# Patient Record
Sex: MALE | Race: WHITE | Employment: OTHER | URBAN - NONMETROPOLITAN AREA
[De-identification: names, ages, dates, MRNs, and addresses within clinical notes are randomized per-mention and may not be internally consistent; named-entity substitution may affect disease eponyms.]

---

## 2016-09-07 LAB
CHOLESTEROL, TOTAL: 144 MG/DL
CHOLESTEROL/HDL RATIO: 4
CREATININE, URINE: 109.6
HDLC SERPL-MCNC: 36 MG/DL (ref 35–70)
LDL CHOLESTEROL CALCULATED: 70.2 MG/DL (ref 0–160)
MICROALBUMIN/CREAT 24H UR: 1.3 MG/G{CREAT}
MICROALBUMIN/CREAT UR-RTO: 11.9
TRIGL SERPL-MCNC: 189 MG/DL
VLDLC SERPL CALC-MCNC: 38 MG/DL

## 2017-03-06 LAB
BUN BLDV-MCNC: 15 MG/DL
CALCIUM SERPL-MCNC: 9.5 MG/DL
CHLORIDE BLD-SCNC: 106 MMOL/L
CO2: 28 MMOL/L
CREAT SERPL-MCNC: 1.2 MG/DL
GFR CALCULATED: NORMAL
GLUCOSE BLD-MCNC: 231 MG/DL
POTASSIUM SERPL-SCNC: 4.5 MMOL/L
SODIUM BLD-SCNC: 143 MMOL/L

## 2017-03-09 LAB — HBA1C MFR BLD: 9.5 %

## 2017-06-05 VITALS
WEIGHT: 236 LBS | BODY MASS INDEX: 37.04 KG/M2 | HEIGHT: 67 IN | SYSTOLIC BLOOD PRESSURE: 142 MMHG | DIASTOLIC BLOOD PRESSURE: 86 MMHG

## 2017-06-05 DIAGNOSIS — F52.21 MALE ERECTILE DISORDER (CODE): ICD-10-CM

## 2017-06-05 DIAGNOSIS — D03.8: ICD-10-CM

## 2017-06-05 DIAGNOSIS — E78.5 HYPERLIPIDEMIA, UNSPECIFIED HYPERLIPIDEMIA TYPE: ICD-10-CM

## 2017-06-05 DIAGNOSIS — K21.9 GASTROESOPHAGEAL REFLUX DISEASE WITHOUT ESOPHAGITIS: ICD-10-CM

## 2017-06-05 DIAGNOSIS — G93.89 OTHER SPECIFIED DISORDERS OF BRAIN: ICD-10-CM

## 2017-06-05 DIAGNOSIS — E11.8 TYPE 2 DIABETES MELLITUS WITH COMPLICATION, WITHOUT LONG-TERM CURRENT USE OF INSULIN (HCC): ICD-10-CM

## 2017-06-05 DIAGNOSIS — R09.89 OTHER SPECIFIED SYMPTOMS AND SIGNS INVOLVING THE CIRCULATORY AND RESPIRATORY SYSTEMS: ICD-10-CM

## 2017-06-05 DIAGNOSIS — R06.2 WHEEZING: ICD-10-CM

## 2017-06-05 DIAGNOSIS — R97.20 ELEVATED PSA: ICD-10-CM

## 2017-06-05 PROBLEM — E11.9 DIABETES MELLITUS (HCC): Status: ACTIVE | Noted: 2017-06-05

## 2017-06-05 RX ORDER — METOPROLOL SUCCINATE 25 MG/1
25 TABLET, EXTENDED RELEASE ORAL DAILY
Status: ON HOLD | COMMUNITY
End: 2018-10-26

## 2017-06-05 RX ORDER — FINASTERIDE 5 MG/1
5 TABLET, FILM COATED ORAL DAILY
COMMUNITY
End: 2018-12-20

## 2017-06-05 RX ORDER — METFORMIN HYDROCHLORIDE 500 MG/1
500 TABLET, FILM COATED, EXTENDED RELEASE ORAL
COMMUNITY
End: 2017-06-06 | Stop reason: SDUPTHER

## 2017-06-05 RX ORDER — ESOMEPRAZOLE MAGNESIUM 40 MG/1
40 FOR SUSPENSION ORAL DAILY
COMMUNITY
End: 2017-06-05 | Stop reason: SDUPTHER

## 2017-06-05 RX ORDER — TAMSULOSIN HYDROCHLORIDE 0.4 MG/1
0.4 CAPSULE ORAL DAILY
COMMUNITY
End: 2018-12-20

## 2017-06-05 RX ORDER — GLIMEPIRIDE 4 MG/1
4 TABLET ORAL
COMMUNITY
End: 2017-06-29 | Stop reason: SDUPTHER

## 2017-06-05 RX ORDER — SIMVASTATIN 40 MG
40 TABLET ORAL NIGHTLY
COMMUNITY
End: 2017-06-22 | Stop reason: SDUPTHER

## 2017-06-05 RX ORDER — LISINOPRIL 2.5 MG/1
2.5 TABLET ORAL DAILY
COMMUNITY
End: 2017-06-08 | Stop reason: DRUGHIGH

## 2017-06-05 RX ORDER — NAPROXEN 500 MG/1
500 TABLET ORAL 2 TIMES DAILY WITH MEALS
COMMUNITY
End: 2018-11-27 | Stop reason: SDUPTHER

## 2017-06-05 RX ORDER — ALBUTEROL SULFATE 90 UG/1
2 AEROSOL, METERED RESPIRATORY (INHALATION) EVERY 6 HOURS PRN
COMMUNITY
End: 2017-06-08 | Stop reason: SDUPTHER

## 2017-06-06 RX ORDER — ESOMEPRAZOLE MAGNESIUM 40 MG/1
40 FOR SUSPENSION ORAL DAILY
Qty: 90 PACKET | Refills: 3 | Status: SHIPPED | OUTPATIENT
Start: 2017-06-06 | End: 2017-06-08 | Stop reason: CLARIF

## 2017-06-06 RX ORDER — METFORMIN HYDROCHLORIDE 500 MG/1
1000 TABLET, FILM COATED, EXTENDED RELEASE ORAL 2 TIMES DAILY
Qty: 360 TABLET | Refills: 3 | Status: SHIPPED | OUTPATIENT
Start: 2017-06-06 | End: 2018-05-14 | Stop reason: SDUPTHER

## 2017-06-08 ENCOUNTER — OFFICE VISIT (OUTPATIENT)
Dept: FAMILY MEDICINE CLINIC | Age: 72
End: 2017-06-08
Payer: MEDICARE

## 2017-06-08 VITALS
BODY MASS INDEX: 37.04 KG/M2 | SYSTOLIC BLOOD PRESSURE: 160 MMHG | HEIGHT: 67 IN | WEIGHT: 236 LBS | DIASTOLIC BLOOD PRESSURE: 80 MMHG | HEART RATE: 60 BPM

## 2017-06-08 DIAGNOSIS — I10 ESSENTIAL HYPERTENSION: ICD-10-CM

## 2017-06-08 DIAGNOSIS — E78.2 MIXED HYPERLIPIDEMIA: ICD-10-CM

## 2017-06-08 DIAGNOSIS — F52.21 MALE ERECTILE DISORDER (CODE): ICD-10-CM

## 2017-06-08 DIAGNOSIS — R97.20 ELEVATED PSA: ICD-10-CM

## 2017-06-08 DIAGNOSIS — R06.2 WHEEZING: ICD-10-CM

## 2017-06-08 DIAGNOSIS — K21.9 GASTROESOPHAGEAL REFLUX DISEASE WITHOUT ESOPHAGITIS: ICD-10-CM

## 2017-06-08 DIAGNOSIS — L60.0 INGROWN RIGHT BIG TOENAIL: ICD-10-CM

## 2017-06-08 DIAGNOSIS — D03.8: ICD-10-CM

## 2017-06-08 DIAGNOSIS — E11.9 TYPE 2 DIABETES MELLITUS WITHOUT COMPLICATION, UNSPECIFIED LONG TERM INSULIN USE STATUS: Primary | ICD-10-CM

## 2017-06-08 LAB — HBA1C MFR BLD: 7.8 %

## 2017-06-08 PROCEDURE — 4040F PNEUMOC VAC/ADMIN/RCVD: CPT | Performed by: FAMILY MEDICINE

## 2017-06-08 PROCEDURE — 1036F TOBACCO NON-USER: CPT | Performed by: FAMILY MEDICINE

## 2017-06-08 PROCEDURE — G8417 CALC BMI ABV UP PARAM F/U: HCPCS | Performed by: FAMILY MEDICINE

## 2017-06-08 PROCEDURE — 99214 OFFICE O/P EST MOD 30 MIN: CPT | Performed by: FAMILY MEDICINE

## 2017-06-08 PROCEDURE — 3045F PR MOST RECENT HEMOGLOBIN A1C LEVEL 7.0-9.0%: CPT | Performed by: FAMILY MEDICINE

## 2017-06-08 PROCEDURE — 3017F COLORECTAL CA SCREEN DOC REV: CPT | Performed by: FAMILY MEDICINE

## 2017-06-08 PROCEDURE — 1123F ACP DISCUSS/DSCN MKR DOCD: CPT | Performed by: FAMILY MEDICINE

## 2017-06-08 PROCEDURE — G8427 DOCREV CUR MEDS BY ELIG CLIN: HCPCS | Performed by: FAMILY MEDICINE

## 2017-06-08 PROCEDURE — 83036 HEMOGLOBIN GLYCOSYLATED A1C: CPT | Performed by: FAMILY MEDICINE

## 2017-06-08 RX ORDER — ALBUTEROL SULFATE 90 UG/1
2 AEROSOL, METERED RESPIRATORY (INHALATION) EVERY 6 HOURS PRN
Qty: 3 INHALER | Refills: 3 | Status: SHIPPED | OUTPATIENT
Start: 2017-06-08 | End: 2018-06-27 | Stop reason: SDUPTHER

## 2017-06-08 RX ORDER — LISINOPRIL 20 MG/1
20 TABLET ORAL DAILY
Qty: 90 TABLET | Refills: 3 | Status: SHIPPED | OUTPATIENT
Start: 2017-06-08 | End: 2017-08-22 | Stop reason: SDUPTHER

## 2017-06-08 RX ORDER — ESOMEPRAZOLE MAGNESIUM 40 MG/1
40 CAPSULE, DELAYED RELEASE ORAL
COMMUNITY
End: 2017-10-13 | Stop reason: SDUPTHER

## 2017-06-08 ASSESSMENT — PATIENT HEALTH QUESTIONNAIRE - PHQ9
SUM OF ALL RESPONSES TO PHQ9 QUESTIONS 1 & 2: 0
SUM OF ALL RESPONSES TO PHQ QUESTIONS 1-9: 0
2. FEELING DOWN, DEPRESSED OR HOPELESS: 0
1. LITTLE INTEREST OR PLEASURE IN DOING THINGS: 0

## 2017-06-08 ASSESSMENT — ENCOUNTER SYMPTOMS
ABDOMINAL PAIN: 0
ABDOMINAL DISTENTION: 0
SORE THROAT: 0
SHORTNESS OF BREATH: 0
WHEEZING: 0
CHEST TIGHTNESS: 0

## 2017-06-12 ENCOUNTER — PROCEDURE VISIT (OUTPATIENT)
Dept: FAMILY MEDICINE CLINIC | Age: 72
End: 2017-06-12
Payer: MEDICARE

## 2017-06-12 VITALS
HEIGHT: 67 IN | WEIGHT: 234 LBS | BODY MASS INDEX: 36.73 KG/M2 | DIASTOLIC BLOOD PRESSURE: 70 MMHG | SYSTOLIC BLOOD PRESSURE: 130 MMHG | HEART RATE: 68 BPM

## 2017-06-12 DIAGNOSIS — L60.0 INGROWN RIGHT BIG TOENAIL: Primary | ICD-10-CM

## 2017-06-12 PROCEDURE — 99999 PR OFFICE/OUTPT VISIT,PROCEDURE ONLY: CPT | Performed by: FAMILY MEDICINE

## 2017-06-12 PROCEDURE — 11750 EXCISION NAIL&NAIL MATRIX: CPT | Performed by: FAMILY MEDICINE

## 2017-06-12 PROCEDURE — 1036F TOBACCO NON-USER: CPT | Performed by: FAMILY MEDICINE

## 2017-06-22 RX ORDER — SIMVASTATIN 40 MG
TABLET ORAL
Qty: 90 TABLET | Refills: 0 | Status: SHIPPED | OUTPATIENT
Start: 2017-06-22 | End: 2017-09-21 | Stop reason: SDUPTHER

## 2017-06-29 RX ORDER — GLIMEPIRIDE 4 MG/1
TABLET ORAL
Qty: 90 TABLET | Refills: 3 | Status: SHIPPED | OUTPATIENT
Start: 2017-06-29 | End: 2018-06-25 | Stop reason: SDUPTHER

## 2017-08-22 DIAGNOSIS — I10 ESSENTIAL HYPERTENSION: ICD-10-CM

## 2017-08-22 RX ORDER — LISINOPRIL 20 MG/1
20 TABLET ORAL DAILY
Qty: 90 TABLET | Refills: 3 | Status: SHIPPED | OUTPATIENT
Start: 2017-08-22 | End: 2018-03-19 | Stop reason: SDUPTHER

## 2017-09-08 ENCOUNTER — OFFICE VISIT (OUTPATIENT)
Dept: FAMILY MEDICINE CLINIC | Age: 72
End: 2017-09-08
Payer: MEDICARE

## 2017-09-08 VITALS
RESPIRATION RATE: 18 BRPM | SYSTOLIC BLOOD PRESSURE: 132 MMHG | WEIGHT: 236 LBS | OXYGEN SATURATION: 97 % | DIASTOLIC BLOOD PRESSURE: 64 MMHG | HEART RATE: 68 BPM | BODY MASS INDEX: 36.96 KG/M2

## 2017-09-08 DIAGNOSIS — F52.21 MALE ERECTILE DISORDER (CODE): ICD-10-CM

## 2017-09-08 DIAGNOSIS — R06.2 WHEEZING: ICD-10-CM

## 2017-09-08 DIAGNOSIS — E78.2 MIXED HYPERLIPIDEMIA: ICD-10-CM

## 2017-09-08 DIAGNOSIS — Z23 NEED FOR PROPHYLACTIC VACCINATION AGAINST STREPTOCOCCUS PNEUMONIAE (PNEUMOCOCCUS): ICD-10-CM

## 2017-09-08 DIAGNOSIS — E11.8 TYPE 2 DIABETES MELLITUS WITH COMPLICATION, UNSPECIFIED LONG TERM INSULIN USE STATUS: Primary | ICD-10-CM

## 2017-09-08 DIAGNOSIS — D03.8: ICD-10-CM

## 2017-09-08 DIAGNOSIS — K21.9 GASTROESOPHAGEAL REFLUX DISEASE WITHOUT ESOPHAGITIS: ICD-10-CM

## 2017-09-08 DIAGNOSIS — L98.9 SKIN LESION: ICD-10-CM

## 2017-09-08 DIAGNOSIS — R97.20 ELEVATED PSA: ICD-10-CM

## 2017-09-08 DIAGNOSIS — Z23 NEED FOR DIPHTHERIA-TETANUS-PERTUSSIS (TDAP) VACCINE, ADULT/ADOLESCENT: ICD-10-CM

## 2017-09-08 LAB — HBA1C MFR BLD: 7.4 %

## 2017-09-08 PROCEDURE — G8417 CALC BMI ABV UP PARAM F/U: HCPCS | Performed by: FAMILY MEDICINE

## 2017-09-08 PROCEDURE — 99214 OFFICE O/P EST MOD 30 MIN: CPT | Performed by: FAMILY MEDICINE

## 2017-09-08 PROCEDURE — G8427 DOCREV CUR MEDS BY ELIG CLIN: HCPCS | Performed by: FAMILY MEDICINE

## 2017-09-08 PROCEDURE — 4040F PNEUMOC VAC/ADMIN/RCVD: CPT | Performed by: FAMILY MEDICINE

## 2017-09-08 PROCEDURE — 1036F TOBACCO NON-USER: CPT | Performed by: FAMILY MEDICINE

## 2017-09-08 PROCEDURE — 1123F ACP DISCUSS/DSCN MKR DOCD: CPT | Performed by: FAMILY MEDICINE

## 2017-09-08 PROCEDURE — 83036 HEMOGLOBIN GLYCOSYLATED A1C: CPT | Performed by: FAMILY MEDICINE

## 2017-09-08 PROCEDURE — 3046F HEMOGLOBIN A1C LEVEL >9.0%: CPT | Performed by: FAMILY MEDICINE

## 2017-09-08 PROCEDURE — 3017F COLORECTAL CA SCREEN DOC REV: CPT | Performed by: FAMILY MEDICINE

## 2017-09-08 ASSESSMENT — ENCOUNTER SYMPTOMS
SHORTNESS OF BREATH: 0
ABDOMINAL PAIN: 0
SORE THROAT: 0
ABDOMINAL DISTENTION: 0
CHEST TIGHTNESS: 0
WHEEZING: 0

## 2017-09-11 PROCEDURE — G0009 ADMIN PNEUMOCOCCAL VACCINE: HCPCS | Performed by: FAMILY MEDICINE

## 2017-09-11 PROCEDURE — 90472 IMMUNIZATION ADMIN EACH ADD: CPT | Performed by: FAMILY MEDICINE

## 2017-09-11 PROCEDURE — 90670 PCV13 VACCINE IM: CPT | Performed by: FAMILY MEDICINE

## 2017-09-12 DIAGNOSIS — E11.8 TYPE 2 DIABETES MELLITUS WITH COMPLICATION, UNSPECIFIED LONG TERM INSULIN USE STATUS: Primary | ICD-10-CM

## 2017-09-19 ENCOUNTER — PROCEDURE VISIT (OUTPATIENT)
Dept: FAMILY MEDICINE CLINIC | Age: 72
End: 2017-09-19
Payer: MEDICARE

## 2017-09-19 VITALS
WEIGHT: 239 LBS | HEART RATE: 60 BPM | SYSTOLIC BLOOD PRESSURE: 160 MMHG | DIASTOLIC BLOOD PRESSURE: 80 MMHG | BODY MASS INDEX: 37.43 KG/M2

## 2017-09-19 DIAGNOSIS — L98.9 SKIN LESION: Primary | ICD-10-CM

## 2017-09-19 PROCEDURE — 11100 PR BIOPSY OF SKIN LESION: CPT | Performed by: FAMILY MEDICINE

## 2017-09-21 RX ORDER — SIMVASTATIN 40 MG
TABLET ORAL
Qty: 90 TABLET | Refills: 0 | Status: SHIPPED | OUTPATIENT
Start: 2017-09-21 | End: 2017-12-20 | Stop reason: SDUPTHER

## 2017-09-29 LAB
CREATININE, RANDOM: 193.6 MG/DL
MICROALBUMIN UR-MCNC: 3.1 MG/DL
MICROALBUMIN/CREAT UR-RTO: 16 MCG/MG CR

## 2017-10-13 DIAGNOSIS — K21.9 GASTROESOPHAGEAL REFLUX DISEASE WITHOUT ESOPHAGITIS: Primary | ICD-10-CM

## 2017-10-13 RX ORDER — ESOMEPRAZOLE MAGNESIUM 40 MG/1
40 CAPSULE, DELAYED RELEASE ORAL
Qty: 90 CAPSULE | Refills: 3 | Status: SHIPPED | OUTPATIENT
Start: 2017-10-13 | End: 2018-10-08 | Stop reason: SDUPTHER

## 2017-12-12 ENCOUNTER — OFFICE VISIT (OUTPATIENT)
Dept: FAMILY MEDICINE CLINIC | Age: 72
End: 2017-12-12
Payer: MEDICARE

## 2017-12-12 VITALS
BODY MASS INDEX: 36.02 KG/M2 | HEART RATE: 68 BPM | SYSTOLIC BLOOD PRESSURE: 136 MMHG | DIASTOLIC BLOOD PRESSURE: 70 MMHG | WEIGHT: 230 LBS

## 2017-12-12 DIAGNOSIS — E11.8 TYPE 2 DIABETES MELLITUS WITH COMPLICATION, UNSPECIFIED LONG TERM INSULIN USE STATUS: Primary | ICD-10-CM

## 2017-12-12 DIAGNOSIS — R06.2 WHEEZING: ICD-10-CM

## 2017-12-12 DIAGNOSIS — F52.21 MALE ERECTILE DISORDER (CODE): ICD-10-CM

## 2017-12-12 DIAGNOSIS — K21.9 GASTROESOPHAGEAL REFLUX DISEASE WITHOUT ESOPHAGITIS: ICD-10-CM

## 2017-12-12 DIAGNOSIS — E78.2 MIXED HYPERLIPIDEMIA: ICD-10-CM

## 2017-12-12 LAB — HBA1C MFR BLD: 6.7 %

## 2017-12-12 PROCEDURE — G8484 FLU IMMUNIZE NO ADMIN: HCPCS | Performed by: FAMILY MEDICINE

## 2017-12-12 PROCEDURE — 1036F TOBACCO NON-USER: CPT | Performed by: FAMILY MEDICINE

## 2017-12-12 PROCEDURE — G8417 CALC BMI ABV UP PARAM F/U: HCPCS | Performed by: FAMILY MEDICINE

## 2017-12-12 PROCEDURE — G8427 DOCREV CUR MEDS BY ELIG CLIN: HCPCS | Performed by: FAMILY MEDICINE

## 2017-12-12 PROCEDURE — 1123F ACP DISCUSS/DSCN MKR DOCD: CPT | Performed by: FAMILY MEDICINE

## 2017-12-12 PROCEDURE — 3044F HG A1C LEVEL LT 7.0%: CPT | Performed by: FAMILY MEDICINE

## 2017-12-12 PROCEDURE — 4040F PNEUMOC VAC/ADMIN/RCVD: CPT | Performed by: FAMILY MEDICINE

## 2017-12-12 PROCEDURE — 3017F COLORECTAL CA SCREEN DOC REV: CPT | Performed by: FAMILY MEDICINE

## 2017-12-12 PROCEDURE — 99214 OFFICE O/P EST MOD 30 MIN: CPT | Performed by: FAMILY MEDICINE

## 2017-12-12 PROCEDURE — 83036 HEMOGLOBIN GLYCOSYLATED A1C: CPT | Performed by: FAMILY MEDICINE

## 2017-12-12 ASSESSMENT — ENCOUNTER SYMPTOMS
ABDOMINAL PAIN: 0
SHORTNESS OF BREATH: 0
SORE THROAT: 0
WHEEZING: 0
CHEST TIGHTNESS: 0
ABDOMINAL DISTENTION: 0

## 2017-12-12 NOTE — PROGRESS NOTES
[address] [address] [address]  Dept: [phone]  Dept Fax: [phone]    Ulices Dan is a 74 y.o. male who presents today for his medical conditions/complaints as noted below. Ulices Dan is c/o of 3 Month Follow-Up (pt states he is getting over a cough that he had for some time); Diabetes (bs ck every day to every other day, has not had a reading over 130. last eye exam a month ago. denies any polyuria or polydypsia, numbness or tingling);  Gastroesophageal Reflux (denies abd pain, nausea or vomiting, heartburn, rectal bleeding); and Hyperlipidemia (denies chest pains, sob, dizziness, leg edema, palpatations)      HPI:     HPI   Diabetes  Doing well      Checking blood sugars   []   none Daily; says they are really good   Meds   []   none    Last hemoglobin A1C   []   none 7.4   Last eye exam   []   none He sees opthamology every 3 months    microalbumin   []   none    Last diabetic foot exam   []   none 3/2017; normal    Endocrinologist    [x]   none    Hypoglycemic episodes    []   none Rarely ; he can tell ; symptomatic ; but not very often   Complications   []   none     CAD    []   none     Hypertension  Checking blood pressures routinely; most every day  well controlled; BP: 136/70   No problems with medication side effects; tolerating well  No chest pain  No edema     GERD  Patient is doing well  Compliant with medication  Denies heartburn  Denies dysphagia  Denies melena or hematochezia   Last EGD: none   Gastroenterologist : none   Hyperlipidemia   Cholesterol 144; HDL 36; LDL 70 in 2016  Medication side effects - none  Cardiovascular risk factors -   The 10-year CVD risk score (VIKTORIA'Agostino, et al., 2008) is: 41.1%    Values used to calculate the score:      Age: 74 years      Sex: Male      Diabetic: Yes      Tobacco smoker: No      Systolic Blood Pressure: 388 mmHg      Is BP treated: No      HDL Cholesterol: 36 mg/dL      Total thyromegaly present. Cardiovascular: Regular rhythm, S1 normal and S2 normal.    No murmur heard. Pulmonary/Chest: Breath sounds normal. He has no wheezes. He has no rhonchi. He has no rales. Abdominal: Soft. Bowel sounds are normal. There is no hepatosplenomegaly. There is no tenderness. Musculoskeletal: He exhibits no edema. Back:    Lymphadenopathy:     He has no cervical adenopathy. He has no axillary adenopathy. Skin: No rash noted. Vitals reviewed. Assessment:     1. Type 2 diabetes mellitus with complication, unspecified long term insulin use status (HCC)  POCT glycosylated hemoglobin (Hb A1C)    CBC Auto Differential    Comprehensive Metabolic Panel, Fasting   2. Gastroesophageal reflux disease without esophagitis     3. Mixed hyperlipidemia  Lipid Panel   4. Male erectile disorder (CODE)     5. Wheezing              POC Testing Results (If Applicable):  Results for POC orders placed in visit on 12/12/17   POCT glycosylated hemoglobin (Hb A1C)   Result Value Ref Range    Hemoglobin A1C 6.7 %       Plan:     Continue current meds   Follow up in 3 months     Orders Given:  Orders Placed This Encounter   Procedures    Lipid Panel     Standing Status:   Future     Standing Expiration Date:   12/12/2018     Order Specific Question:   Is Patient Fasting?/# of Hours     Answer:   Yes, 12 hours    CBC Auto Differential     Standing Status:   Future     Standing Expiration Date:   12/12/2018    Comprehensive Metabolic Panel, Fasting     Standing Status:   Future     Standing Expiration Date:   12/12/2018    POCT glycosylated hemoglobin (Hb A1C)     Prescriptions:    No orders of the defined types were placed in this encounter. Return in about 3 months (around 3/12/2018). Electronically signed by Gayle Vallejo MD on 12/13/2017.

## 2017-12-20 DIAGNOSIS — E78.5 HYPERLIPIDEMIA, UNSPECIFIED HYPERLIPIDEMIA TYPE: Primary | ICD-10-CM

## 2017-12-20 DIAGNOSIS — E11.8 TYPE 2 DIABETES MELLITUS WITH COMPLICATION, UNSPECIFIED LONG TERM INSULIN USE STATUS: ICD-10-CM

## 2017-12-21 RX ORDER — SIMVASTATIN 40 MG
TABLET ORAL
Qty: 90 TABLET | Refills: 0 | Status: SHIPPED | OUTPATIENT
Start: 2017-12-21 | End: 2018-03-20 | Stop reason: SDUPTHER

## 2018-03-10 LAB
A/G RATIO: 1.1 RATIO
AGE FOR GFR: 75
ALBUMIN: 3.9 G/DL
ALK PHOSPHATASE: 65 UNITS/L
ALT SERPL-CCNC: 49 UNITS/L
ANION GAP SERPL CALCULATED.3IONS-SCNC: 17 MMOL/L
AST SERPL-CCNC: 23 UNITS/L
BASOPHILS # BLD: 0.1 THOU/MM3
BILIRUB SERPL-MCNC: 0.6 MG/DL
BUN BLDV-MCNC: 19 MG/DL
CALCIUM SERPL-MCNC: 9.3 MG/DL
CHLORIDE BLD-SCNC: 108 MMOL/L
CHOLESTEROL/HDL RATIO: 4 RATIO
CHOLESTEROL: 149 MG/DL
CO2: 26 MMOL/L
CREAT SERPL-MCNC: 1.3 MG/DL
DIFFERENTIAL: AUTOMATED DIFF
EGFR BF: 48 ML/MIN/1.73 M2
EGFR BM: 65 ML/MIN/1.73 M2
EGFR WF: 40 ML/MIN/1.73 M2
EGFR WM: 54 ML/MIN/1.73 M2
EOSINOPHIL # BLD: 0.11 THOU/MM3
GLOBULIN: 3.4 G/DL
GLUCOSE: 192 MG/DL
HCT VFR BLD CALC: 47.4 %
HDL, DIRECT: 37 MG/DL
HEMOGLOBIN: 16.1 G/DL
LDL CHOLESTEROL CALCULATED: 89.2 MG/DL
LYMPHOCYTES # BLD: 2.2 THOU/MM3
MCH RBC QN AUTO: 29.3 PG
MCHC RBC AUTO-ENTMCNC: 33.9 G/DL
MCV RBC AUTO: 86.5 FL
MONOCYTES # BLD: 0.63 THOU/MM3
NEUTROPHILS: 2.28 THOU/MM3
PDW BLD-RTO: 12.3 %
PLATELET # BLD: 153 THOU/MM3
PMV BLD AUTO: 9.1 FL
POTASSIUM SERPL-SCNC: 4.8 MMOL/L
RBC # BLD: 5.48 M/UL
SODIUM BLD-SCNC: 146 MMOL/L
TOTAL PROTEIN: 7.3 G/DL
TRIGL SERPL-MCNC: 114 MG/DL
VLDLC SERPL CALC-MCNC: 23 MG/DL
WBC # BLD: 5.33 THOU/ML3

## 2018-03-12 DIAGNOSIS — E78.2 MIXED HYPERLIPIDEMIA: ICD-10-CM

## 2018-03-12 DIAGNOSIS — E11.8 TYPE 2 DIABETES MELLITUS WITH COMPLICATION, UNSPECIFIED LONG TERM INSULIN USE STATUS: ICD-10-CM

## 2018-03-13 ENCOUNTER — OFFICE VISIT (OUTPATIENT)
Dept: FAMILY MEDICINE CLINIC | Age: 73
End: 2018-03-13
Payer: MEDICARE

## 2018-03-13 VITALS
BODY MASS INDEX: 37.2 KG/M2 | DIASTOLIC BLOOD PRESSURE: 72 MMHG | SYSTOLIC BLOOD PRESSURE: 130 MMHG | WEIGHT: 237 LBS | HEIGHT: 67 IN | HEART RATE: 60 BPM

## 2018-03-13 DIAGNOSIS — E11.8 TYPE 2 DIABETES MELLITUS WITH COMPLICATION, UNSPECIFIED LONG TERM INSULIN USE STATUS: Primary | ICD-10-CM

## 2018-03-13 DIAGNOSIS — E78.2 MIXED HYPERLIPIDEMIA: ICD-10-CM

## 2018-03-13 DIAGNOSIS — E11.9 TYPE 2 DIABETES MELLITUS WITHOUT COMPLICATION, WITHOUT LONG-TERM CURRENT USE OF INSULIN (HCC): ICD-10-CM

## 2018-03-13 DIAGNOSIS — D03.8 MELANOMA IN SITU OF OTHER SITE (HCC): ICD-10-CM

## 2018-03-13 DIAGNOSIS — K21.9 GASTROESOPHAGEAL REFLUX DISEASE WITHOUT ESOPHAGITIS: ICD-10-CM

## 2018-03-13 LAB — HBA1C MFR BLD: 8 %

## 2018-03-13 PROCEDURE — 3045F PR MOST RECENT HEMOGLOBIN A1C LEVEL 7.0-9.0%: CPT | Performed by: FAMILY MEDICINE

## 2018-03-13 PROCEDURE — G8417 CALC BMI ABV UP PARAM F/U: HCPCS | Performed by: FAMILY MEDICINE

## 2018-03-13 PROCEDURE — 3017F COLORECTAL CA SCREEN DOC REV: CPT | Performed by: FAMILY MEDICINE

## 2018-03-13 PROCEDURE — G8427 DOCREV CUR MEDS BY ELIG CLIN: HCPCS | Performed by: FAMILY MEDICINE

## 2018-03-13 PROCEDURE — G8482 FLU IMMUNIZE ORDER/ADMIN: HCPCS | Performed by: FAMILY MEDICINE

## 2018-03-13 PROCEDURE — 1036F TOBACCO NON-USER: CPT | Performed by: FAMILY MEDICINE

## 2018-03-13 PROCEDURE — 99214 OFFICE O/P EST MOD 30 MIN: CPT | Performed by: FAMILY MEDICINE

## 2018-03-13 PROCEDURE — 83036 HEMOGLOBIN GLYCOSYLATED A1C: CPT | Performed by: FAMILY MEDICINE

## 2018-03-13 PROCEDURE — 4040F PNEUMOC VAC/ADMIN/RCVD: CPT | Performed by: FAMILY MEDICINE

## 2018-03-13 PROCEDURE — 1123F ACP DISCUSS/DSCN MKR DOCD: CPT | Performed by: FAMILY MEDICINE

## 2018-03-13 ASSESSMENT — ENCOUNTER SYMPTOMS
SHORTNESS OF BREATH: 0
SORE THROAT: 0
ABDOMINAL PAIN: 0
CHEST TIGHTNESS: 0
WHEEZING: 0
ABDOMINAL DISTENTION: 0

## 2018-03-15 PROBLEM — J45.30 MILD PERSISTENT ASTHMA WITHOUT COMPLICATION: Status: ACTIVE | Noted: 2017-06-05

## 2018-03-19 DIAGNOSIS — I10 ESSENTIAL HYPERTENSION: ICD-10-CM

## 2018-03-19 RX ORDER — LISINOPRIL 20 MG/1
20 TABLET ORAL DAILY
Qty: 90 TABLET | Refills: 3 | Status: SHIPPED | OUTPATIENT
Start: 2018-03-19 | End: 2019-03-19 | Stop reason: SDUPTHER

## 2018-03-20 DIAGNOSIS — E78.5 HYPERLIPIDEMIA, UNSPECIFIED HYPERLIPIDEMIA TYPE: ICD-10-CM

## 2018-03-20 RX ORDER — SIMVASTATIN 40 MG
TABLET ORAL
Qty: 90 TABLET | Refills: 3 | Status: SHIPPED | OUTPATIENT
Start: 2018-03-20 | End: 2019-03-19 | Stop reason: SDUPTHER

## 2018-03-20 NOTE — TELEPHONE ENCOUNTER
Anel Odom is calling to request a refill on the following medication(s):  Requested Prescriptions     Pending Prescriptions Disp Refills    simvastatin (ZOCOR) 40 MG tablet [Pharmacy Med Name: SIMVASTATIN TABS 40MG] 90 tablet 3     Sig: TAKE 1 TABLET DAILY AT BEDTIME       Last Visit Date (If Applicable):  7/80/0394    Next Visit Date:    6/14/2018

## 2018-03-22 DIAGNOSIS — E11.8 TYPE 2 DIABETES MELLITUS WITH COMPLICATION, UNSPECIFIED LONG TERM INSULIN USE STATUS: ICD-10-CM

## 2018-03-22 NOTE — TELEPHONE ENCOUNTER
From: Josephine Macias  Sent: 3/21/2018 6:48 PM EDT  Subject: Medication Renewal Request    Josephine Macias would like a refill of the following medications:      SITagliptin (JANUVIA) 100 MG tablet Tameka Chavez MD]    Preferred pharmacy: [address] [place] [address] , [address] [facility] [phone] - F [phone]

## 2018-03-28 DIAGNOSIS — E11.8 TYPE 2 DIABETES MELLITUS WITH COMPLICATION, UNSPECIFIED LONG TERM INSULIN USE STATUS: ICD-10-CM

## 2018-03-28 NOTE — TELEPHONE ENCOUNTER
From: Meli Kasper  Sent: 3/27/2018 7:04 PM EDT  Subject: Medication Renewal Request    Meli Kasper would like a refill of the following medications:      SITagliptin (JANUVIA) 100 MG tablet Soraya Willis MD]    Preferred pharmacy: [address] [place] [address] , [address] [facility] [phone] - F [phone]

## 2018-04-10 ENCOUNTER — OFFICE VISIT (OUTPATIENT)
Dept: FAMILY MEDICINE CLINIC | Age: 73
End: 2018-04-10
Payer: MEDICARE

## 2018-04-10 VITALS
SYSTOLIC BLOOD PRESSURE: 120 MMHG | WEIGHT: 222 LBS | DIASTOLIC BLOOD PRESSURE: 86 MMHG | HEART RATE: 64 BPM | BODY MASS INDEX: 34.77 KG/M2

## 2018-04-10 DIAGNOSIS — R31.9 HEMATURIA, UNSPECIFIED TYPE: Primary | ICD-10-CM

## 2018-04-10 DIAGNOSIS — R31.0 GROSS HEMATURIA: ICD-10-CM

## 2018-04-10 DIAGNOSIS — J45.901 ASTHMATIC BRONCHITIS WITH ACUTE EXACERBATION, UNSPECIFIED ASTHMA SEVERITY, UNSPECIFIED WHETHER PERSISTENT: ICD-10-CM

## 2018-04-10 LAB
AGE FOR GFR: 75
BILIRUBIN, POC: NORMAL
BLOOD URINE, POC: NORMAL
CLARITY, POC: CLEAR
COLOR, POC: YELLOW
CREAT SERPL-MCNC: 1.5 MG/DL
EGFR BF: 41 ML/MIN/1.73 M2
EGFR BM: 55 ML/MIN/1.73 M2
EGFR WF: 34 ML/MIN/1.73 M2
EGFR WM: 46 ML/MIN/1.73 M2
GLUCOSE URINE, POC: 1000
KETONES, POC: NORMAL
LEUKOCYTE EST, POC: NORMAL
NITRITE, POC: NORMAL
PH, POC: 5
PROTEIN, POC: NORMAL
SPECIFIC GRAVITY, POC: 1.01
URINE CULTURE, ROUTINE: NORMAL
UROBILINOGEN, POC: NORMAL

## 2018-04-10 PROCEDURE — 1123F ACP DISCUSS/DSCN MKR DOCD: CPT | Performed by: FAMILY MEDICINE

## 2018-04-10 PROCEDURE — G8427 DOCREV CUR MEDS BY ELIG CLIN: HCPCS | Performed by: FAMILY MEDICINE

## 2018-04-10 PROCEDURE — 81002 URINALYSIS NONAUTO W/O SCOPE: CPT | Performed by: FAMILY MEDICINE

## 2018-04-10 PROCEDURE — 1036F TOBACCO NON-USER: CPT | Performed by: FAMILY MEDICINE

## 2018-04-10 PROCEDURE — G8417 CALC BMI ABV UP PARAM F/U: HCPCS | Performed by: FAMILY MEDICINE

## 2018-04-10 PROCEDURE — 99213 OFFICE O/P EST LOW 20 MIN: CPT | Performed by: FAMILY MEDICINE

## 2018-04-10 PROCEDURE — 3017F COLORECTAL CA SCREEN DOC REV: CPT | Performed by: FAMILY MEDICINE

## 2018-04-10 PROCEDURE — 4040F PNEUMOC VAC/ADMIN/RCVD: CPT | Performed by: FAMILY MEDICINE

## 2018-04-10 RX ORDER — PREDNISONE 20 MG/1
TABLET ORAL
COMMUNITY
Start: 2018-04-05 | End: 2018-10-26

## 2018-04-10 RX ORDER — LEVOFLOXACIN 500 MG/1
TABLET, FILM COATED ORAL
COMMUNITY
Start: 2018-04-05 | End: 2018-06-14 | Stop reason: ALTCHOICE

## 2018-04-11 DIAGNOSIS — R31.9 HEMATURIA, UNSPECIFIED TYPE: ICD-10-CM

## 2018-04-16 ASSESSMENT — ENCOUNTER SYMPTOMS
ABDOMINAL PAIN: 0
COUGH: 0
WHEEZING: 1
NAUSEA: 0
SHORTNESS OF BREATH: 1
VOMITING: 0

## 2018-05-14 RX ORDER — METFORMIN HYDROCHLORIDE 500 MG/1
TABLET, FILM COATED, EXTENDED RELEASE ORAL
Qty: 360 TABLET | Refills: 3 | Status: SHIPPED | OUTPATIENT
Start: 2018-05-14 | End: 2019-11-26 | Stop reason: SDUPTHER

## 2018-06-14 ENCOUNTER — OFFICE VISIT (OUTPATIENT)
Dept: FAMILY MEDICINE CLINIC | Age: 73
End: 2018-06-14
Payer: MEDICARE

## 2018-06-14 VITALS
DIASTOLIC BLOOD PRESSURE: 70 MMHG | WEIGHT: 231 LBS | HEART RATE: 76 BPM | SYSTOLIC BLOOD PRESSURE: 120 MMHG | BODY MASS INDEX: 36.18 KG/M2

## 2018-06-14 DIAGNOSIS — M54.5 ACUTE LEFT-SIDED LOW BACK PAIN, WITH SCIATICA PRESENCE UNSPECIFIED: ICD-10-CM

## 2018-06-14 DIAGNOSIS — K21.9 GASTROESOPHAGEAL REFLUX DISEASE WITHOUT ESOPHAGITIS: ICD-10-CM

## 2018-06-14 DIAGNOSIS — E11.9 TYPE 2 DIABETES MELLITUS WITHOUT COMPLICATION, WITHOUT LONG-TERM CURRENT USE OF INSULIN (HCC): ICD-10-CM

## 2018-06-14 DIAGNOSIS — R31.0 GROSS HEMATURIA: ICD-10-CM

## 2018-06-14 DIAGNOSIS — E78.2 MIXED HYPERLIPIDEMIA: ICD-10-CM

## 2018-06-14 DIAGNOSIS — L72.3 SEBACEOUS CYST: ICD-10-CM

## 2018-06-14 DIAGNOSIS — K62.5 RECTAL BLEEDING: ICD-10-CM

## 2018-06-14 DIAGNOSIS — J45.30 MILD PERSISTENT ASTHMA WITHOUT COMPLICATION: Primary | ICD-10-CM

## 2018-06-14 LAB — HBA1C MFR BLD: 7.8 %

## 2018-06-14 PROCEDURE — 3017F COLORECTAL CA SCREEN DOC REV: CPT | Performed by: FAMILY MEDICINE

## 2018-06-14 PROCEDURE — 99215 OFFICE O/P EST HI 40 MIN: CPT | Performed by: FAMILY MEDICINE

## 2018-06-14 PROCEDURE — 1123F ACP DISCUSS/DSCN MKR DOCD: CPT | Performed by: FAMILY MEDICINE

## 2018-06-14 PROCEDURE — 2022F DILAT RTA XM EVC RTNOPTHY: CPT | Performed by: FAMILY MEDICINE

## 2018-06-14 PROCEDURE — 4040F PNEUMOC VAC/ADMIN/RCVD: CPT | Performed by: FAMILY MEDICINE

## 2018-06-14 PROCEDURE — G8427 DOCREV CUR MEDS BY ELIG CLIN: HCPCS | Performed by: FAMILY MEDICINE

## 2018-06-14 PROCEDURE — 1036F TOBACCO NON-USER: CPT | Performed by: FAMILY MEDICINE

## 2018-06-14 PROCEDURE — 83036 HEMOGLOBIN GLYCOSYLATED A1C: CPT | Performed by: FAMILY MEDICINE

## 2018-06-14 PROCEDURE — G8417 CALC BMI ABV UP PARAM F/U: HCPCS | Performed by: FAMILY MEDICINE

## 2018-06-14 PROCEDURE — 3045F PR MOST RECENT HEMOGLOBIN A1C LEVEL 7.0-9.0%: CPT | Performed by: FAMILY MEDICINE

## 2018-06-14 ASSESSMENT — ENCOUNTER SYMPTOMS
RECTAL PAIN: 0
WHEEZING: 0
ANAL BLEEDING: 1
DIARRHEA: 0
BACK PAIN: 1
ABDOMINAL PAIN: 0
NAUSEA: 0
SORE THROAT: 0
BLOOD IN STOOL: 1
VOMITING: 0
CHEST TIGHTNESS: 0
SHORTNESS OF BREATH: 0
ABDOMINAL DISTENTION: 0

## 2018-06-14 ASSESSMENT — PATIENT HEALTH QUESTIONNAIRE - PHQ9
SUM OF ALL RESPONSES TO PHQ9 QUESTIONS 1 & 2: 0
1. LITTLE INTEREST OR PLEASURE IN DOING THINGS: 0
2. FEELING DOWN, DEPRESSED OR HOPELESS: 0
SUM OF ALL RESPONSES TO PHQ QUESTIONS 1-9: 0

## 2018-06-25 RX ORDER — GLIMEPIRIDE 4 MG/1
TABLET ORAL
Qty: 90 TABLET | Refills: 3 | Status: SHIPPED | OUTPATIENT
Start: 2018-06-25 | End: 2020-11-11

## 2018-06-27 DIAGNOSIS — R06.2 WHEEZING: ICD-10-CM

## 2018-06-29 RX ORDER — MOMETASONE FUROATE AND FORMOTEROL FUMARATE DIHYDRATE 200; 5 UG/1; UG/1
AEROSOL RESPIRATORY (INHALATION)
Qty: 39 G | Refills: 3 | Status: SHIPPED | OUTPATIENT
Start: 2018-06-29

## 2018-09-20 ENCOUNTER — OFFICE VISIT (OUTPATIENT)
Dept: FAMILY MEDICINE CLINIC | Age: 73
End: 2018-09-20
Payer: MEDICARE

## 2018-09-20 VITALS
OXYGEN SATURATION: 97 % | HEART RATE: 66 BPM | RESPIRATION RATE: 12 BRPM | DIASTOLIC BLOOD PRESSURE: 72 MMHG | SYSTOLIC BLOOD PRESSURE: 134 MMHG | BODY MASS INDEX: 36.65 KG/M2 | WEIGHT: 234 LBS | TEMPERATURE: 97.9 F

## 2018-09-20 DIAGNOSIS — E78.2 MIXED HYPERLIPIDEMIA: ICD-10-CM

## 2018-09-20 DIAGNOSIS — K21.9 GASTROESOPHAGEAL REFLUX DISEASE WITHOUT ESOPHAGITIS: ICD-10-CM

## 2018-09-20 DIAGNOSIS — K62.5 RECTAL BLEEDING: ICD-10-CM

## 2018-09-20 DIAGNOSIS — R59.1 LYMPHADENOPATHY OF HEAD AND NECK: ICD-10-CM

## 2018-09-20 DIAGNOSIS — J45.30 MILD PERSISTENT ASTHMA WITHOUT COMPLICATION: Primary | ICD-10-CM

## 2018-09-20 DIAGNOSIS — E11.9 TYPE 2 DIABETES MELLITUS WITHOUT COMPLICATION, WITHOUT LONG-TERM CURRENT USE OF INSULIN (HCC): ICD-10-CM

## 2018-09-20 DIAGNOSIS — Z12.5 SCREENING PSA (PROSTATE SPECIFIC ANTIGEN): ICD-10-CM

## 2018-09-20 DIAGNOSIS — R31.0 GROSS HEMATURIA: ICD-10-CM

## 2018-09-20 LAB — HBA1C MFR BLD: 7.5 %

## 2018-09-20 PROCEDURE — 99214 OFFICE O/P EST MOD 30 MIN: CPT | Performed by: FAMILY MEDICINE

## 2018-09-20 PROCEDURE — 83036 HEMOGLOBIN GLYCOSYLATED A1C: CPT | Performed by: FAMILY MEDICINE

## 2018-09-20 PROCEDURE — G8417 CALC BMI ABV UP PARAM F/U: HCPCS | Performed by: FAMILY MEDICINE

## 2018-09-20 PROCEDURE — 1101F PT FALLS ASSESS-DOCD LE1/YR: CPT | Performed by: FAMILY MEDICINE

## 2018-09-20 PROCEDURE — G8427 DOCREV CUR MEDS BY ELIG CLIN: HCPCS | Performed by: FAMILY MEDICINE

## 2018-09-20 PROCEDURE — 1123F ACP DISCUSS/DSCN MKR DOCD: CPT | Performed by: FAMILY MEDICINE

## 2018-09-20 PROCEDURE — 1036F TOBACCO NON-USER: CPT | Performed by: FAMILY MEDICINE

## 2018-09-20 PROCEDURE — 3017F COLORECTAL CA SCREEN DOC REV: CPT | Performed by: FAMILY MEDICINE

## 2018-09-20 PROCEDURE — 4040F PNEUMOC VAC/ADMIN/RCVD: CPT | Performed by: FAMILY MEDICINE

## 2018-09-20 PROCEDURE — 2022F DILAT RTA XM EVC RTNOPTHY: CPT | Performed by: FAMILY MEDICINE

## 2018-09-20 PROCEDURE — 3045F PR MOST RECENT HEMOGLOBIN A1C LEVEL 7.0-9.0%: CPT | Performed by: FAMILY MEDICINE

## 2018-09-21 NOTE — PROGRESS NOTES
He has no axillary adenopathy. Skin: No rash noted. Vitals reviewed. Assessment:      Diagnosis Orders   1. Mild persistent asthma without complication     2. Gastroesophageal reflux disease without esophagitis     3. Type 2 diabetes mellitus without complication, without long-term current use of insulin (HCC)  POCT glycosylated hemoglobin (Hb A1C)    Microalbumin, Ur    dapagliflozin (FARXIGA) 10 MG tablet   4. Mixed hyperlipidemia     5. Gross hematuria  Ambulatory referral to Urology   6. Rectal bleeding  Ambulatory referral to General Surgery   7. Screening PSA (prostate specific antigen)  PSA Screening   8. Lymphadenopathy of head and neck          POC Testing Results (If Applicable):  Results for POC orders placed in visit on 09/20/18   POCT glycosylated hemoglobin (Hb A1C)   Result Value Ref Range    Hemoglobin A1C 7.5 %       Plan: At this time, we will refill his Michelle Rolling. We will increase the dose to 10 mg.  I do once again want him to see Urology and repeat colonoscopy. We will need to locate his records, his past chart, in regards to his lymphadenopathy and compare. Continue current medications. Recheck in 3 months. Addendum  Records were located from ENT. Dating back to 2006 with Dr. Sheela Siegel. Thought to be benign but no imaging was found.   Recommend we proceed with referral to ENT again  Orders Given:  Orders Placed This Encounter   Procedures    Microalbumin, Ur     Standing Status:   Future     Standing Expiration Date:   9/20/2019    PSA Screening     Standing Status:   Future     Standing Expiration Date:   9/20/2019    CBC Auto Differential    Microalbumin, Ur    Lipid Panel    Comprehensive Metabolic Panel    Psa screening    Ambulatory referral to General Surgery     Referral Priority:   Routine     Referral Type:   Surgical     Number of Visits Requested:   1    Ambulatory referral to Urology     Referral Priority:   Routine     Referral Type:   Consult for

## 2018-09-22 LAB
A/G RATIO: 1.2 RATIO
AGE FOR GFR: 75
ALBUMIN: 3.6 G/DL
ALK PHOSPHATASE: 59 UNITS/L
ALT SERPL-CCNC: 41 UNITS/L
ANION GAP SERPL CALCULATED.3IONS-SCNC: 11 MMOL/L
AST SERPL-CCNC: 19 UNITS/L
BASOPHILS # BLD: 0.09 THOU/MM3
BILIRUB SERPL-MCNC: 0.9 MG/DL
BUN BLDV-MCNC: 14 MG/DL
CALCIUM SERPL-MCNC: 8.7 MG/DL
CHLORIDE BLD-SCNC: 108 MMOL/L
CHOLESTEROL/HDL RATIO: 3.3 RATIO
CHOLESTEROL: 131 MG/DL
CO2: 26 MMOL/L
CREAT SERPL-MCNC: 1.1 MG/DL
CREATININE, RANDOM: 86.1 MG/DL
DIFFERENTIAL: AUTOMATED DIFF
EGFR BF: 59 ML/MIN/1.73 M2
EGFR BM: 79 ML/MIN/1.73 M2
EGFR WF: 48 ML/MIN/1.73 M2
EGFR WM: 65 ML/MIN/1.73 M2
EOSINOPHIL # BLD: 0.1 THOU/MM3
GLOBULIN: 2.9 G/DL
GLUCOSE: 123 MG/DL
HCT VFR BLD CALC: 47.2 %
HDL, DIRECT: 40 MG/DL
HEMOGLOBIN: 15.3 G/DL
LDL CHOLESTEROL CALCULATED: 69 MG/DL
LYMPHOCYTES # BLD: 1.7 THOU/MM3
MCH RBC QN AUTO: 28.3 PG
MCHC RBC AUTO-ENTMCNC: 32.5 G/DL
MCV RBC AUTO: 87.2 FL
MICROALBUMIN UR-MCNC: 3 MG/DL
MICROALBUMIN/CREAT UR-RTO: 34.8 MCG/MG CR
MONOCYTES # BLD: 0.66 THOU/MM3
NEUTROPHILS: 3.11 THOU/MM3
PDW BLD-RTO: 12.1 %
PLATELET # BLD: 140 THOU/MM3
PMV BLD AUTO: 7.3 FL
POTASSIUM SERPL-SCNC: 4.1 MMOL/L
RBC # BLD: 5.41 M/UL
SCREENING PSA: 8.2 NG/ML
SODIUM BLD-SCNC: 141 MMOL/L
TOTAL PROTEIN: 6.5 G/DL
TRIGL SERPL-MCNC: 110 MG/DL
VLDLC SERPL CALC-MCNC: 22 MG/DL
WBC # BLD: 5.66 THOU/ML3

## 2018-09-24 ASSESSMENT — ENCOUNTER SYMPTOMS
ABDOMINAL PAIN: 0
NAUSEA: 0
ANAL BLEEDING: 0
WHEEZING: 0
SHORTNESS OF BREATH: 0
RECTAL PAIN: 0
VOMITING: 0
DIARRHEA: 0
SORE THROAT: 0
BLOOD IN STOOL: 0
CHEST TIGHTNESS: 0
BACK PAIN: 0
ABDOMINAL DISTENTION: 0

## 2018-09-25 DIAGNOSIS — R59.0 SUBMANDIBULAR LYMPHADENOPATHY: Primary | ICD-10-CM

## 2018-09-26 ENCOUNTER — OFFICE VISIT (OUTPATIENT)
Dept: OTOLARYNGOLOGY | Age: 73
End: 2018-09-26
Payer: MEDICARE

## 2018-09-26 VITALS
HEIGHT: 67 IN | RESPIRATION RATE: 14 BRPM | HEART RATE: 74 BPM | SYSTOLIC BLOOD PRESSURE: 128 MMHG | DIASTOLIC BLOOD PRESSURE: 82 MMHG | WEIGHT: 234 LBS | BODY MASS INDEX: 36.73 KG/M2

## 2018-09-26 DIAGNOSIS — R59.0 LYMPHADENOPATHY OF HEAD AND NECK REGION: Primary | ICD-10-CM

## 2018-09-26 PROCEDURE — 4040F PNEUMOC VAC/ADMIN/RCVD: CPT | Performed by: NURSE PRACTITIONER

## 2018-09-26 PROCEDURE — 1101F PT FALLS ASSESS-DOCD LE1/YR: CPT | Performed by: NURSE PRACTITIONER

## 2018-09-26 PROCEDURE — 1123F ACP DISCUSS/DSCN MKR DOCD: CPT | Performed by: NURSE PRACTITIONER

## 2018-09-26 PROCEDURE — 1036F TOBACCO NON-USER: CPT | Performed by: NURSE PRACTITIONER

## 2018-09-26 PROCEDURE — G8417 CALC BMI ABV UP PARAM F/U: HCPCS | Performed by: NURSE PRACTITIONER

## 2018-09-26 PROCEDURE — G8427 DOCREV CUR MEDS BY ELIG CLIN: HCPCS | Performed by: NURSE PRACTITIONER

## 2018-09-26 PROCEDURE — 3017F COLORECTAL CA SCREEN DOC REV: CPT | Performed by: NURSE PRACTITIONER

## 2018-09-26 PROCEDURE — 99203 OFFICE O/P NEW LOW 30 MIN: CPT | Performed by: NURSE PRACTITIONER

## 2018-09-26 RX ORDER — AMOXICILLIN AND CLAVULANATE POTASSIUM 875; 125 MG/1; MG/1
1 TABLET, FILM COATED ORAL 2 TIMES DAILY
Qty: 20 TABLET | Refills: 0 | Status: SHIPPED | OUTPATIENT
Start: 2018-09-26 | End: 2018-10-06

## 2018-09-26 ASSESSMENT — ENCOUNTER SYMPTOMS
HEARTBURN: 1
RESPIRATORY NEGATIVE: 1

## 2018-09-26 NOTE — PROGRESS NOTES
2.9 09/22/2018       Outpatient Encounter Prescriptions as of 9/26/2018   Medication Sig Dispense Refill    amoxicillin-clavulanate (AUGMENTIN) 875-125 MG per tablet Take 1 tablet by mouth 2 times daily for 10 days 20 tablet 0    dapagliflozin (FARXIGA) 10 MG tablet Take 1 tablet by mouth every morning 90 tablet 3    PROAIR  (90 Base) MCG/ACT inhaler USE 2 INHALATIONS EVERY 6 HOURS AS NEEDED FOR WHEEZING 25.5 g 3    DULERA 200-5 MCG/ACT inhaler USE 2 INHALATIONS EVERY 12 HOURS 39 g 3    ONE TOUCH ULTRA TEST strip USE TO TEST BLOOD SUGAR TWICE A  each 3    glimepiride (AMARYL) 4 MG tablet TAKE 1 TABLET DAILY 90 tablet 3    metFORMIN, MOD, (GLUMETZA) 500 MG extended release tablet TAKE 2 TABLETS TWICE A  tablet 3    SITagliptin (JANUVIA) 100 MG tablet Take 1 tablet by mouth daily 90 tablet 3    simvastatin (ZOCOR) 40 MG tablet TAKE 1 TABLET DAILY AT BEDTIME 90 tablet 3    lisinopril (PRINIVIL;ZESTRIL) 20 MG tablet Take 1 tablet by mouth daily 90 tablet 3    esomeprazole (NEXIUM) 40 MG delayed release capsule Take 1 capsule by mouth every morning (before breakfast) 90 capsule 3    naproxen (NAPROSYN) 500 MG tablet Take 500 mg by mouth 2 times daily (with meals)      metoprolol succinate (TOPROL XL) 25 MG extended release tablet Take 25 mg by mouth daily      tamsulosin (FLOMAX) 0.4 MG capsule Take 0.4 mg by mouth daily      aspirin 81 MG tablet Take 81 mg by mouth daily      Multiple Vitamins-Minerals (MULTIVITAMIN & MINERAL PO) Take by mouth      finasteride (PROSCAR) 5 MG tablet Take 5 mg by mouth daily      predniSONE (DELTASONE) 20 MG tablet        No facility-administered encounter medications on file as of 9/26/2018. Review of Systems   Constitutional: Negative for chills, fever, malaise/fatigue and weight loss. HENT:        Right submandibular area of swelling   Respiratory: Negative. Cardiovascular: Negative. Gastrointestinal: Positive for heartburn (GERD). Skin: Negative. Neurological: Negative for weakness. Endo/Heme/Allergies: Negative. Physical Exam   Constitutional: He is oriented to person, place, and time. He appears well-developed and well-nourished. HENT:   Head: Normocephalic. Right Ear: External ear normal.   Left Ear: External ear normal.   Nose: Nose normal.   Mouth/Throat: Uvula is midline, oropharynx is clear and moist and mucous membranes are normal. He has dentures (upper). No oral lesions. Normal dentition. No dental abscesses. Neck: Normal range of motion. No thyromegaly present. Cardiovascular: Normal rate. Pulmonary/Chest: Effort normal.   Musculoskeletal: Normal range of motion. Lymphadenopathy:     He has cervical adenopathy. Neurological: He is alert and oriented to person, place, and time. Skin: Skin is warm. Psychiatric: He has a normal mood and affect. Data reviewed:      ASSESSMENT:   Diagnosis Orders   1. Lymphadenopathy of head and neck region  CT SOFT TISSUE NECK W CONTRAST       PLAN:  Return in about 3 weeks (around 10/17/2018) for recheck with Dr. Irma Andrew after CT of neck.     Orders Placed This Encounter   Medications    amoxicillin-clavulanate (AUGMENTIN) 875-125 MG per tablet     Sig: Take 1 tablet by mouth 2 times daily for 10 days     Dispense:  20 tablet     Refill:  0     Augmentin twice daily for 10 days  CT of neck after completion of antibiotics  Follow up with Dr. Irma Andrew after completion of antibiotics & CT scan    Electronically signed by EDMOND Wong CNP on 9/26/18 at 1:32 PM

## 2018-09-26 NOTE — PATIENT INSTRUCTIONS
Patient Education   Augmentin twice daily for 10 days  CT of neck after completion of antibiotics  Follow up with Dr. Mateo Clarke after completion of antibiotics & CT scan     Swollen Lymph Nodes: Care Instructions  Your Care Instructions    Lymph nodes are small, bean-shaped glands throughout the body. They help your body fight germs and infections. Lymph nodes often swell when there is a problem such as an injury, infection, or tumor. · The nodes in your neck, under your chin, or behind your ears may swell when you have a cold or sore throat. · An injury or infection in a leg or foot can make the nodes in your groin swell. · Sometimes medicine can make lymph nodes swell, but this is rare. Treatment depends on what caused your nodes to swell. Usually the nodes return to normal size without a problem. Follow-up care is a key part of your treatment and safety. Be sure to make and go to all appointments, and call your doctor if you are having problems. It's also a good idea to know your test results and keep a list of the medicines you take. How can you care for yourself at home? · Take your medicines exactly as prescribed. Call your doctor if you think you are having a problem with your medicine. · Avoid irritation. ¨ Do not squeeze or pick at the lump. ¨ Do not stick a needle in it. · Prevent infection. Do not squeeze, drain, or puncture a painful lump. Doing this can irritate or inflame the lump, push any existing infection deeper into the skin, or cause severe bleeding. · Get extra rest. Slow down just a little from your usual routine. · Drink plenty of fluids, enough so that your urine is light yellow or clear like water. If you have kidney, heart, or liver disease and have to limit fluids, talk with your doctor before you increase the amount of fluids you drink. · Take an over-the-counter pain medicine, such as acetaminophen (Tylenol), ibuprofen (Advil, Motrin), or naproxen (Aleve).  Read and follow all instructions on the label. · Do not take two or more pain medicines at the same time unless the doctor told you to. Many pain medicines have acetaminophen, which is Tylenol. Too much acetaminophen (Tylenol) can be harmful. When should you call for help? Call your doctor now or seek immediate medical care if:    · You have worse symptoms of infection, such as:  ¨ Increased pain, swelling, warmth, or redness. ¨ Red streaks leading from the area. ¨ Pus draining from the area. ¨ A fever.    Watch closely for changes in your health, and be sure to contact your doctor if:    · Your lymph nodes do not get smaller or do not return to normal.     · You do not get better as expected. Where can you learn more? Go to [email or web address]://[email or web address]. org and sign in to your [employer] account. Enter N827 in the [employer] box to learn more about \"Swollen Lymph Nodes: Care Instructions. \"     If you do not have an account, please click on the \"Sign Up Now\" link. Current as of: November 18, 2017  Content Version: 11.7  © 8357-4293 [employer]. Care instructions adapted under license by [facility] ([facility]). If you have questions about a medical condition or this instruction, always ask your healthcare professional. Ani Nguyen any warranty or liability for your use of this information.

## 2018-10-03 ENCOUNTER — OFFICE VISIT (OUTPATIENT)
Dept: UROLOGY | Age: 73
End: 2018-10-03
Payer: MEDICARE

## 2018-10-03 VITALS
SYSTOLIC BLOOD PRESSURE: 158 MMHG | DIASTOLIC BLOOD PRESSURE: 88 MMHG | BODY MASS INDEX: 35.94 KG/M2 | WEIGHT: 229 LBS | HEART RATE: 60 BPM | HEIGHT: 67 IN

## 2018-10-03 DIAGNOSIS — R31.0 GROSS HEMATURIA: Primary | ICD-10-CM

## 2018-10-03 DIAGNOSIS — R97.20 ELEVATED PSA: ICD-10-CM

## 2018-10-03 PROCEDURE — G8482 FLU IMMUNIZE ORDER/ADMIN: HCPCS | Performed by: UROLOGY

## 2018-10-03 PROCEDURE — G8417 CALC BMI ABV UP PARAM F/U: HCPCS | Performed by: UROLOGY

## 2018-10-03 PROCEDURE — 1036F TOBACCO NON-USER: CPT | Performed by: UROLOGY

## 2018-10-03 PROCEDURE — 1101F PT FALLS ASSESS-DOCD LE1/YR: CPT | Performed by: UROLOGY

## 2018-10-03 PROCEDURE — G8428 CUR MEDS NOT DOCUMENT: HCPCS | Performed by: UROLOGY

## 2018-10-03 PROCEDURE — 4040F PNEUMOC VAC/ADMIN/RCVD: CPT | Performed by: UROLOGY

## 2018-10-03 PROCEDURE — 99204 OFFICE O/P NEW MOD 45 MIN: CPT | Performed by: UROLOGY

## 2018-10-03 PROCEDURE — 1123F ACP DISCUSS/DSCN MKR DOCD: CPT | Performed by: UROLOGY

## 2018-10-03 PROCEDURE — 3017F COLORECTAL CA SCREEN DOC REV: CPT | Performed by: UROLOGY

## 2018-10-04 ENCOUNTER — OFFICE VISIT (OUTPATIENT)
Dept: SURGERY | Age: 73
End: 2018-10-04
Payer: MEDICARE

## 2018-10-04 VITALS
HEART RATE: 64 BPM | DIASTOLIC BLOOD PRESSURE: 73 MMHG | WEIGHT: 230 LBS | SYSTOLIC BLOOD PRESSURE: 164 MMHG | TEMPERATURE: 97.8 F | HEIGHT: 68 IN | BODY MASS INDEX: 34.86 KG/M2

## 2018-10-04 DIAGNOSIS — K62.5 BRBPR (BRIGHT RED BLOOD PER RECTUM): Primary | ICD-10-CM

## 2018-10-04 LAB
APPEARANCE: CLEAR
BACTERIA: ABNORMAL 1HPF
BILIRUBIN: ABNORMAL
BLOOD: ABNORMAL
CASTS: ABNORMAL /LPF
COLOR: YELLOW
CRYSTALS: ABNORMAL /HPF
EPITHELIAL CELLS, UA: ABNORMAL /HPF
GLUCOSE: ABNORMAL MG/DL
KETONES: ABNORMAL MG/DL
LEUKOCYTES, UA: ABNORMAL
MICROSCOPIC URINE: ABNORMAL
MUCUS: ABNORMAL /HPF
NITRITE, URINE: ABNORMAL
PH: 6 PH
PROTEIN,SCREEN: ABNORMAL MG/DL
RBC: ABNORMAL /HPF
SPECIFIC GRAVITY, URINE: 1.02 MG/DL
UROBILINOGEN, URINE: 0.2 MG/DL
WBC URINE: ABNORMAL
YEAST: ABNORMAL /HPF

## 2018-10-04 PROCEDURE — G8427 DOCREV CUR MEDS BY ELIG CLIN: HCPCS | Performed by: SURGERY

## 2018-10-04 PROCEDURE — G8482 FLU IMMUNIZE ORDER/ADMIN: HCPCS | Performed by: SURGERY

## 2018-10-04 PROCEDURE — 99204 OFFICE O/P NEW MOD 45 MIN: CPT | Performed by: SURGERY

## 2018-10-04 PROCEDURE — G8417 CALC BMI ABV UP PARAM F/U: HCPCS | Performed by: SURGERY

## 2018-10-04 PROCEDURE — 1036F TOBACCO NON-USER: CPT | Performed by: SURGERY

## 2018-10-04 PROCEDURE — 4040F PNEUMOC VAC/ADMIN/RCVD: CPT | Performed by: SURGERY

## 2018-10-04 PROCEDURE — 3017F COLORECTAL CA SCREEN DOC REV: CPT | Performed by: SURGERY

## 2018-10-04 PROCEDURE — 1123F ACP DISCUSS/DSCN MKR DOCD: CPT | Performed by: SURGERY

## 2018-10-04 PROCEDURE — 1101F PT FALLS ASSESS-DOCD LE1/YR: CPT | Performed by: SURGERY

## 2018-10-04 NOTE — PROGRESS NOTES
History     Chief Complaint:  Hallucination    HPI   Andrea Collado is a 51 year old male with an extensive history of psychosis and paranoid behavior who presents via EMS after experiencing hallucinations tonight. The patient reportedly called police this evening from his father's home where he is currently staying. He told them that there were two men outside trying to break in and still his father's guns. When police arrived they found no one at the house but the patient and decided to have him brought in for evaluation due to the fact that he was seeing and hearing things. When they told him this plan he began to get agitated and verbally aggressive with them. EMS arrived and gave the patient 5 mg Versed and Haldol to calm him down while en route to the ED. Upon arrival here the patient is responding with grunts to staff's questioning, but otherwise is not cooperative or forthcoming with any history.     Allergies:  No Known Drug Allergies     Medications:    Klonopin  Roxicodone  Gabapentin  Eskalith  Lunesta  Artane  Haldol  Zyprexa  Cipro  Omeprazole     Past Medical History:    Chemical dependency  Cocaine abuse  Depressive disorder  DVT  Hypertension  Seizures    Past Surgical History:    Chest surgery, flail chest, sternum fracture  Hip surgery  Knee surgery  Orthopedic surgery     Family History:    Substance abuse    Social History:  The patient was accompanied to the ED by EMS.  Smoking Status: Yes  Alcohol Use: Yes  Marital Status:  Single      Review of Systems   Unable to perform ROS: Acuity of condition     Physical Exam   Vitals:  Patient Vitals for the past 24 hrs:   BP Temp Temp src Heart Rate Resp SpO2 Height Weight   04/04/18 0645 - - - 71 14 95 % - -   04/04/18 0630 - - - 63 16 94 % - -   04/04/18 0615 - - - 74 16 95 % - -   04/04/18 0600 102/60 - - 70 14 93 % - -   04/04/18 0545 - - - 72 (!) 32 94 % - -   04/04/18 0500 111/68 - - - - 92 % - -   04/04/18 0445 - - - - - 91 % - -   04/04/18  Sophia Sierra is a 74 y.o. male who presents today for evaluation of likely rectal bleeding that occurred approximately 4 months ago. Patient states he was going to the bathroom and when he can the toilet he noticed that there was blood layered at the bottom of the toilet bowl. Interestingly patient did not have a bowel movement during this episode but instead had just urinated. He does not think that the blood originated in his urine but he also does not remember passing anything from his rectum that he could appreciate. Patient had an additional episode during a bowel movement which again seemed to layer into the bottom of the toilet. Since that time he denies any further history of rectal bleeding and has no prior history of blood per rectum. He was seen by his PCP who recommended that he see urology for concern of possible hematuria. He was seen by Dr. Anthony Owusu who is recommended that he undergo prostate biopsy. Patient denies any family history of colon cancer however does state that his brother has previously been diagnosed with prostate cancer. No other family history cancer reported. Additionally patient denies any family history of inflammatory bowel conditions. The patient states that his bowel movements have been what he considers normal and he usually has a bowel movement daily. He does report occasional constipation but relates this to what he eats. Only abdominal surgical history is cholecystectomy. Patient reports prior colonoscopies ×2 without any abnormal findings on either. Review of the records that show that on recent evaluation by urology patient's PSA level had gone up to 8.2. Due to the unclear source of the blood patient was recommended by his PCP to see general surgery for colonoscopy.     Past Medical History:   Diagnosis Date    GERD (gastroesophageal reflux disease)     Hyperlipidemia     [facility] ([facility] [address]     Rheumatoid arthritis ([facility] [address]        Past Surgical "0430 109/62 - - - - 90 % - -   04/04/18 0415 - - - - - 93 % - -   04/04/18 0400 92/61 - - - - - - -   04/04/18 0345 - - - 91 17 95 % - -   04/04/18 0330 101/65 - - 87 16 94 % - -   04/04/18 0300 99/62 - - 84 17 93 % - -   04/04/18 0249 104/62 97.7  F (36.5  C) Temporal 84 14 93 % 1.854 m (6' 1\") 102.1 kg (225 lb)     Physical Exam  General: Sedated, appears elderly, otherwise well-developed and well-nourished. Cooperative.   HEENT:  Head:  Atraumatic  Ears:  External ears are normal  Mouth/Throat:  Oropharynx is without erythema or exudate and mucous membranes are dry.   Eyes:   Conjunctivae normal and EOM are normal. No scleral icterus.    Pupils are equal, round, and reactive to light.   CV:  Normal rate, regular rhythm, normal heart sounds and radial pulses are 2+ and symmetric.  No murmur.  Resp:  Breath sounds are clear bilaterally    Non-labored, no retractions or accessory muscle use  GI:  Abdomen is soft, no distension, no tenderness. No rebound or guarding.    MS:  Normal range of motion. No edema.    Back atraumatic.  Skin:  Warm and dry.  Diffuse abrasions to extremities, appear old.  No new traumatic injuries.  Neuro: Sedated, unable to assess.  Psych:  Sedated, unable to assess.    Emergency Department Course     ECG:  ECG taken at 0258, ECG read at 0315  Normal sinus rhythm  Prolonged QT  Abnormal ECG  No significant change from 4/25/13  Rate 87 bpm. ND interval 174. QRS duration 100. QT/QTc 428/515. P-R-T axes 70 71 79.    Laboratory:  Laboratory findings were communicated with the patient who voiced understanding of the findings.  UA: Ketones: 10 (A), Blood: Moderate (A), Protein albumin: 30 (A), Mucous: Present (A), Hyaline casts: 9 (H)  Drug abuse screen: Positive for amphetamines and benzodiazepines  Acetaminophen level: <2  Salicylate level: <2  Alcohol level blood (collected at 0310): 0.20 (H)  CBC: WBC: 3.7 (H), HGB: 12.4 (L), PLT: 125 (L)  TSH with free T4 reflex: 1.30  BMP: Glucose: 63 (L), " BUN: 36 (H), Carbon dioxide:  18 (L), Calcium: 7.7 (L) o/w WNL (Creatinine 1.03)    Interventions:  0406 Zyprexa, 10 mg, IM  0512 Dextrose 50%, 10 mg, IV  0655 Repeat glucose POC - 98    Emergency Department Course:  Nursing notes and vitals reviewed.  0247 I had my initial encounter with the patient.  I performed an exam of the patient as documented above.   IV was inserted and blood was drawn for laboratory testing, results above.  The patient provided a urine sample here in the emergency department. This was sent for laboratory testing, findings above.  EKG obtained in the ED, see results above.   0403 I spoke with Central Intake regarding this patient.    0700 The patient was signed out to my colleague Dr. Hurtado.    Impression & Plan      Medical Decision Making:  Patient is a 51-year-old male with past medical history pertinent for polysubstance abuse, bipolar, and unspecified psychosis who presents with hallucinations and polysubstance abuse. Patient is intoxicated here to 0.20 based on blood work. Patient also with positive amphetamines in his urine suspicious for meth use. Patient does have track marks on physical exam concerning for recent meth use. Patient was given IM Versed and Haldol prior to arrival by EMS. He was sedated on my initial physical exam assessment. Initial overdose workup was obtained and patient found to have positive amphetamines in his urine as well as mild hypoglycemia. Patient was able to tolerate drinking orange juice after arousing. Patient complained of some muscle cramps. He does deny suicidal and homicidal ideation although the patient does not recollect the hallucinations for which she called police for earlier tonight. On my review of the patient's medical chart does appear patient has been admitted to inpatient psychiatry on multiple occasions due to unspecified psychosis, bipolar, and hallucinations. The combination of this hallucinatory events tonight as well as  polysubstance abuse concerns me for decompensation, the patient's bipolar, and psychosis diagnoses. Patient did require IM Zyprexa here in the emergency department due to increasing restlessness and agitation while sobering.  Patient was not evaluated by DEC while under my care due to inability to cooperate with exam and history taking. If patient does sober and is able to have a more productive conversation this morning, DEC could potentially perform their own history and provide additional recommendations. I did discuss with Central Intake regarding the patient's need for inpatient psychiatric admission. He is medically cleared from my perspective.  Repeat glucose checks show normalization of his glucose here. Patient is tolerating oral intake well. Patient care was signed out to Dr. Hurtado with plans for eventual inpatient psychiatric admission for hallucinations, psychosis, and polysubstance abuse.    Diagnosis:    ICD-10-CM    1. Hallucinations R44.3    2. Polysubstance abuse F19.10    3. Acute alcoholic intoxication in alcoholism with complication (H) F10.229    4. Altered mental status, unspecified altered mental status type R41.82    5. Agitation requiring sedation protocol R45.1      Disposition:   The patient was signed out to my colleague Dr. Hurtado.    Scribe Disclosure:  I, Altaf Higgins, am serving as a scribe at 2:45 AM on 4/4/2018 to document services personally performed by Osmel Kenney MD, based on my observations and the provider's statements to me.    4/4/2018    EMERGENCY DEPARTMENT       Osmel Kenney MD  04/04/18 0659

## 2018-10-08 DIAGNOSIS — K21.9 GASTROESOPHAGEAL REFLUX DISEASE WITHOUT ESOPHAGITIS: ICD-10-CM

## 2018-10-08 RX ORDER — ESOMEPRAZOLE MAGNESIUM 40 MG/1
CAPSULE, DELAYED RELEASE ORAL
Qty: 90 CAPSULE | Refills: 3 | Status: SHIPPED | OUTPATIENT
Start: 2018-10-08 | End: 2019-06-03 | Stop reason: SDUPTHER

## 2018-10-08 NOTE — TELEPHONE ENCOUNTER
Elyssa Wagoner is calling to request a refill on the following medication(s):  Requested Prescriptions     Pending Prescriptions Disp Refills    esomeprazole (PredictionIO) 40 MG delayed release capsule [Pharmacy Med Name: ESOMEPRAZOLE MAG DR CAPS 40MG] 90 capsule 3     Sig: TAKE 1 CAPSULE EVERY MORNING BEFORE BREAKFAST       Last Visit Date (If Applicable):  7/21/0070    Next Visit Date:    12/20/2018

## 2018-10-10 ENCOUNTER — HOSPITAL ENCOUNTER (OUTPATIENT)
Dept: CT IMAGING | Age: 73
Discharge: HOME | End: 2018-10-12
Payer: MEDICARE

## 2018-10-10 DIAGNOSIS — R59.0 LYMPHADENOPATHY OF HEAD AND NECK REGION: ICD-10-CM

## 2018-10-10 PROCEDURE — 2709999900 CT SOFT TISSUE NECK W CONTRAST

## 2018-10-10 PROCEDURE — 6360000004 HC RX CONTRAST MEDICATION: Performed by: NURSE PRACTITIONER

## 2018-10-10 RX ADMIN — IOPAMIDOL 75 ML: 755 INJECTION, SOLUTION INTRAVENOUS at 10:06

## 2018-10-17 ENCOUNTER — TELEPHONE (OUTPATIENT)
Dept: UROLOGY | Age: 73
End: 2018-10-17

## 2018-10-17 DIAGNOSIS — R97.20 ELEVATED PSA: Primary | ICD-10-CM

## 2018-10-26 ENCOUNTER — OFFICE VISIT (OUTPATIENT)
Dept: OTOLARYNGOLOGY | Age: 73
End: 2018-10-26
Payer: MEDICARE

## 2018-10-26 ENCOUNTER — HOSPITAL ENCOUNTER (INPATIENT)
Age: 73
LOS: 1 days | Discharge: HOME | DRG: 864 | End: 2018-10-27
Attending: EMERGENCY MEDICINE | Admitting: INTERNAL MEDICINE
Payer: MEDICARE

## 2018-10-26 ENCOUNTER — APPOINTMENT (OUTPATIENT)
Dept: GENERAL RADIOLOGY | Age: 73
DRG: 864 | End: 2018-10-26
Payer: MEDICARE

## 2018-10-26 VITALS — HEART RATE: 88 BPM | DIASTOLIC BLOOD PRESSURE: 88 MMHG | RESPIRATION RATE: 18 BRPM | SYSTOLIC BLOOD PRESSURE: 138 MMHG

## 2018-10-26 DIAGNOSIS — R59.1 LYMPHADENOPATHY OF HEAD AND NECK: Primary | ICD-10-CM

## 2018-10-26 DIAGNOSIS — R50.9 FEVER, UNSPECIFIED FEVER CAUSE: Primary | ICD-10-CM

## 2018-10-26 DIAGNOSIS — R79.89 ELEVATED LACTIC ACID LEVEL: ICD-10-CM

## 2018-10-26 PROBLEM — A41.9 SEPSIS (HCC): Status: ACTIVE | Noted: 2018-10-26

## 2018-10-26 LAB
-: NORMAL
ABSOLUTE EOS #: 0 K/UL (ref 0–0.4)
ABSOLUTE IMMATURE GRANULOCYTE: ABNORMAL K/UL (ref 0–0.3)
ABSOLUTE LYMPH #: 1.1 K/UL (ref 1–4.8)
ABSOLUTE MONO #: 0.6 K/UL (ref 0.1–1.2)
AMORPHOUS: NORMAL
ANION GAP SERPL CALCULATED.3IONS-SCNC: 17 MMOL/L (ref 9–17)
BACTERIA: NORMAL
BASOPHILS # BLD: 0 % (ref 0–2)
BASOPHILS ABSOLUTE: 0 K/UL (ref 0–0.2)
BILIRUBIN URINE: NEGATIVE
BUN BLDV-MCNC: 18 MG/DL (ref 8–23)
BUN/CREAT BLD: 10 (ref 9–20)
CALCIUM SERPL-MCNC: 9.8 MG/DL (ref 8.6–10.4)
CASTS UA: NORMAL /LPF (ref 0–2)
CHLORIDE BLD-SCNC: 98 MMOL/L (ref 98–107)
CO2: 26 MMOL/L (ref 20–31)
COLOR: ABNORMAL
COMMENT UA: ABNORMAL
CREAT SERPL-MCNC: 1.78 MG/DL (ref 0.7–1.2)
CRYSTALS, UA: NORMAL /HPF
DIFFERENTIAL TYPE: ABNORMAL
DIRECT EXAM: NORMAL
DIRECT EXAM: NORMAL
EOSINOPHILS RELATIVE PERCENT: 0 % (ref 1–8)
EPITHELIAL CELLS UA: NORMAL /HPF (ref 0–5)
GFR AFRICAN AMERICAN: 45 ML/MIN
GFR NON-AFRICAN AMERICAN: 37 ML/MIN
GFR SERPL CREATININE-BSD FRML MDRD: ABNORMAL ML/MIN/{1.73_M2}
GFR SERPL CREATININE-BSD FRML MDRD: ABNORMAL ML/MIN/{1.73_M2}
GLUCOSE BLD-MCNC: 222 MG/DL (ref 70–99)
GLUCOSE BLD-MCNC: 287 MG/DL (ref 75–110)
GLUCOSE BLD-MCNC: 94 MG/DL (ref 75–110)
GLUCOSE URINE: ABNORMAL
HCT VFR BLD CALC: 49.9 % (ref 41–53)
HEMOGLOBIN: 16.3 G/DL (ref 13.5–17.5)
IMMATURE GRANULOCYTES: ABNORMAL %
KETONES, URINE: NEGATIVE
LACTIC ACID: 1.7 MMOL/L (ref 0.5–2.2)
LACTIC ACID: 2.9 MMOL/L (ref 0.5–2.2)
LACTIC ACID: 5.5 MMOL/L (ref 0.5–2.2)
LEUKOCYTE ESTERASE, URINE: NEGATIVE
LYMPHOCYTES # BLD: 13 % (ref 15–43)
Lab: NORMAL
MCH RBC QN AUTO: 28.9 PG (ref 26–34)
MCHC RBC AUTO-ENTMCNC: 32.8 G/DL (ref 31–37)
MCV RBC AUTO: 88.1 FL (ref 80–100)
MONOCYTES # BLD: 7 % (ref 6–14)
MUCUS: NORMAL
NITRITE, URINE: NEGATIVE
NRBC AUTOMATED: ABNORMAL PER 100 WBC
OTHER OBSERVATIONS UA: NORMAL
PDW BLD-RTO: 13.7 % (ref 11–14.5)
PH UA: 5.5 (ref 5–6)
PLATELET # BLD: 129 K/UL (ref 140–450)
PLATELET ESTIMATE: ABNORMAL
PMV BLD AUTO: 8.6 FL (ref 6–12)
POTASSIUM SERPL-SCNC: 3.7 MMOL/L (ref 3.7–5.3)
PROTEIN UA: NEGATIVE
RBC # BLD: 5.66 M/UL (ref 4.5–5.9)
RBC # BLD: ABNORMAL 10*6/UL
RBC UA: NORMAL /HPF (ref 0–4)
RENAL EPITHELIAL, UA: NORMAL /HPF
SEG NEUTROPHILS: 80 % (ref 44–74)
SEGMENTED NEUTROPHILS ABSOLUTE COUNT: 7.2 K/UL (ref 1.8–7.7)
SODIUM BLD-SCNC: 141 MMOL/L (ref 135–144)
SPECIFIC GRAVITY UA: 1.01 (ref 1.01–1.02)
SPECIMEN DESCRIPTION: NORMAL
STATUS: NORMAL
TRICHOMONAS: NORMAL
TROPONIN INTERP: NORMAL
TROPONIN INTERP: NORMAL
TROPONIN T: <0.03 NG/ML
TROPONIN T: <0.03 NG/ML
TURBIDITY: ABNORMAL
URINE HGB: ABNORMAL
UROBILINOGEN, URINE: NORMAL
WBC # BLD: 9 K/UL (ref 3.5–11)
WBC # BLD: ABNORMAL 10*3/UL
WBC UA: NORMAL /HPF (ref 0–4)
YEAST: NORMAL

## 2018-10-26 PROCEDURE — 81001 URINALYSIS AUTO W/SCOPE: CPT

## 2018-10-26 PROCEDURE — 1036F TOBACCO NON-USER: CPT | Performed by: OTOLARYNGOLOGY

## 2018-10-26 PROCEDURE — 99285 EMERGENCY DEPT VISIT HI MDM: CPT

## 2018-10-26 PROCEDURE — 1101F PT FALLS ASSESS-DOCD LE1/YR: CPT | Performed by: OTOLARYNGOLOGY

## 2018-10-26 PROCEDURE — 87040 BLOOD CULTURE FOR BACTERIA: CPT

## 2018-10-26 PROCEDURE — 2580000003 HC RX 258: Performed by: EMERGENCY MEDICINE

## 2018-10-26 PROCEDURE — 6360000002 HC RX W HCPCS: Performed by: INTERNAL MEDICINE

## 2018-10-26 PROCEDURE — G8417 CALC BMI ABV UP PARAM F/U: HCPCS | Performed by: OTOLARYNGOLOGY

## 2018-10-26 PROCEDURE — 82947 ASSAY GLUCOSE BLOOD QUANT: CPT

## 2018-10-26 PROCEDURE — 1123F ACP DISCUSS/DSCN MKR DOCD: CPT | Performed by: OTOLARYNGOLOGY

## 2018-10-26 PROCEDURE — 6370000000 HC RX 637 (ALT 250 FOR IP): Performed by: INTERNAL MEDICINE

## 2018-10-26 PROCEDURE — 99213 OFFICE O/P EST LOW 20 MIN: CPT | Performed by: OTOLARYNGOLOGY

## 2018-10-26 PROCEDURE — G8427 DOCREV CUR MEDS BY ELIG CLIN: HCPCS | Performed by: OTOLARYNGOLOGY

## 2018-10-26 PROCEDURE — 87804 INFLUENZA ASSAY W/OPTIC: CPT

## 2018-10-26 PROCEDURE — 6370000000 HC RX 637 (ALT 250 FOR IP): Performed by: EMERGENCY MEDICINE

## 2018-10-26 PROCEDURE — 99232 SBSQ HOSP IP/OBS MODERATE 35: CPT | Performed by: INTERNAL MEDICINE

## 2018-10-26 PROCEDURE — 83605 ASSAY OF LACTIC ACID: CPT

## 2018-10-26 PROCEDURE — 6370000000 HC RX 637 (ALT 250 FOR IP): Performed by: PHYSICIAN ASSISTANT

## 2018-10-26 PROCEDURE — 2580000003 HC RX 258: Performed by: PHYSICIAN ASSISTANT

## 2018-10-26 PROCEDURE — 4040F PNEUMOC VAC/ADMIN/RCVD: CPT | Performed by: OTOLARYNGOLOGY

## 2018-10-26 PROCEDURE — 36415 COLL VENOUS BLD VENIPUNCTURE: CPT

## 2018-10-26 PROCEDURE — 2580000003 HC RX 258: Performed by: INTERNAL MEDICINE

## 2018-10-26 PROCEDURE — 6360000002 HC RX W HCPCS: Performed by: EMERGENCY MEDICINE

## 2018-10-26 PROCEDURE — 2060000000 HC ICU INTERMEDIATE R&B

## 2018-10-26 PROCEDURE — 71046 X-RAY EXAM CHEST 2 VIEWS: CPT

## 2018-10-26 PROCEDURE — 3017F COLORECTAL CA SCREEN DOC REV: CPT | Performed by: OTOLARYNGOLOGY

## 2018-10-26 PROCEDURE — 85025 COMPLETE CBC W/AUTO DIFF WBC: CPT

## 2018-10-26 PROCEDURE — G8482 FLU IMMUNIZE ORDER/ADMIN: HCPCS | Performed by: OTOLARYNGOLOGY

## 2018-10-26 PROCEDURE — 80048 BASIC METABOLIC PNL TOTAL CA: CPT

## 2018-10-26 PROCEDURE — 84484 ASSAY OF TROPONIN QUANT: CPT

## 2018-10-26 RX ORDER — 0.9 % SODIUM CHLORIDE 0.9 %
1000 INTRAVENOUS SOLUTION INTRAVENOUS ONCE
Status: COMPLETED | OUTPATIENT
Start: 2018-10-26 | End: 2018-10-26

## 2018-10-26 RX ORDER — HEPARIN SODIUM 5000 [USP'U]/ML
5000 INJECTION, SOLUTION INTRAVENOUS; SUBCUTANEOUS EVERY 8 HOURS SCHEDULED
Status: DISCONTINUED | OUTPATIENT
Start: 2018-10-26 | End: 2018-10-27 | Stop reason: HOSPADM

## 2018-10-26 RX ORDER — PREDNISONE 20 MG/1
20 TABLET ORAL DAILY
Status: DISCONTINUED | OUTPATIENT
Start: 2018-10-27 | End: 2018-10-27 | Stop reason: HOSPADM

## 2018-10-26 RX ORDER — ACETAMINOPHEN 325 MG/1
650 TABLET ORAL EVERY 4 HOURS PRN
Status: DISCONTINUED | OUTPATIENT
Start: 2018-10-26 | End: 2018-10-27 | Stop reason: HOSPADM

## 2018-10-26 RX ORDER — INSULIN GLARGINE 100 [IU]/ML
10 INJECTION, SOLUTION SUBCUTANEOUS NIGHTLY
Status: DISCONTINUED | OUTPATIENT
Start: 2018-10-26 | End: 2018-10-27 | Stop reason: HOSPADM

## 2018-10-26 RX ORDER — DEXTROSE MONOHYDRATE 50 MG/ML
100 INJECTION, SOLUTION INTRAVENOUS PRN
Status: DISCONTINUED | OUTPATIENT
Start: 2018-10-26 | End: 2018-10-27 | Stop reason: HOSPADM

## 2018-10-26 RX ORDER — OSELTAMIVIR PHOSPHATE 30 MG/1
30 CAPSULE ORAL 2 TIMES DAILY
Status: DISCONTINUED | OUTPATIENT
Start: 2018-10-26 | End: 2018-10-26

## 2018-10-26 RX ORDER — SODIUM CHLORIDE 0.9 % (FLUSH) 0.9 %
10 SYRINGE (ML) INJECTION PRN
Status: DISCONTINUED | OUTPATIENT
Start: 2018-10-26 | End: 2018-10-27 | Stop reason: HOSPADM

## 2018-10-26 RX ORDER — NICOTINE POLACRILEX 4 MG
15 LOZENGE BUCCAL PRN
Status: CANCELLED | OUTPATIENT
Start: 2018-10-26

## 2018-10-26 RX ORDER — IBUPROFEN 800 MG/1
800 TABLET ORAL ONCE
Status: DISCONTINUED | OUTPATIENT
Start: 2018-10-26 | End: 2018-10-27 | Stop reason: HOSPADM

## 2018-10-26 RX ORDER — SODIUM CHLORIDE, SODIUM LACTATE, POTASSIUM CHLORIDE, CALCIUM CHLORIDE 600; 310; 30; 20 MG/100ML; MG/100ML; MG/100ML; MG/100ML
INJECTION, SOLUTION INTRAVENOUS CONTINUOUS
Status: DISCONTINUED | OUTPATIENT
Start: 2018-10-26 | End: 2018-10-27 | Stop reason: HOSPADM

## 2018-10-26 RX ORDER — LISINOPRIL 5 MG/1
20 TABLET ORAL DAILY
Status: DISCONTINUED | OUTPATIENT
Start: 2018-10-26 | End: 2018-10-27 | Stop reason: HOSPADM

## 2018-10-26 RX ORDER — TAMSULOSIN HYDROCHLORIDE 0.4 MG/1
0.4 CAPSULE ORAL DAILY
Status: DISCONTINUED | OUTPATIENT
Start: 2018-10-26 | End: 2018-10-27 | Stop reason: HOSPADM

## 2018-10-26 RX ORDER — OSELTAMIVIR PHOSPHATE 75 MG/1
75 CAPSULE ORAL 2 TIMES DAILY
Status: DISCONTINUED | OUTPATIENT
Start: 2018-10-26 | End: 2018-10-26 | Stop reason: DRUGHIGH

## 2018-10-26 RX ORDER — ASPIRIN 81 MG/1
81 TABLET ORAL DAILY
Status: DISCONTINUED | OUTPATIENT
Start: 2018-10-26 | End: 2018-10-27 | Stop reason: HOSPADM

## 2018-10-26 RX ORDER — 0.9 % SODIUM CHLORIDE 0.9 %
500 INTRAVENOUS SOLUTION INTRAVENOUS ONCE
Status: COMPLETED | OUTPATIENT
Start: 2018-10-26 | End: 2018-10-26

## 2018-10-26 RX ORDER — SIMVASTATIN 40 MG
40 TABLET ORAL NIGHTLY
Status: DISCONTINUED | OUTPATIENT
Start: 2018-10-26 | End: 2018-10-27 | Stop reason: HOSPADM

## 2018-10-26 RX ORDER — PANTOPRAZOLE SODIUM 40 MG/1
40 TABLET, DELAYED RELEASE ORAL
Status: DISCONTINUED | OUTPATIENT
Start: 2018-10-27 | End: 2018-10-27 | Stop reason: HOSPADM

## 2018-10-26 RX ORDER — DEXTROSE MONOHYDRATE 25 G/50ML
12.5 INJECTION, SOLUTION INTRAVENOUS PRN
Status: DISCONTINUED | OUTPATIENT
Start: 2018-10-26 | End: 2018-10-27 | Stop reason: HOSPADM

## 2018-10-26 RX ORDER — DEXTROSE MONOHYDRATE 50 MG/ML
100 INJECTION, SOLUTION INTRAVENOUS PRN
Status: CANCELLED | OUTPATIENT
Start: 2018-10-26

## 2018-10-26 RX ORDER — GLIMEPIRIDE 2 MG/1
4 TABLET ORAL DAILY
Status: DISCONTINUED | OUTPATIENT
Start: 2018-10-26 | End: 2018-10-27 | Stop reason: HOSPADM

## 2018-10-26 RX ORDER — OSELTAMIVIR PHOSPHATE 30 MG/1
30 CAPSULE ORAL 2 TIMES DAILY
Status: DISCONTINUED | OUTPATIENT
Start: 2018-10-26 | End: 2018-10-27 | Stop reason: HOSPADM

## 2018-10-26 RX ORDER — ACETAMINOPHEN 500 MG
1000 TABLET ORAL ONCE
Status: COMPLETED | OUTPATIENT
Start: 2018-10-26 | End: 2018-10-26

## 2018-10-26 RX ORDER — SODIUM CHLORIDE 0.9 % (FLUSH) 0.9 %
10 SYRINGE (ML) INJECTION EVERY 12 HOURS SCHEDULED
Status: DISCONTINUED | OUTPATIENT
Start: 2018-10-26 | End: 2018-10-27 | Stop reason: HOSPADM

## 2018-10-26 RX ORDER — MULTIVITAMIN WITH FOLIC ACID 400 MCG
1 TABLET ORAL DAILY
Status: DISCONTINUED | OUTPATIENT
Start: 2018-10-26 | End: 2018-10-27 | Stop reason: HOSPADM

## 2018-10-26 RX ORDER — NICOTINE POLACRILEX 4 MG
15 LOZENGE BUCCAL PRN
Status: DISCONTINUED | OUTPATIENT
Start: 2018-10-26 | End: 2018-10-27 | Stop reason: HOSPADM

## 2018-10-26 RX ORDER — DEXTROSE MONOHYDRATE 25 G/50ML
12.5 INJECTION, SOLUTION INTRAVENOUS PRN
Status: CANCELLED | OUTPATIENT
Start: 2018-10-26

## 2018-10-26 RX ADMIN — OSELTAMIVIR PHOSPHATE 30 MG: 30 CAPSULE ORAL at 17:59

## 2018-10-26 RX ADMIN — SODIUM CHLORIDE 1000 ML: 9 INJECTION, SOLUTION INTRAVENOUS at 17:52

## 2018-10-26 RX ADMIN — CEFEPIME HYDROCHLORIDE 2 G: 2 INJECTION, POWDER, FOR SOLUTION INTRAVENOUS at 17:24

## 2018-10-26 RX ADMIN — SODIUM CHLORIDE, POTASSIUM CHLORIDE, SODIUM LACTATE AND CALCIUM CHLORIDE: 600; 310; 30; 20 INJECTION, SOLUTION INTRAVENOUS at 22:12

## 2018-10-26 RX ADMIN — SODIUM CHLORIDE 1000 ML: 9 INJECTION, SOLUTION INTRAVENOUS at 14:24

## 2018-10-26 RX ADMIN — HEPARIN SODIUM 5000 UNITS: 5000 INJECTION INTRAVENOUS; SUBCUTANEOUS at 21:33

## 2018-10-26 RX ADMIN — SODIUM CHLORIDE 1000 ML: 9 INJECTION, SOLUTION INTRAVENOUS at 19:36

## 2018-10-26 RX ADMIN — ACETAMINOPHEN 650 MG: 325 TABLET ORAL at 19:35

## 2018-10-26 RX ADMIN — SODIUM CHLORIDE 1000 ML: 9 INJECTION, SOLUTION INTRAVENOUS at 15:26

## 2018-10-26 RX ADMIN — SODIUM CHLORIDE 500 ML: 9 INJECTION, SOLUTION INTRAVENOUS at 18:50

## 2018-10-26 RX ADMIN — SIMVASTATIN 40 MG: 40 TABLET, FILM COATED ORAL at 21:33

## 2018-10-26 RX ADMIN — INSULIN GLARGINE 10 UNITS: 100 INJECTION, SOLUTION SUBCUTANEOUS at 21:33

## 2018-10-26 RX ADMIN — PIPERACILLIN SODIUM,TAZOBACTAM SODIUM 3.38 G: 3; .375 INJECTION, POWDER, FOR SOLUTION INTRAVENOUS at 16:18

## 2018-10-26 RX ADMIN — ACETAMINOPHEN 1000 MG: 500 TABLET ORAL at 13:48

## 2018-10-26 ASSESSMENT — PAIN SCALES - GENERAL
PAINLEVEL_OUTOF10: 0

## 2018-10-26 ASSESSMENT — ENCOUNTER SYMPTOMS
NAUSEA: 0
VOMITING: 0
DIARRHEA: 0
COUGH: 1
SHORTNESS OF BREATH: 0

## 2018-10-26 NOTE — H&P
tablet, Take 500 mg by mouth 2 times daily (with meals)  tamsulosin (FLOMAX) 0.4 MG capsule, Take 0.4 mg by mouth daily  aspirin 81 MG tablet, Take 81 mg by mouth daily  Multiple Vitamins-Minerals (MULTIVITAMIN & MINERAL PO), Take by mouth  finasteride (PROSCAR) 5 MG tablet, Take 5 mg by mouth daily  [DISCONTINUED] metoprolol succinate (TOPROL XL) 25 MG extended release tablet, Take 25 mg by mouth daily    Allergies:    Crestor [rosuvastatin] and Meloxicam    Social History:    reports that he quit smoking about 25 years ago. He has never used smokeless tobacco. He reports that he drinks about 0.6 oz of alcohol per week . He reports that he does not use drugs. Family History:   family history includes Diabetes in his mother. REVIEW OF SYSTEMS:  See HPI and problem list; otherwise no other new complaints with respect to HEENT, neck, pulmonary, coronary, GI, , endocrine, musculoskeletal, immune system/connective tissue disease, hematologic, neuropsych, skin, lymphatics, or malignancies. PHYSICAL EXAM:  Vitals:  BP (!) 150/76   Pulse 101   Temp 103.2 °F (39.6 °C)   Resp 20   Ht 5' 8\" (1.727 m)   Wt 224 lb (101.6 kg)   SpO2 94%   BMI 34.06 kg/m²     HEENT: Normocephalic and Atraumatic  Neck: Supple, No Masses, Tenderness, Nodularity and No Lymphadenopathy  Chest/Lungs: Clear to Auscultation without Rales, Rhonchi, or Wheezes  Cardiac: Regular Rate and Rhythm  GI/Abdomen:  Bowel Sounds Present, Soft, Non-tender, without Guarding or Rebound Tenderness, No Masses and No Tenderness  : Not examined  EXT/Skin: No Edema, No Cyanosis and No Clubbing-----face reddened----good capillary refill  Neuro: Alert and Oriented and No Localizing Signs/Symptoms        LABS:    CBC with Differential:    Lab Results   Component Value Date    WBC 9.0 10/26/2018    RBC 5.66 10/26/2018    RBC ABSENT 10/04/2018    HGB 16.3 10/26/2018    HCT 49.9 10/26/2018     10/26/2018    MCV 88.1 10/26/2018    MCH 28.9 10/26/2018

## 2018-10-26 NOTE — ED NOTES
Writer called Danielle Springer back, advising her of bed number.      Amalia Fournier RN  10/26/18 3700

## 2018-10-27 ENCOUNTER — APPOINTMENT (OUTPATIENT)
Dept: GENERAL RADIOLOGY | Age: 73
DRG: 864 | End: 2018-10-27
Payer: MEDICARE

## 2018-10-27 VITALS
TEMPERATURE: 98.6 F | BODY MASS INDEX: 35.42 KG/M2 | RESPIRATION RATE: 18 BRPM | OXYGEN SATURATION: 94 % | SYSTOLIC BLOOD PRESSURE: 140 MMHG | WEIGHT: 233.69 LBS | HEIGHT: 68 IN | HEART RATE: 63 BPM | DIASTOLIC BLOOD PRESSURE: 59 MMHG

## 2018-10-27 LAB
ABSOLUTE EOS #: 0 K/UL (ref 0–0.4)
ABSOLUTE IMMATURE GRANULOCYTE: ABNORMAL K/UL (ref 0–0.3)
ABSOLUTE LYMPH #: 1 K/UL (ref 1–4.8)
ABSOLUTE MONO #: 0.6 K/UL (ref 0.1–1.2)
ANION GAP SERPL CALCULATED.3IONS-SCNC: 9 MMOL/L (ref 9–17)
BASOPHILS # BLD: 1 % (ref 0–2)
BASOPHILS ABSOLUTE: 0 K/UL (ref 0–0.2)
BUN BLDV-MCNC: 15 MG/DL (ref 8–23)
BUN/CREAT BLD: 11 (ref 9–20)
CALCIUM SERPL-MCNC: 7.9 MG/DL (ref 8.6–10.4)
CHLORIDE BLD-SCNC: 111 MMOL/L (ref 98–107)
CO2: 21 MMOL/L (ref 20–31)
CREAT SERPL-MCNC: 1.42 MG/DL (ref 0.7–1.2)
DIFFERENTIAL TYPE: ABNORMAL
EKG ATRIAL RATE: 65 BPM
EKG P AXIS: 15 DEGREES
EKG P-R INTERVAL: 206 MS
EKG Q-T INTERVAL: 374 MS
EKG QRS DURATION: 100 MS
EKG QTC CALCULATION (BAZETT): 388 MS
EKG R AXIS: -2 DEGREES
EKG T AXIS: -71 DEGREES
EKG VENTRICULAR RATE: 65 BPM
EOSINOPHILS RELATIVE PERCENT: 0 % (ref 1–8)
GFR AFRICAN AMERICAN: 59 ML/MIN
GFR NON-AFRICAN AMERICAN: 49 ML/MIN
GFR SERPL CREATININE-BSD FRML MDRD: ABNORMAL ML/MIN/{1.73_M2}
GFR SERPL CREATININE-BSD FRML MDRD: ABNORMAL ML/MIN/{1.73_M2}
GLUCOSE BLD-MCNC: 61 MG/DL (ref 70–99)
GLUCOSE BLD-MCNC: 87 MG/DL (ref 75–110)
HCT VFR BLD CALC: 38.9 % (ref 41–53)
HEMOGLOBIN: 12.9 G/DL (ref 13.5–17.5)
IMMATURE GRANULOCYTES: ABNORMAL %
LYMPHOCYTES # BLD: 22 % (ref 15–43)
MCH RBC QN AUTO: 28.8 PG (ref 26–34)
MCHC RBC AUTO-ENTMCNC: 33.1 G/DL (ref 31–37)
MCV RBC AUTO: 87 FL (ref 80–100)
MONOCYTES # BLD: 13 % (ref 6–14)
NRBC AUTOMATED: ABNORMAL PER 100 WBC
PDW BLD-RTO: 13.7 % (ref 11–14.5)
PLATELET # BLD: 99 K/UL (ref 140–450)
PLATELET ESTIMATE: ABNORMAL
PMV BLD AUTO: 8.4 FL (ref 6–12)
POTASSIUM SERPL-SCNC: 3.8 MMOL/L (ref 3.7–5.3)
RBC # BLD: 4.47 M/UL (ref 4.5–5.9)
RBC # BLD: ABNORMAL 10*6/UL
SEG NEUTROPHILS: 64 % (ref 44–74)
SEGMENTED NEUTROPHILS ABSOLUTE COUNT: 3.1 K/UL (ref 1.8–7.7)
SODIUM BLD-SCNC: 141 MMOL/L (ref 135–144)
TROPONIN INTERP: NORMAL
TROPONIN INTERP: NORMAL
TROPONIN T: <0.03 NG/ML
TROPONIN T: <0.03 NG/ML
WBC # BLD: 4.8 K/UL (ref 3.5–11)
WBC # BLD: ABNORMAL 10*3/UL

## 2018-10-27 PROCEDURE — 2580000003 HC RX 258: Performed by: INTERNAL MEDICINE

## 2018-10-27 PROCEDURE — 6370000000 HC RX 637 (ALT 250 FOR IP): Performed by: PHYSICIAN ASSISTANT

## 2018-10-27 PROCEDURE — 84484 ASSAY OF TROPONIN QUANT: CPT

## 2018-10-27 PROCEDURE — 71046 X-RAY EXAM CHEST 2 VIEWS: CPT

## 2018-10-27 PROCEDURE — 6360000002 HC RX W HCPCS: Performed by: INTERNAL MEDICINE

## 2018-10-27 PROCEDURE — 80048 BASIC METABOLIC PNL TOTAL CA: CPT

## 2018-10-27 PROCEDURE — 82947 ASSAY GLUCOSE BLOOD QUANT: CPT

## 2018-10-27 PROCEDURE — 94761 N-INVAS EAR/PLS OXIMETRY MLT: CPT

## 2018-10-27 PROCEDURE — 93005 ELECTROCARDIOGRAM TRACING: CPT

## 2018-10-27 PROCEDURE — 2580000003 HC RX 258: Performed by: EMERGENCY MEDICINE

## 2018-10-27 PROCEDURE — 85025 COMPLETE CBC W/AUTO DIFF WBC: CPT

## 2018-10-27 PROCEDURE — 36415 COLL VENOUS BLD VENIPUNCTURE: CPT

## 2018-10-27 PROCEDURE — 6360000002 HC RX W HCPCS: Performed by: EMERGENCY MEDICINE

## 2018-10-27 PROCEDURE — 99239 HOSP IP/OBS DSCHRG MGMT >30: CPT | Performed by: FAMILY MEDICINE

## 2018-10-27 PROCEDURE — 6370000000 HC RX 637 (ALT 250 FOR IP): Performed by: INTERNAL MEDICINE

## 2018-10-27 RX ADMIN — HEPARIN SODIUM 5000 UNITS: 5000 INJECTION INTRAVENOUS; SUBCUTANEOUS at 06:58

## 2018-10-27 RX ADMIN — PIPERACILLIN SODIUM,TAZOBACTAM SODIUM 3.38 G: 3; .375 INJECTION, POWDER, FOR SOLUTION INTRAVENOUS at 08:50

## 2018-10-27 RX ADMIN — OSELTAMIVIR PHOSPHATE 30 MG: 30 CAPSULE ORAL at 08:50

## 2018-10-27 RX ADMIN — PIPERACILLIN SODIUM,TAZOBACTAM SODIUM 3.38 G: 3; .375 INJECTION, POWDER, FOR SOLUTION INTRAVENOUS at 00:04

## 2018-10-27 RX ADMIN — PREDNISONE 20 MG: 20 TABLET ORAL at 08:50

## 2018-10-27 RX ADMIN — PANTOPRAZOLE SODIUM 40 MG: 40 TABLET, DELAYED RELEASE ORAL at 06:58

## 2018-10-27 RX ADMIN — INSULIN LISPRO 3 UNITS: 100 INJECTION, SOLUTION INTRAVENOUS; SUBCUTANEOUS at 08:49

## 2018-10-27 RX ADMIN — SODIUM CHLORIDE, POTASSIUM CHLORIDE, SODIUM LACTATE AND CALCIUM CHLORIDE: 600; 310; 30; 20 INJECTION, SOLUTION INTRAVENOUS at 02:55

## 2018-10-27 RX ADMIN — INSULIN LISPRO 3 UNITS: 100 INJECTION, SOLUTION INTRAVENOUS; SUBCUTANEOUS at 12:19

## 2018-10-27 RX ADMIN — CEFEPIME HYDROCHLORIDE 2 G: 2 INJECTION, POWDER, FOR SOLUTION INTRAVENOUS at 02:55

## 2018-10-27 RX ADMIN — ACETAMINOPHEN 650 MG: 325 TABLET ORAL at 08:58

## 2018-10-27 RX ADMIN — LINAGLIPTIN 5 MG: 5 TABLET, FILM COATED ORAL at 08:50

## 2018-10-27 RX ADMIN — GLIMEPIRIDE 4 MG: 2 TABLET ORAL at 08:50

## 2018-10-27 RX ADMIN — ACETAMINOPHEN 650 MG: 325 TABLET ORAL at 02:55

## 2018-10-27 RX ADMIN — TAMSULOSIN HYDROCHLORIDE 0.4 MG: 0.4 CAPSULE ORAL at 08:50

## 2018-10-27 RX ADMIN — THERA TABS 1 TABLET: TAB at 08:50

## 2018-10-27 RX ADMIN — ASPIRIN 81 MG: 81 TABLET, COATED ORAL at 08:50

## 2018-10-27 RX ADMIN — LISINOPRIL 20 MG: 5 TABLET ORAL at 08:50

## 2018-10-27 ASSESSMENT — PAIN SCALES - GENERAL
PAINLEVEL_OUTOF10: 0

## 2018-10-27 NOTE — PLAN OF CARE
Problem:  Activity Intolerance  Goal: Able to perform ADL with assistance  Able to perform ADL with assistance     Outcome: Ongoing      Problem: Pediatric High Fall Risk  Goal: Able to ambulate with minimal assistance  Able to ambulate with minimal assistance     Outcome: Ongoing

## 2018-10-27 NOTE — FLOWSHEET NOTE
Have made multiple attempts to contact family re: discharge. Messages have been left on answering machines of 3 family members requesting that they call here. Pt informed.

## 2018-10-27 NOTE — PROGRESS NOTES
Physical Therapy      Patient declined PT services, stating they are at their baseline level of function. Patient notes they are independent with functional mobility and transfers. Patient discharged per their request. Patient was advised to inform staff if there is a decline in function or if they have any questions, and therapy can return at that time. Patient verbalized understanding.     Estefania Ladd PT
(HUMALOG) injection vial 0-6 Units  0-6 Units Subcutaneous TID  Chava Goodwin        insulin lispro (HUMALOG) injection vial 0-3 Units  0-3 Units Subcutaneous Nightly Larwance Graven        glucose (GLUTOSE) 40 % oral gel 15 g  15 g Oral PRN Larwance Graven        dextrose 50 % solution 12.5 g  12.5 g Intravenous PRN Larwance Graven        glucagon (rDNA) injection 1 mg  1 mg Intramuscular PRN Larwance Graven        dextrose 5 % solution  100 mL/hr Intravenous PRN Larwance Graven        oseltamivir (TAMIFLU) capsule 30 mg  30 mg Oral BID Larwance Graven        [START ON 10/27/2018] piperacillin-tazobactam (ZOSYN) 3.375 g in dextrose 5 % 50 mL IVPB (mini-bag)  3.375 g Intravenous Q8H Chava Goodwin         Allergies   Allergen Reactions    Crestor [Rosuvastatin]     Meloxicam      Active Problems:    Sepsis ([address]    Fever  Resolved Problems:    * No resolved hospital problems. *    Blood pressure (!) 150/60, pulse 98, temperature 100.6 °F (38.1 °C), temperature source Oral, resp. rate 16, height 5' 8\" (1.727 m), weight 233 lb 11 oz (106 kg), SpO2 94 %. Subjective:  Symptoms:  Improved. He reports cough and weakness. No shortness of breath, malaise, chest pain, headache, chest pressure, anorexia, diarrhea or anxiety. Diet:  Adequate intake. No nausea or vomiting. Activity level: Impaired due to weakness. Pain:  He reports pain is improving (fever 100.5 improved). Pain is well controlled.       Objective  Assessment & Plan    Elza Meyers RN  10/26/2018
TID  Chava Goodwin        insulin lispro (HUMALOG) injection vial 0-3 Units  0-3 Units Subcutaneous Nightly Charo Skpetros        glucose (GLUTOSE) 40 % oral gel 15 g  15 g Oral PRN Charo Skates        dextrose 50 % solution 12.5 g  12.5 g Intravenous PRN Charo Skates        glucagon (rDNA) injection 1 mg  1 mg Intramuscular PRN Cahro Skates        dextrose 5 % solution  100 mL/hr Intravenous PRN Charo Skates        [START ON 10/27/2018] piperacillin-tazobactam (ZOSYN) 3.375 g in dextrose 5 % 50 mL IVPB (mini-bag)  3.375 g Intravenous Q8H Chava Goodwin        acetaminophen (TYLENOL) tablet 650 mg  650 mg Oral Q4H PRN Jose Wyman PA-C   650 mg at 10/26/18 1935    oseltamivir (TAMIFLU) capsule 30 mg  30 mg Oral BID Jose Wyman PA-C        ibuprofen (ADVIL;MOTRIN) tablet 800 mg  800 mg Oral Once Jose Wyman PA-C         Allergies   Allergen Reactions    Crestor [Rosuvastatin]     Meloxicam      Active Problems:    Sepsis ([address]    Fever  Resolved Problems:    * No resolved hospital problems. *    Blood pressure 138/62, pulse 70, temperature 101.2 °F (38.4 °C), temperature source Tympanic, resp. rate 18, height 5' 8\" (1.727 m), weight 233 lb 11 oz (106 kg), SpO2 95 %. Subjective Patient is doing well. Breathing easily. Objective  Patient is well appearing in no acute distress. The patient is breathing easily, heart is RRR. The patient's lower extremities have a full ROM and no edema.     Assessment & Plan  Sepsis - sepsis ruled out, lactic down, pulse normal, blood pressure stable, continue abx, and tamiflu until PCR comes back    Jose Wyman PA-C  10/26/2018
Ref Range    Troponin T <0.03 <0.03 ng/mL    Troponin Interp         POC Glucose Fingerstick    Collection Time: 10/26/18  9:15 PM   Result Value Ref Range    POC Glucose 94 75 - 110 mg/dL   Lactic Acid    Collection Time: 10/26/18  9:18 PM   Result Value Ref Range    Lactic Acid 1.7 0.5 - 2.2 mmol/L   Troponin    Collection Time: 10/27/18 12:44 AM   Result Value Ref Range    Troponin T <0.03 <0.03 ng/mL    Troponin Interp         CBC Auto Differential    Collection Time: 10/27/18  6:32 AM   Result Value Ref Range    WBC 4.8 3.5 - 11.0 k/uL    RBC 4.47 (L) 4.5 - 5.9 m/uL    Hemoglobin 12.9 (L) 13.5 - 17.5 g/dL    Hematocrit 38.9 (L) 41 - 53 %    MCV 87.0 80 - 100 fL    MCH 28.8 26 - 34 pg    MCHC 33.1 31 - 37 g/dL    RDW 13.7 11.0 - 14.5 %    Platelets 99 (L) 600 - 450 k/uL    MPV 8.4 6.0 - 12.0 fL    NRBC Automated NOT REPORTED per 100 WBC    Differential Type NOT REPORTED     Immature Granulocytes NOT REPORTED 0 %    Absolute Immature Granulocyte NOT REPORTED 0.00 - 0.30 k/uL    WBC Morphology NOT REPORTED     RBC Morphology NOT REPORTED     Platelet Estimate NOT REPORTED     Seg Neutrophils 64 44 - 74 %    Lymphocytes 22 15 - 43 %    Monocytes 13 6 - 14 %    Eosinophils % 0 (L) 1 - 8 %    Basophils 1 0 - 2 %    Segs Absolute 3.10 1.8 - 7.7 k/uL    Absolute Lymph # 1.00 1.0 - 4.8 k/uL    Absolute Mono # 0.60 0.1 - 1.2 k/uL    Absolute Eos # 0.00 0.0 - 0.4 k/uL    Basophils # 0.00 0.0 - 0.2 k/uL   Basic Metabolic Panel    Collection Time: 10/27/18  6:32 AM   Result Value Ref Range    Glucose 61 (L) 70 - 99 mg/dL    BUN 15 8 - 23 mg/dL    CREATININE 1.42 (H) 0.70 - 1.20 mg/dL    Bun/Cre Ratio 11 9 - 20    Calcium 7.9 (L) 8.6 - 10.4 mg/dL    Sodium 141 135 - 144 mmol/L    Potassium 3.8 3.7 - 5.3 mmol/L    Chloride 111 (H) 98 - 107 mmol/L    CO2 21 20 - 31 mmol/L    Anion Gap 9 9 - 17 mmol/L    GFR Non-African American 49 (L) >60 mL/min    GFR  59 (L) >60 mL/min    GFR Comment          GFR Staging NOT

## 2018-10-27 NOTE — DISCHARGE SUMMARY
Hospitalist Discharge Summary    Rush Kelly  :  1945  MRN:  [id number]    Admit date:  10/26/2018  Discharge date: 10/28/2018     Admitting Physician:  Carolyn Whitmore    Discharge Diagnoses:   Patient Active Problem List   Diagnosis    Type 2 diabetes mellitus without complication ([facility]    Gastroesophageal reflux disease without esophagitis    Hyperlipidemia    Mild persistent asthma without complication    Elevated PSA    Male erectile disorder (CODE)    Melanoma in situ of other site [facility])    Other specified disorders of brain    Sepsis ([facility]    Fever        Admission Condition:  fair      Discharged Condition:  good    Hospital Course/Treatments   Pt was admitted with high grade fever, pt was started on antibiotics and tamiflu, pt had influ titre drawn which were negative and pt blood culture were negative, pt antibiotics and tamiflu were dis continued, pt will follow up with pcp in 1 week,     Discharge Medications:       Ryan Wynne   Home Medication Instructions [id number]    Printed on:10/27/18 7233   Medication Information                      aspirin 81 MG tablet  Take 81 mg by mouth daily             bisacodyl (BISACODYL) 5 MG EC tablet  Take 1 tablet by mouth See Admin Instructions             dapagliflozin (FARXIGA) 10 MG tablet  Take 1 tablet by mouth every morning             DULERA 200-5 MCG/ACT inhaler  USE 2 INHALATIONS EVERY 12 HOURS             esomeprazole (NEXIUM) 40 MG delayed release capsule  TAKE 1 CAPSULE EVERY MORNING BEFORE BREAKFAST             finasteride (PROSCAR) 5 MG tablet  Take 5 mg by mouth daily             glimepiride (AMARYL) 4 MG tablet  TAKE 1 TABLET DAILY             lisinopril (PRINIVIL;ZESTRIL) 20 MG tablet  Take 1 tablet by mouth daily             metFORMIN, MOD, (GLUMETZA) 500 MG extended release tablet  TAKE 2 TABLETS TWICE A DAY             Multiple Vitamins-Minerals (MULTIVITAMIN & MINERAL PO)  Take by mouth             naproxen (NAPROSYN) Instructions: Activity: activity as tolerated  Diet:  diabetic diet    Follow up with Brianne Amezcua MD in 1 weeks.     Signed:  Levar Esquivel  10/27/2018, 2:40 PM    Time spent in discharge of this pt is more than 30 minutes in examination,evaluvation,  counseling and review of medication and discharge plan

## 2018-10-29 ENCOUNTER — CARE COORDINATION (OUTPATIENT)
Dept: CASE MANAGEMENT | Age: 73
End: 2018-10-29

## 2018-10-30 ENCOUNTER — CARE COORDINATION (OUTPATIENT)
Dept: CASE MANAGEMENT | Age: 73
End: 2018-10-30

## 2018-11-01 LAB
CULTURE: NORMAL
Lab: NORMAL
SPECIMEN DESCRIPTION: NORMAL
STATUS: NORMAL

## 2018-11-05 ENCOUNTER — TELEPHONE (OUTPATIENT)
Dept: UROLOGY | Age: 73
End: 2018-11-05

## 2018-11-05 DIAGNOSIS — R35.0 FREQUENCY OF MICTURITION: Primary | ICD-10-CM

## 2018-11-07 ENCOUNTER — OFFICE VISIT (OUTPATIENT)
Dept: UROLOGY | Age: 73
End: 2018-11-07
Payer: MEDICARE

## 2018-11-07 VITALS
DIASTOLIC BLOOD PRESSURE: 84 MMHG | WEIGHT: 233.69 LBS | SYSTOLIC BLOOD PRESSURE: 138 MMHG | BODY MASS INDEX: 35.53 KG/M2 | TEMPERATURE: 97.8 F | HEART RATE: 92 BPM

## 2018-11-07 DIAGNOSIS — N30.00 ACUTE CYSTITIS WITHOUT HEMATURIA: ICD-10-CM

## 2018-11-07 DIAGNOSIS — C61 PROSTATE CANCER (HCC): Primary | ICD-10-CM

## 2018-11-07 PROCEDURE — G8482 FLU IMMUNIZE ORDER/ADMIN: HCPCS | Performed by: UROLOGY

## 2018-11-07 PROCEDURE — 3017F COLORECTAL CA SCREEN DOC REV: CPT | Performed by: UROLOGY

## 2018-11-07 PROCEDURE — 1036F TOBACCO NON-USER: CPT | Performed by: UROLOGY

## 2018-11-07 PROCEDURE — 1101F PT FALLS ASSESS-DOCD LE1/YR: CPT | Performed by: UROLOGY

## 2018-11-07 PROCEDURE — G8427 DOCREV CUR MEDS BY ELIG CLIN: HCPCS | Performed by: UROLOGY

## 2018-11-07 PROCEDURE — 99213 OFFICE O/P EST LOW 20 MIN: CPT | Performed by: UROLOGY

## 2018-11-07 PROCEDURE — 4040F PNEUMOC VAC/ADMIN/RCVD: CPT | Performed by: UROLOGY

## 2018-11-07 PROCEDURE — G8417 CALC BMI ABV UP PARAM F/U: HCPCS | Performed by: UROLOGY

## 2018-11-07 PROCEDURE — 1123F ACP DISCUSS/DSCN MKR DOCD: CPT | Performed by: UROLOGY

## 2018-11-07 PROCEDURE — 1111F DSCHRG MED/CURRENT MED MERGE: CPT | Performed by: UROLOGY

## 2018-11-07 RX ORDER — SULFAMETHOXAZOLE AND TRIMETHOPRIM 800; 160 MG/1; MG/1
1 TABLET ORAL 2 TIMES DAILY
Qty: 14 TABLET | Refills: 0 | Status: SHIPPED | OUTPATIENT
Start: 2018-11-07 | End: 2018-11-14

## 2018-11-27 ENCOUNTER — OFFICE VISIT (OUTPATIENT)
Dept: FAMILY MEDICINE CLINIC | Age: 73
End: 2018-11-27
Payer: MEDICARE

## 2018-11-27 VITALS
HEART RATE: 69 BPM | SYSTOLIC BLOOD PRESSURE: 120 MMHG | DIASTOLIC BLOOD PRESSURE: 70 MMHG | OXYGEN SATURATION: 97 % | WEIGHT: 221 LBS | BODY MASS INDEX: 33.6 KG/M2

## 2018-11-27 DIAGNOSIS — S86.912A MUSCLE STRAIN OF LEFT LOWER LEG, INITIAL ENCOUNTER: Primary | ICD-10-CM

## 2018-11-27 PROCEDURE — G8417 CALC BMI ABV UP PARAM F/U: HCPCS | Performed by: FAMILY MEDICINE

## 2018-11-27 PROCEDURE — G8427 DOCREV CUR MEDS BY ELIG CLIN: HCPCS | Performed by: FAMILY MEDICINE

## 2018-11-27 PROCEDURE — 1123F ACP DISCUSS/DSCN MKR DOCD: CPT | Performed by: FAMILY MEDICINE

## 2018-11-27 PROCEDURE — 1101F PT FALLS ASSESS-DOCD LE1/YR: CPT | Performed by: FAMILY MEDICINE

## 2018-11-27 PROCEDURE — 3017F COLORECTAL CA SCREEN DOC REV: CPT | Performed by: FAMILY MEDICINE

## 2018-11-27 PROCEDURE — 4040F PNEUMOC VAC/ADMIN/RCVD: CPT | Performed by: FAMILY MEDICINE

## 2018-11-27 PROCEDURE — G8482 FLU IMMUNIZE ORDER/ADMIN: HCPCS | Performed by: FAMILY MEDICINE

## 2018-11-27 PROCEDURE — 1036F TOBACCO NON-USER: CPT | Performed by: FAMILY MEDICINE

## 2018-11-27 PROCEDURE — 99213 OFFICE O/P EST LOW 20 MIN: CPT | Performed by: FAMILY MEDICINE

## 2018-11-27 RX ORDER — NAPROXEN 500 MG/1
500 TABLET ORAL 2 TIMES DAILY WITH MEALS
Qty: 30 TABLET | Refills: 0 | Status: SHIPPED | OUTPATIENT
Start: 2018-11-27 | End: 2018-12-20

## 2018-11-27 ASSESSMENT — ENCOUNTER SYMPTOMS
BACK PAIN: 0
RESPIRATORY NEGATIVE: 1

## 2018-11-27 NOTE — PROGRESS NOTES
GLUCOSE 3+ 11/07/2018    CALCIUM 7.9 10/27/2018      Lab Results   Component Value Date    CHOL 131 09/22/2018    CHOL 144 09/07/2016    TRIG 110 09/22/2018    HDL 36 09/07/2016       Subjective:      Review of Systems   Constitutional: Positive for activity change. Negative for fever. Respiratory: Negative. Cardiovascular: Negative. Musculoskeletal: Positive for gait problem and myalgias. Negative for arthralgias, back pain and joint swelling. Skin: Negative for rash. Objective:     /70   Pulse 69   Wt 221 lb (100.2 kg)   SpO2 97%   BMI 33.60 kg/m²     Physical Exam   Constitutional: No distress. Cardiovascular: Normal rate, regular rhythm, S1 normal, S2 normal and normal heart sounds. Pulmonary/Chest: Breath sounds normal.   Musculoskeletal:        Left upper leg: He exhibits tenderness. He exhibits no bony tenderness, no swelling, no edema and no deformity. Legs:  His pain is reproduced with contracture of the leg musculature. I.e. leg extension       Assessment:      Diagnosis Orders   1. Muscle strain of left lower leg, initial encounter  naproxen (NAPROSYN) 500 MG tablet            POC Testing Results (If Applicable):  No results found for this visit on 11/27/18. Plan: At this time he should just try some naproxen. This appears to be a muscle strain. Nothing else. Return to the office as scheduled in one month      Orders Given:  No orders of the defined types were placed in this encounter. Prescriptions:    Orders Placed This Encounter   Medications    naproxen (NAPROSYN) 500 MG tablet     Sig: Take 1 tablet by mouth 2 times daily (with meals)     Dispense:  30 tablet     Refill:  0        Return in about 3 weeks (around 12/20/2018). Electronically signed by Migue Neri MD on11/27/2018. **This report has been created using voice recognition software. It may contain minor errors which are inherent in voice recognition technology. **

## 2018-12-18 ENCOUNTER — TELEPHONE (OUTPATIENT)
Dept: FAMILY MEDICINE CLINIC | Age: 73
End: 2018-12-18

## 2018-12-18 DIAGNOSIS — E11.9 TYPE 2 DIABETES MELLITUS WITHOUT COMPLICATION, WITHOUT LONG-TERM CURRENT USE OF INSULIN (HCC): Primary | ICD-10-CM

## 2018-12-20 ENCOUNTER — OFFICE VISIT (OUTPATIENT)
Dept: FAMILY MEDICINE CLINIC | Age: 73
End: 2018-12-20
Payer: MEDICARE

## 2018-12-20 VITALS
DIASTOLIC BLOOD PRESSURE: 70 MMHG | WEIGHT: 221 LBS | HEIGHT: 68 IN | SYSTOLIC BLOOD PRESSURE: 122 MMHG | BODY MASS INDEX: 33.49 KG/M2 | HEART RATE: 67 BPM | OXYGEN SATURATION: 97 %

## 2018-12-20 DIAGNOSIS — E11.9 TYPE 2 DIABETES MELLITUS WITHOUT COMPLICATION, UNSPECIFIED WHETHER LONG TERM INSULIN USE (HCC): Primary | ICD-10-CM

## 2018-12-20 DIAGNOSIS — M54.5 ACUTE LEFT-SIDED LOW BACK PAIN, WITH SCIATICA PRESENCE UNSPECIFIED: ICD-10-CM

## 2018-12-20 DIAGNOSIS — E78.2 MIXED HYPERLIPIDEMIA: ICD-10-CM

## 2018-12-20 DIAGNOSIS — K21.9 GASTROESOPHAGEAL REFLUX DISEASE WITHOUT ESOPHAGITIS: ICD-10-CM

## 2018-12-20 DIAGNOSIS — I10 ESSENTIAL HYPERTENSION: ICD-10-CM

## 2018-12-20 DIAGNOSIS — Z23 NEED FOR SHINGLES VACCINE: ICD-10-CM

## 2018-12-20 LAB — HBA1C MFR BLD: 6.2 %

## 2018-12-20 PROCEDURE — G8482 FLU IMMUNIZE ORDER/ADMIN: HCPCS | Performed by: FAMILY MEDICINE

## 2018-12-20 PROCEDURE — 1123F ACP DISCUSS/DSCN MKR DOCD: CPT | Performed by: FAMILY MEDICINE

## 2018-12-20 PROCEDURE — G8417 CALC BMI ABV UP PARAM F/U: HCPCS | Performed by: FAMILY MEDICINE

## 2018-12-20 PROCEDURE — 1036F TOBACCO NON-USER: CPT | Performed by: FAMILY MEDICINE

## 2018-12-20 PROCEDURE — 83036 HEMOGLOBIN GLYCOSYLATED A1C: CPT | Performed by: FAMILY MEDICINE

## 2018-12-20 PROCEDURE — 3017F COLORECTAL CA SCREEN DOC REV: CPT | Performed by: FAMILY MEDICINE

## 2018-12-20 PROCEDURE — G8427 DOCREV CUR MEDS BY ELIG CLIN: HCPCS | Performed by: FAMILY MEDICINE

## 2018-12-20 PROCEDURE — 99214 OFFICE O/P EST MOD 30 MIN: CPT | Performed by: FAMILY MEDICINE

## 2018-12-20 PROCEDURE — 1101F PT FALLS ASSESS-DOCD LE1/YR: CPT | Performed by: FAMILY MEDICINE

## 2018-12-20 PROCEDURE — 4040F PNEUMOC VAC/ADMIN/RCVD: CPT | Performed by: FAMILY MEDICINE

## 2018-12-20 PROCEDURE — 3044F HG A1C LEVEL LT 7.0%: CPT | Performed by: FAMILY MEDICINE

## 2018-12-20 PROCEDURE — 2022F DILAT RTA XM EVC RTNOPTHY: CPT | Performed by: FAMILY MEDICINE

## 2018-12-26 NOTE — PROGRESS NOTES
morning 90 tablet 3    PROAIR  (90 Base) MCG/ACT inhaler USE 2 INHALATIONS EVERY 6 HOURS AS NEEDED FOR WHEEZING 25.5 g 3    DULERA 200-5 MCG/ACT inhaler USE 2 INHALATIONS EVERY 12 HOURS 39 g 3    ONE TOUCH ULTRA TEST strip USE TO TEST BLOOD SUGAR TWICE A  each 3    glimepiride (AMARYL) 4 MG tablet TAKE 1 TABLET DAILY 90 tablet 3    metFORMIN, MOD, (GLUMETZA) 500 MG extended release tablet TAKE 2 TABLETS TWICE A  tablet 3    SITagliptin (JANUVIA) 100 MG tablet Take 1 tablet by mouth daily 90 tablet 3    simvastatin (ZOCOR) 40 MG tablet TAKE 1 TABLET DAILY AT BEDTIME 90 tablet 3    lisinopril (PRINIVIL;ZESTRIL) 20 MG tablet Take 1 tablet by mouth daily 90 tablet 3    aspirin 81 MG tablet Take 81 mg by mouth daily      Multiple Vitamins-Minerals (MULTIVITAMIN & MINERAL PO) Take by mouth       No current facility-administered medications for this visit.       Allergies   Allergen Reactions    Crestor [Rosuvastatin]     Meloxicam        Past Medical History:   Diagnosis Date    GERD (gastroesophageal reflux disease)     Hyperlipidemia     [facility]     Rheumatoid arthritis ([facility]       Past Surgical History:   Procedure Laterality Date    CARDIAC CATHETERIZATION      CHOLECYSTECTOMY      COLONOSCOPY       Family History   Problem Relation Age of Onset    Diabetes Mother      Social History   Substance Use Topics    Smoking status: Former Smoker     Packs/day: 1.00     Years: 45.00     Quit date: 1993    Smokeless tobacco: Never Used      Comment: loc rrt,10/26/2018    Alcohol use 0.6 oz/week     1 Cans of beer per week        Health Maintenance   Topic Date Due    Diabetic foot exam  01/24/1955    Diabetic retinal exam  04/12/2014    Shingles Vaccine (1 of 2 - 2 Dose Series) 12/21/2016    Pneumococcal low/med risk (2 of 2 - PPSV23) 09/11/2018    Lipid screen  09/22/2019    Potassium monitoring  10/27/2019    Creatinine monitoring  10/27/2019    A1C test (Diabetic or Prediabetic)  12/20/2019    DTaP/Tdap/Td vaccine (2 - Td) 09/11/2027    Colon cancer screen colonoscopy  10/24/2028    Flu vaccine  Completed    AAA screen  Completed       BP Readings from Last 3 Encounters:   12/20/18 122/70   11/27/18 120/70   11/07/18 138/84          (Goal 120/80)    Lab Results   Component Value Date    K 3.8 10/27/2018    CREATININE 1.42 10/27/2018    AST 19 09/22/2018    ALT 41 09/22/2018    HCT 38.9 10/27/2018    LABA1C 6.2 12/20/2018    MICROALBUR 1.3 09/07/2016    GLUCOSE 3+ 11/07/2018    CALCIUM 7.9 10/27/2018      Lab Results   Component Value Date    CHOL 131 09/22/2018    CHOL 144 09/07/2016    TRIG 110 09/22/2018    HDL 36 09/07/2016       Review of Systems:      Review of Systems   Constitutional: Negative for chills, diaphoresis and fever. HENT: Negative for sore throat. Respiratory: Negative for chest tightness, shortness of breath and wheezing. Cardiovascular: Negative for chest pain, palpitations and leg swelling. Gastrointestinal: Negative for abdominal distention, abdominal pain, anal bleeding, blood in stool, diarrhea, nausea, rectal pain and vomiting. Endocrine: Negative for polydipsia, polyphagia and polyuria. Genitourinary: Negative for difficulty urinating, dysuria and hematuria. Hematological: Negative for adenopathy. Objective:     /70 (Site: Left Upper Arm, Position: Sitting, Cuff Size: Large Adult)   Pulse 67   Ht 5' 8\" (1.727 m)   Wt 221 lb (100.2 kg)   SpO2 97%   BMI 33.60 kg/m²     Physical Exam   Constitutional: He appears well-developed and well-nourished. No distress. HENT:   Head: Normocephalic and atraumatic. Right Ear: Tympanic membrane normal.   Left Ear: Tympanic membrane normal.   Nose: Nose normal.   Mouth/Throat: No posterior oropharyngeal edema or posterior oropharyngeal erythema. Eyes: No scleral icterus. Neck: Neck supple. Carotid bruit is not present. No thyromegaly present.    Cardiovascular: Regular

## 2018-12-27 ASSESSMENT — ENCOUNTER SYMPTOMS
NAUSEA: 0
BLOOD IN STOOL: 0
ABDOMINAL DISTENTION: 0
VOMITING: 0
CHEST TIGHTNESS: 0
SHORTNESS OF BREATH: 0
WHEEZING: 0
ANAL BLEEDING: 0
RECTAL PAIN: 0
DIARRHEA: 0
SORE THROAT: 0
ABDOMINAL PAIN: 0

## 2019-01-07 ENCOUNTER — TELEPHONE (OUTPATIENT)
Dept: FAMILY MEDICINE CLINIC | Age: 74
End: 2019-01-07

## 2019-01-08 ENCOUNTER — OFFICE VISIT (OUTPATIENT)
Dept: FAMILY MEDICINE CLINIC | Age: 74
End: 2019-01-08
Payer: MEDICARE

## 2019-01-08 VITALS
WEIGHT: 225 LBS | DIASTOLIC BLOOD PRESSURE: 80 MMHG | OXYGEN SATURATION: 95 % | HEART RATE: 64 BPM | SYSTOLIC BLOOD PRESSURE: 114 MMHG | BODY MASS INDEX: 34.21 KG/M2

## 2019-01-08 DIAGNOSIS — M54.50 RIGHT-SIDED LOW BACK PAIN WITHOUT SCIATICA, UNSPECIFIED CHRONICITY: Primary | ICD-10-CM

## 2019-01-08 PROCEDURE — G8482 FLU IMMUNIZE ORDER/ADMIN: HCPCS | Performed by: FAMILY MEDICINE

## 2019-01-08 PROCEDURE — 4040F PNEUMOC VAC/ADMIN/RCVD: CPT | Performed by: FAMILY MEDICINE

## 2019-01-08 PROCEDURE — 1036F TOBACCO NON-USER: CPT | Performed by: FAMILY MEDICINE

## 2019-01-08 PROCEDURE — 3017F COLORECTAL CA SCREEN DOC REV: CPT | Performed by: FAMILY MEDICINE

## 2019-01-08 PROCEDURE — 1101F PT FALLS ASSESS-DOCD LE1/YR: CPT | Performed by: FAMILY MEDICINE

## 2019-01-08 PROCEDURE — G8427 DOCREV CUR MEDS BY ELIG CLIN: HCPCS | Performed by: FAMILY MEDICINE

## 2019-01-08 PROCEDURE — 1123F ACP DISCUSS/DSCN MKR DOCD: CPT | Performed by: FAMILY MEDICINE

## 2019-01-08 PROCEDURE — G8417 CALC BMI ABV UP PARAM F/U: HCPCS | Performed by: FAMILY MEDICINE

## 2019-01-08 PROCEDURE — 99213 OFFICE O/P EST LOW 20 MIN: CPT | Performed by: FAMILY MEDICINE

## 2019-01-08 RX ORDER — HYDROCODONE BITARTRATE AND ACETAMINOPHEN 5; 325 MG/1; MG/1
1 TABLET ORAL EVERY 6 HOURS PRN
Qty: 12 TABLET | Refills: 0 | Status: SHIPPED | OUTPATIENT
Start: 2019-01-08 | End: 2019-01-11 | Stop reason: SDUPTHER

## 2019-01-08 RX ORDER — IBUPROFEN 800 MG/1
TABLET ORAL
COMMUNITY
Start: 2018-12-17 | End: 2019-01-17 | Stop reason: ALTCHOICE

## 2019-01-08 RX ORDER — METHYLPREDNISOLONE 4 MG/1
TABLET ORAL
Qty: 1 KIT | Refills: 0 | Status: SHIPPED | OUTPATIENT
Start: 2019-01-08 | End: 2019-01-14

## 2019-01-10 ASSESSMENT — ENCOUNTER SYMPTOMS
GASTROINTESTINAL NEGATIVE: 1
RESPIRATORY NEGATIVE: 1
BACK PAIN: 1

## 2019-01-11 DIAGNOSIS — M54.50 RIGHT-SIDED LOW BACK PAIN WITHOUT SCIATICA, UNSPECIFIED CHRONICITY: ICD-10-CM

## 2019-01-15 ENCOUNTER — OFFICE VISIT (OUTPATIENT)
Dept: FAMILY MEDICINE CLINIC | Age: 74
End: 2019-01-15
Payer: MEDICARE

## 2019-01-15 VITALS
WEIGHT: 217 LBS | DIASTOLIC BLOOD PRESSURE: 78 MMHG | BODY MASS INDEX: 32.99 KG/M2 | OXYGEN SATURATION: 97 % | SYSTOLIC BLOOD PRESSURE: 124 MMHG | HEART RATE: 59 BPM

## 2019-01-15 DIAGNOSIS — M54.50 RIGHT-SIDED LOW BACK PAIN WITHOUT SCIATICA, UNSPECIFIED CHRONICITY: Primary | ICD-10-CM

## 2019-01-15 PROCEDURE — 99213 OFFICE O/P EST LOW 20 MIN: CPT | Performed by: FAMILY MEDICINE

## 2019-01-15 PROCEDURE — G8417 CALC BMI ABV UP PARAM F/U: HCPCS | Performed by: FAMILY MEDICINE

## 2019-01-15 PROCEDURE — G8427 DOCREV CUR MEDS BY ELIG CLIN: HCPCS | Performed by: FAMILY MEDICINE

## 2019-01-15 PROCEDURE — 1036F TOBACCO NON-USER: CPT | Performed by: FAMILY MEDICINE

## 2019-01-15 PROCEDURE — 1123F ACP DISCUSS/DSCN MKR DOCD: CPT | Performed by: FAMILY MEDICINE

## 2019-01-15 PROCEDURE — 4040F PNEUMOC VAC/ADMIN/RCVD: CPT | Performed by: FAMILY MEDICINE

## 2019-01-15 PROCEDURE — G8482 FLU IMMUNIZE ORDER/ADMIN: HCPCS | Performed by: FAMILY MEDICINE

## 2019-01-15 PROCEDURE — 3017F COLORECTAL CA SCREEN DOC REV: CPT | Performed by: FAMILY MEDICINE

## 2019-01-15 PROCEDURE — 1101F PT FALLS ASSESS-DOCD LE1/YR: CPT | Performed by: FAMILY MEDICINE

## 2019-01-15 RX ORDER — HYDROCODONE BITARTRATE AND ACETAMINOPHEN 5; 325 MG/1; MG/1
1 TABLET ORAL EVERY 8 HOURS PRN
Qty: 21 TABLET | Refills: 0 | Status: SHIPPED | OUTPATIENT
Start: 2019-01-15 | End: 2019-01-22

## 2019-01-17 ENCOUNTER — OFFICE VISIT (OUTPATIENT)
Dept: PAIN MANAGEMENT | Age: 74
End: 2019-01-17
Payer: MEDICARE

## 2019-01-17 VITALS
DIASTOLIC BLOOD PRESSURE: 70 MMHG | SYSTOLIC BLOOD PRESSURE: 138 MMHG | WEIGHT: 218 LBS | HEIGHT: 67 IN | HEART RATE: 64 BPM | BODY MASS INDEX: 34.21 KG/M2

## 2019-01-17 DIAGNOSIS — M54.50 CHRONIC BILATERAL LOW BACK PAIN WITHOUT SCIATICA: Primary | ICD-10-CM

## 2019-01-17 DIAGNOSIS — G89.29 ENCOUNTER FOR CHRONIC PAIN MANAGEMENT: ICD-10-CM

## 2019-01-17 DIAGNOSIS — G89.29 CHRONIC BILATERAL LOW BACK PAIN WITHOUT SCIATICA: Primary | ICD-10-CM

## 2019-01-17 PROCEDURE — 4040F PNEUMOC VAC/ADMIN/RCVD: CPT | Performed by: NURSE PRACTITIONER

## 2019-01-17 PROCEDURE — 1101F PT FALLS ASSESS-DOCD LE1/YR: CPT | Performed by: NURSE PRACTITIONER

## 2019-01-17 PROCEDURE — 99213 OFFICE O/P EST LOW 20 MIN: CPT | Performed by: NURSE PRACTITIONER

## 2019-01-17 PROCEDURE — 1036F TOBACCO NON-USER: CPT | Performed by: NURSE PRACTITIONER

## 2019-01-17 PROCEDURE — 3017F COLORECTAL CA SCREEN DOC REV: CPT | Performed by: NURSE PRACTITIONER

## 2019-01-17 PROCEDURE — G8427 DOCREV CUR MEDS BY ELIG CLIN: HCPCS | Performed by: NURSE PRACTITIONER

## 2019-01-17 PROCEDURE — G8482 FLU IMMUNIZE ORDER/ADMIN: HCPCS | Performed by: NURSE PRACTITIONER

## 2019-01-17 PROCEDURE — 1123F ACP DISCUSS/DSCN MKR DOCD: CPT | Performed by: NURSE PRACTITIONER

## 2019-01-17 PROCEDURE — G8417 CALC BMI ABV UP PARAM F/U: HCPCS | Performed by: NURSE PRACTITIONER

## 2019-01-17 ASSESSMENT — ENCOUNTER SYMPTOMS
SHORTNESS OF BREATH: 0
CONSTIPATION: 0
BACK PAIN: 1

## 2019-01-20 ASSESSMENT — ENCOUNTER SYMPTOMS
BACK PAIN: 1
RESPIRATORY NEGATIVE: 1
GASTROINTESTINAL NEGATIVE: 1

## 2019-02-12 ENCOUNTER — OFFICE VISIT (OUTPATIENT)
Dept: FAMILY MEDICINE CLINIC | Age: 74
End: 2019-02-12
Payer: MEDICARE

## 2019-02-12 VITALS
BODY MASS INDEX: 34.46 KG/M2 | SYSTOLIC BLOOD PRESSURE: 120 MMHG | WEIGHT: 220 LBS | OXYGEN SATURATION: 96 % | DIASTOLIC BLOOD PRESSURE: 70 MMHG | HEART RATE: 69 BPM

## 2019-02-12 DIAGNOSIS — E11.9 TYPE 2 DIABETES MELLITUS WITHOUT COMPLICATION, UNSPECIFIED WHETHER LONG TERM INSULIN USE (HCC): ICD-10-CM

## 2019-02-12 DIAGNOSIS — M54.5 ACUTE LEFT-SIDED LOW BACK PAIN, WITH SCIATICA PRESENCE UNSPECIFIED: Primary | ICD-10-CM

## 2019-02-12 DIAGNOSIS — E11.8 TYPE 2 DIABETES MELLITUS WITH COMPLICATION, UNSPECIFIED WHETHER LONG TERM INSULIN USE: ICD-10-CM

## 2019-02-12 LAB — HBA1C MFR BLD: 6.2 %

## 2019-02-12 PROCEDURE — G8417 CALC BMI ABV UP PARAM F/U: HCPCS | Performed by: FAMILY MEDICINE

## 2019-02-12 PROCEDURE — 83036 HEMOGLOBIN GLYCOSYLATED A1C: CPT | Performed by: FAMILY MEDICINE

## 2019-02-12 PROCEDURE — 4040F PNEUMOC VAC/ADMIN/RCVD: CPT | Performed by: FAMILY MEDICINE

## 2019-02-12 PROCEDURE — 99213 OFFICE O/P EST LOW 20 MIN: CPT | Performed by: FAMILY MEDICINE

## 2019-02-12 PROCEDURE — 1101F PT FALLS ASSESS-DOCD LE1/YR: CPT | Performed by: FAMILY MEDICINE

## 2019-02-12 PROCEDURE — 1123F ACP DISCUSS/DSCN MKR DOCD: CPT | Performed by: FAMILY MEDICINE

## 2019-02-12 PROCEDURE — G8427 DOCREV CUR MEDS BY ELIG CLIN: HCPCS | Performed by: FAMILY MEDICINE

## 2019-02-12 PROCEDURE — G8482 FLU IMMUNIZE ORDER/ADMIN: HCPCS | Performed by: FAMILY MEDICINE

## 2019-02-12 PROCEDURE — 1036F TOBACCO NON-USER: CPT | Performed by: FAMILY MEDICINE

## 2019-02-12 ASSESSMENT — PATIENT HEALTH QUESTIONNAIRE - PHQ9: DEPRESSION UNABLE TO ASSESS: PT REFUSES

## 2019-02-18 ASSESSMENT — ENCOUNTER SYMPTOMS
RESPIRATORY NEGATIVE: 1
GASTROINTESTINAL NEGATIVE: 1
BACK PAIN: 0

## 2019-03-18 LAB
A/G RATIO: 1.2 RATIO
AGE FOR GFR: 76
ALBUMIN: 3.8 G/DL (ref 3.5–5)
ALK PHOSPHATASE: 64 UNITS/L (ref 38–126)
ALT SERPL-CCNC: 24 UNITS/L (ref 21–72)
ANION GAP SERPL CALCULATED.3IONS-SCNC: 11 MMOL/L
AST SERPL-CCNC: 17 UNITS/L (ref 17–59)
BASOPHILS # BLD: 0.09 THOU/MM3 (ref 0–0.3)
BILIRUB SERPL-MCNC: 0.7 MG/DL (ref 0.2–1.3)
BUN BLDV-MCNC: 16 MG/DL (ref 9–20)
CALCIUM SERPL-MCNC: 9.2 MG/DL (ref 8.4–10.2)
CHLORIDE BLD-SCNC: 110 MMOL/L (ref 98–120)
CHOLESTEROL/HDL RATIO: 3.8 RATIO (ref 0–4.5)
CHOLESTEROL: 136 MG/DL (ref 50–200)
CO2: 29 MMOL/L (ref 22–31)
CREAT SERPL-MCNC: 1.3 MG/DL (ref 0.7–1.3)
DIFFERENTIAL: AUTOMATED DIFF
EGFR BF: 48 ML/MIN/1.73 M2
EGFR BM: 65 ML/MIN/1.73 M2
EGFR WF: 40 ML/MIN/1.73 M2
EGFR WM: 54 ML/MIN/1.73 M2
EOSINOPHIL # BLD: 0.12 THOU/MM3 (ref 0–1.1)
GLOBULIN: 3.3 G/DL
GLUCOSE: 124 MG/DL (ref 75–110)
HCT VFR BLD CALC: 49.2 % (ref 42–52)
HDL, DIRECT: 36 MG/DL (ref 36–68)
HEMOGLOBIN: 15.6 G/DL (ref 13.8–17)
LDL CHOLESTEROL CALCULATED: 67.6 MG/DL (ref 0–160)
LYMPHOCYTES # BLD: 2.17 THOU/MM3 (ref 1–5.5)
MCH RBC QN AUTO: 27.9 PG (ref 28.5–32)
MCHC RBC AUTO-ENTMCNC: 31.6 G/DL (ref 32–37)
MCV RBC AUTO: 88.1 FL (ref 80–94)
MONOCYTES # BLD: 0.62 THOU/MM3 (ref 0.1–1)
NEUTROPHILS: 3.06 THOU/MM3 (ref 2–8.1)
PDW BLD-RTO: 12.8 % (ref 10–15)
PLATELET # BLD: 144 THOU/MM3 (ref 130–400)
PMV BLD AUTO: 7.6 FL (ref 7.4–11)
POTASSIUM SERPL-SCNC: 4.4 MMOL/L (ref 3.6–5)
RBC # BLD: 5.59 M/UL (ref 4.7–6.1)
SODIUM BLD-SCNC: 146 MMOL/L (ref 135–145)
TOTAL PROTEIN: 7.1 G/DL (ref 6.3–8.2)
TRIGL SERPL-MCNC: 162 MG/DL (ref 10–250)
VLDLC SERPL CALC-MCNC: 32 MG/DL (ref 0–40)
WBC # BLD: 6.05 THOU/ML3 (ref 4.8–10)

## 2019-03-19 ENCOUNTER — OFFICE VISIT (OUTPATIENT)
Dept: FAMILY MEDICINE CLINIC | Age: 74
End: 2019-03-19
Payer: MEDICARE

## 2019-03-19 VITALS
BODY MASS INDEX: 34.77 KG/M2 | SYSTOLIC BLOOD PRESSURE: 120 MMHG | DIASTOLIC BLOOD PRESSURE: 80 MMHG | WEIGHT: 222 LBS | OXYGEN SATURATION: 97 % | HEART RATE: 74 BPM

## 2019-03-19 DIAGNOSIS — K62.5 RECTAL BLEEDING: ICD-10-CM

## 2019-03-19 DIAGNOSIS — E11.9 TYPE 2 DIABETES MELLITUS WITHOUT COMPLICATION, WITHOUT LONG-TERM CURRENT USE OF INSULIN (HCC): ICD-10-CM

## 2019-03-19 DIAGNOSIS — M54.50 RIGHT-SIDED LOW BACK PAIN WITHOUT SCIATICA, UNSPECIFIED CHRONICITY: ICD-10-CM

## 2019-03-19 DIAGNOSIS — M54.50 CHRONIC BILATERAL LOW BACK PAIN WITHOUT SCIATICA: ICD-10-CM

## 2019-03-19 DIAGNOSIS — E78.5 HYPERLIPIDEMIA, UNSPECIFIED HYPERLIPIDEMIA TYPE: ICD-10-CM

## 2019-03-19 DIAGNOSIS — I10 ESSENTIAL HYPERTENSION: ICD-10-CM

## 2019-03-19 DIAGNOSIS — E78.2 MIXED HYPERLIPIDEMIA: ICD-10-CM

## 2019-03-19 DIAGNOSIS — J45.30 MILD PERSISTENT ASTHMA WITHOUT COMPLICATION: ICD-10-CM

## 2019-03-19 DIAGNOSIS — G89.29 CHRONIC BILATERAL LOW BACK PAIN WITHOUT SCIATICA: ICD-10-CM

## 2019-03-19 DIAGNOSIS — R31.0 GROSS HEMATURIA: ICD-10-CM

## 2019-03-19 DIAGNOSIS — K21.9 GASTROESOPHAGEAL REFLUX DISEASE WITHOUT ESOPHAGITIS: ICD-10-CM

## 2019-03-19 DIAGNOSIS — C61 PROSTATE CANCER (HCC): ICD-10-CM

## 2019-03-19 DIAGNOSIS — E11.9 TYPE 2 DIABETES MELLITUS WITHOUT COMPLICATION, UNSPECIFIED WHETHER LONG TERM INSULIN USE (HCC): Primary | ICD-10-CM

## 2019-03-19 LAB — HBA1C MFR BLD: 6.3 %

## 2019-03-19 PROCEDURE — G8482 FLU IMMUNIZE ORDER/ADMIN: HCPCS | Performed by: FAMILY MEDICINE

## 2019-03-19 PROCEDURE — 4040F PNEUMOC VAC/ADMIN/RCVD: CPT | Performed by: FAMILY MEDICINE

## 2019-03-19 PROCEDURE — 83036 HEMOGLOBIN GLYCOSYLATED A1C: CPT | Performed by: FAMILY MEDICINE

## 2019-03-19 PROCEDURE — 1036F TOBACCO NON-USER: CPT | Performed by: FAMILY MEDICINE

## 2019-03-19 PROCEDURE — G8417 CALC BMI ABV UP PARAM F/U: HCPCS | Performed by: FAMILY MEDICINE

## 2019-03-19 PROCEDURE — G8427 DOCREV CUR MEDS BY ELIG CLIN: HCPCS | Performed by: FAMILY MEDICINE

## 2019-03-19 PROCEDURE — 1123F ACP DISCUSS/DSCN MKR DOCD: CPT | Performed by: FAMILY MEDICINE

## 2019-03-19 PROCEDURE — 1101F PT FALLS ASSESS-DOCD LE1/YR: CPT | Performed by: FAMILY MEDICINE

## 2019-03-19 PROCEDURE — 99214 OFFICE O/P EST MOD 30 MIN: CPT | Performed by: FAMILY MEDICINE

## 2019-03-19 RX ORDER — SIMVASTATIN 40 MG
TABLET ORAL
Qty: 90 TABLET | Refills: 3 | Status: SHIPPED | OUTPATIENT
Start: 2019-03-19 | End: 2020-02-04

## 2019-03-19 RX ORDER — LISINOPRIL 20 MG/1
20 TABLET ORAL DAILY
Qty: 90 TABLET | Refills: 3 | Status: SHIPPED | OUTPATIENT
Start: 2019-03-19 | End: 2020-02-04

## 2019-03-19 ASSESSMENT — PATIENT HEALTH QUESTIONNAIRE - PHQ9: DEPRESSION UNABLE TO ASSESS: PT REFUSES

## 2019-03-20 ASSESSMENT — ENCOUNTER SYMPTOMS
ABDOMINAL PAIN: 0
COUGH: 0
BACK PAIN: 1
BLOOD IN STOOL: 0
SHORTNESS OF BREATH: 0
WHEEZING: 0

## 2019-03-28 ENCOUNTER — TELEPHONE (OUTPATIENT)
Dept: FAMILY MEDICINE CLINIC | Age: 74
End: 2019-03-28

## 2019-03-28 DIAGNOSIS — E11.9 TYPE 2 DIABETES MELLITUS WITHOUT COMPLICATION, UNSPECIFIED WHETHER LONG TERM INSULIN USE (HCC): Primary | ICD-10-CM

## 2019-03-28 NOTE — TELEPHONE ENCOUNTER
Pt called and said his Moe Chicas is now costing him $540 and wondering if there is a cheaper alternative

## 2019-04-01 NOTE — TELEPHONE ENCOUNTER
I sent in a prescription for onglyza ; different medication in the same class ; he should call [facility] and see what the cost will be ;

## 2019-04-02 ENCOUNTER — TELEPHONE (OUTPATIENT)
Dept: FAMILY MEDICINE CLINIC | Age: 74
End: 2019-04-02

## 2019-04-02 NOTE — TELEPHONE ENCOUNTER
Called stating that the new medication you just sent in is still $$$$ costly will just stick with what he already has

## 2019-06-03 DIAGNOSIS — K21.9 GASTROESOPHAGEAL REFLUX DISEASE WITHOUT ESOPHAGITIS: ICD-10-CM

## 2019-06-03 RX ORDER — ESOMEPRAZOLE MAGNESIUM 40 MG/1
CAPSULE, DELAYED RELEASE ORAL
Qty: 90 CAPSULE | Refills: 0 | Status: SHIPPED | OUTPATIENT
Start: 2019-06-03 | End: 2019-08-12 | Stop reason: SDUPTHER

## 2019-06-03 NOTE — TELEPHONE ENCOUNTER
Brayden Gomez is calling to request a refill on the following medication(s):  Requested Prescriptions     Pending Prescriptions Disp Refills    esomeprazole (NEXIUM) 40 MG delayed release capsule 90 capsule 0     Sig: TAKE 1 CAPSULE EVERY MORNING BEFORE BREAKFAST       Last Visit Date (If Applicable):  8/11/5618    Next Visit Date:    9/19/2019

## 2019-06-24 ENCOUNTER — TELEPHONE (OUTPATIENT)
Dept: FAMILY MEDICINE CLINIC | Age: 74
End: 2019-06-24

## 2019-06-24 NOTE — TELEPHONE ENCOUNTER
Pt called stating he is still having issues with his back. Says his back issues are worsening and has tried PT and would like to be referred to a doctor who can fix his back problems for good.

## 2019-07-02 ENCOUNTER — OFFICE VISIT (OUTPATIENT)
Dept: PAIN MANAGEMENT | Age: 74
End: 2019-07-02
Payer: MEDICARE

## 2019-07-02 VITALS
SYSTOLIC BLOOD PRESSURE: 134 MMHG | BODY MASS INDEX: 41.41 KG/M2 | WEIGHT: 225 LBS | RESPIRATION RATE: 16 BRPM | HEIGHT: 62 IN | DIASTOLIC BLOOD PRESSURE: 72 MMHG | HEART RATE: 68 BPM

## 2019-07-02 DIAGNOSIS — M54.06 PANNICULITIS INVOLVING LUMBAR REGION: Primary | ICD-10-CM

## 2019-07-02 DIAGNOSIS — M47.816 LUMBAR FACET JOINT SYNDROME: ICD-10-CM

## 2019-07-02 PROCEDURE — 1123F ACP DISCUSS/DSCN MKR DOCD: CPT | Performed by: NURSE PRACTITIONER

## 2019-07-02 PROCEDURE — G8417 CALC BMI ABV UP PARAM F/U: HCPCS | Performed by: NURSE PRACTITIONER

## 2019-07-02 PROCEDURE — G8427 DOCREV CUR MEDS BY ELIG CLIN: HCPCS | Performed by: NURSE PRACTITIONER

## 2019-07-02 PROCEDURE — 1036F TOBACCO NON-USER: CPT | Performed by: NURSE PRACTITIONER

## 2019-07-02 PROCEDURE — 99214 OFFICE O/P EST MOD 30 MIN: CPT | Performed by: NURSE PRACTITIONER

## 2019-07-02 PROCEDURE — 4040F PNEUMOC VAC/ADMIN/RCVD: CPT | Performed by: NURSE PRACTITIONER

## 2019-07-02 ASSESSMENT — ENCOUNTER SYMPTOMS
BACK PAIN: 1
SHORTNESS OF BREATH: 0
CONSTIPATION: 0

## 2019-07-02 NOTE — PROGRESS NOTES
Subjective:      Patient ID: Collin Fernandez is a 66 y.o. male. Chief Complaint   Patient presents with    Back Pain     lower, left side mainly     Other     Physical Therapy helped before weather changed, when got warmer pain increased with activity     Back Pain   Pertinent negatives include no chest pain, numbness or weakness. Other   Associated symptoms include arthralgias and myalgias. Pertinent negatives include no chest pain, fatigue, numbness or weakness. Returns due to increased low back pain, increased due to increased activity Yardwork, mowing. Left side is worse than right, very minimal pain on the right. Walking can aggravate pain. No radicular symptoms. Pain Assessment  Location of Pain: Back  Location Modifiers: Left, Inferior  Severity of Pain: 3  Quality of Pain: Sharp  Duration of Pain: Persistent  Frequency of Pain: Constant  Aggravating Factors: Bending, Stretching, Straightening, Exercise, Kneeling, Squatting, Standing, Walking  Limiting Behavior: Yes  Relieving Factors: Rest, Other (Comment)(throw pillow behind back helps)  Result of Injury: No  Work-Related Injury: No    Allergies   Allergen Reactions    Crestor [Rosuvastatin]     Meloxicam        No outpatient medications have been marked as taking for the 7/2/19 encounter (Office Visit) with EDMOND Castillo CNP.        Past Medical History:   Diagnosis Date    GERD (gastroesophageal reflux disease)     Hyperlipidemia     Melanoma ([address]     Rheumatoid arthritis ([address]        Past Surgical History:   Procedure Laterality Date    CARDIAC CATHETERIZATION      CHOLECYSTECTOMY      COLONOSCOPY         Family History   Problem Relation Age of Onset    Diabetes Mother        Social History     Socioeconomic History    Marital status:      Spouse name: None    Number of children: None    Years of education: None    Highest education level: None   Occupational History    None   Social Needs    Financial Cardiovascular: Normal rate. Pulmonary/Chest: Effort normal. No accessory muscle usage. No apnea, no tachypnea and no bradypnea. He is not intubated. No respiratory distress. Musculoskeletal:        Lumbar back: He exhibits pain. Neurological: He is alert and oriented to person, place, and time. No cranial nerve deficit. Skin: Skin is warm, dry and intact. Capillary refill takes less than 2 seconds. He is not diaphoretic. No cyanosis. No pallor. Nails show no clubbing. Psychiatric: He has a normal mood and affect. His speech is normal and behavior is normal. Judgment normal. He is not agitated, not aggressive, not withdrawn and not combative. He does not express impulsivity or inappropriate judgment. Vitals reviewed. Assessment:      1. Panniculitis involving lumbar region    2. Lumbar facet joint syndrome            Plan:    Lumbar MRI reviewed, severe spondylosis. schedule Left L4/L5 and L5/S1 Facet Joint injections    Informed consent has not been obtained for procedure. Mir Felixfish not on blood thinners. Medications to hold have not been reviewed with patient. Blood thinners must be held with permission from your cardiologist or primary care physician. A letter is not required to besent to PCP/Cardiologist regarding holding medications for procedure to decrease bleeding risk.          Camilla Ferrara, EDMOND - CNP

## 2019-07-26 ENCOUNTER — HOSPITAL ENCOUNTER (OUTPATIENT)
Age: 74
Setting detail: OUTPATIENT SURGERY
Discharge: HOME | End: 2019-07-26
Attending: PHYSICAL MEDICINE & REHABILITATION | Admitting: PHYSICAL MEDICINE & REHABILITATION
Payer: MEDICARE

## 2019-07-26 ENCOUNTER — APPOINTMENT (OUTPATIENT)
Dept: GENERAL RADIOLOGY | Age: 74
End: 2019-07-26
Attending: PHYSICAL MEDICINE & REHABILITATION
Payer: MEDICARE

## 2019-07-26 VITALS
DIASTOLIC BLOOD PRESSURE: 69 MMHG | WEIGHT: 225 LBS | SYSTOLIC BLOOD PRESSURE: 170 MMHG | HEIGHT: 67 IN | OXYGEN SATURATION: 96 % | HEART RATE: 54 BPM | BODY MASS INDEX: 35.31 KG/M2 | TEMPERATURE: 98.5 F | RESPIRATION RATE: 18 BRPM

## 2019-07-26 PROBLEM — M47.816 LUMBAR FACET JOINT SYNDROME: Status: ACTIVE | Noted: 2019-07-26

## 2019-07-26 PROBLEM — M54.06 PANNICULITIS AFFECTING REGIONS OF NECK AND BACK, LUMBAR REGION: Status: ACTIVE | Noted: 2019-07-26

## 2019-07-26 PROCEDURE — 64493 INJ PARAVERT F JNT L/S 1 LEV: CPT | Performed by: PHYSICAL MEDICINE & REHABILITATION

## 2019-07-26 PROCEDURE — 6360000002 HC RX W HCPCS: Performed by: PHYSICAL MEDICINE & REHABILITATION

## 2019-07-26 PROCEDURE — 7100000010 HC PHASE II RECOVERY - FIRST 15 MIN: Performed by: PHYSICAL MEDICINE & REHABILITATION

## 2019-07-26 PROCEDURE — 6360000004 HC RX CONTRAST MEDICATION: Performed by: PHYSICAL MEDICINE & REHABILITATION

## 2019-07-26 PROCEDURE — 2500000003 HC RX 250 WO HCPCS: Performed by: PHYSICAL MEDICINE & REHABILITATION

## 2019-07-26 PROCEDURE — 3600000056 HC PAIN LEVEL 4 BASE: Performed by: PHYSICAL MEDICINE & REHABILITATION

## 2019-07-26 PROCEDURE — 7100000011 HC PHASE II RECOVERY - ADDTL 15 MIN: Performed by: PHYSICAL MEDICINE & REHABILITATION

## 2019-07-26 PROCEDURE — 2709999900 HC NON-CHARGEABLE SUPPLY: Performed by: PHYSICAL MEDICINE & REHABILITATION

## 2019-07-26 PROCEDURE — 64494 INJ PARAVERT F JNT L/S 2 LEV: CPT | Performed by: PHYSICAL MEDICINE & REHABILITATION

## 2019-07-26 PROCEDURE — 3209999900 FLUORO FOR SURGICAL PROCEDURES

## 2019-07-26 RX ORDER — DEXAMETHASONE SODIUM PHOSPHATE 10 MG/ML
INJECTION INTRAMUSCULAR; INTRAVENOUS PRN
Status: DISCONTINUED | OUTPATIENT
Start: 2019-07-26 | End: 2019-07-26 | Stop reason: ALTCHOICE

## 2019-07-26 ASSESSMENT — PAIN SCALES - GENERAL: PAINLEVEL_OUTOF10: 2

## 2019-07-26 ASSESSMENT — PAIN - FUNCTIONAL ASSESSMENT
PAIN_FUNCTIONAL_ASSESSMENT: ACTIVITIES ARE NOT PREVENTED
PAIN_FUNCTIONAL_ASSESSMENT: 0-10

## 2019-07-26 ASSESSMENT — PAIN DESCRIPTION - DESCRIPTORS
DESCRIPTORS: CONSTANT
DESCRIPTORS: SORE

## 2019-07-26 ASSESSMENT — PAIN DESCRIPTION - PAIN TYPE: TYPE: SURGICAL PAIN

## 2019-07-26 ASSESSMENT — PAIN DESCRIPTION - ORIENTATION: ORIENTATION: LEFT

## 2019-07-26 ASSESSMENT — PAIN DESCRIPTION - LOCATION: LOCATION: BACK

## 2019-07-26 NOTE — OP NOTE
ZYGAPOPHYSEAL JOINT INJECTION    7/26/19    Surgeon: Reza Brooks MD    Pre-operative Diagnosis:   Patient Active Problem List   Diagnosis Code    Type 2 diabetes mellitus without complication (HCC) B77.0    Gastroesophageal reflux disease without esophagitis K21.9    Hyperlipidemia E78.5    Mild persistent asthma without complication L83.55    Elevated PSA R97.20    Male erectile disorder (CODE) F52.21    Melanoma in situ of other site ([address] D03.8    Other specified disorders of brain G93.89    Sepsis ([address] A41.9    Fever R50.9    Chronic bilateral low back pain without sciatica M54.5, G89.29    Encounter for chronic pain management G89.29    Panniculitis affecting regions of neck and back, lumbar region M54.06    Lumbar facet joint syndrome M47.816       Post-operative Diagnosis: Same    INDICATION:  Previous treatment and examination findings are noted in the H&P and previous clinic notes.  Left LL4- 55-S1 facet joint injections have been requested for diagnostic and therapeutic reasons. Conservative treatment was ineffective i.e.: ice, NSAIDS, rest, narcotic medication, chiropractic care, physical therapy and message therapy. Patient is unable to perform the following ADL's: ambulating, grooming, sitting and standing    Pain Assessment: 0-10  Pain Level: 3     Pain Orientation: Left  Pain Location: Back  Pain Descriptors: Constant    Last Plain films: 2019      EXAMINATION:   LL4-5 5S1 facet joint injections with arthrography. CONSENT:  Written consent was obtained from the patient on preprinted consent form after explaining the procedure, indications, potential complications and outcomes.  Alternative treatments were also discussed. DISCUSSION:  The patient was sterilely prepped and draped in the usual fashion in the prone position.  Time out\" was verified for correct patient, side, level and procedure.     SEDATION:  No conscious sedation was performed during the procedure.  The scores. SPINE FLUOROSCOPIC IMAGE INTERPRETATION    EXAMINATION:  AP, Lateral, and Oblique views. FLUORO TIME: 33 seconds    DISCUSSION:  Spot views of the spine reveal normal alignment and segmentation. Spinal needles are positioned in the lumbar facet joint. Contrast outlines the joint space and reveals narrowing. Visualized spine reveals disc space narrowing. Soft tissues reveal no abnormalities.     IMPRESSION: lumbar facet arthrogram shows satisfactory needle placement and contrast dispersal.    Electronically signed by Lilia Almodovar MD on 7/26/2019 at 10:26 AM.

## 2019-07-26 NOTE — H&P
History   Problem Relation Age of Onset    Diabetes Mother              Social History   Social History            Socioeconomic History    Marital status:        Spouse name: None    Number of children: None    Years of education: None    Highest education level: None   Occupational History    None   Social Needs    Financial resource strain: None    Food insecurity:       Worry: None       Inability: None    Transportation needs:       Medical: None       Non-medical: None   Tobacco Use    Smoking status: Former Smoker       Packs/day: 1.00       Years: 45.00       Pack years: 45.00       Last attempt to quit:        Years since quittin.5    Smokeless tobacco: Never Used    Tobacco comment: loc dover,10/26/2018   Substance and Sexual Activity    Alcohol use: Yes       Alcohol/week: 0.6 oz       Types: 1 Cans of beer per week    Drug use: No    Sexual activity: None   Lifestyle    Physical activity:       Days per week: None       Minutes per session: None    Stress: None   Relationships    Social connections:       Talks on phone: None       Gets together: None       Attends Worship service: None       Active member of club or organization: None       Attends meetings of clubs or organizations: None       Relationship status: None    Intimate partner violence:       Fear of current or ex partner: None       Emotionally abused: None       Physically abused: None       Forced sexual activity: None   Other Topics Concern    None   Social History Narrative    None         Review of Systems   Constitutional: Negative for activity change and fatigue. Respiratory: Negative for shortness of breath. Cardiovascular: Negative for chest pain. Gastrointestinal: Negative for constipation. Musculoskeletal: Positive for arthralgias, back pain and myalgias. Negative for gait problem. Skin: Negative. Neurological: Negative for weakness and numbness.    Psychiatric/Behavioral:

## 2019-08-12 DIAGNOSIS — K21.9 GASTROESOPHAGEAL REFLUX DISEASE WITHOUT ESOPHAGITIS: ICD-10-CM

## 2019-08-12 RX ORDER — ESOMEPRAZOLE MAGNESIUM 40 MG/1
CAPSULE, DELAYED RELEASE ORAL
Qty: 90 CAPSULE | Refills: 0 | Status: SHIPPED | OUTPATIENT
Start: 2019-08-12 | End: 2020-06-11 | Stop reason: SDUPTHER

## 2019-08-13 ENCOUNTER — HOSPITAL ENCOUNTER (OUTPATIENT)
Age: 74
Setting detail: OUTPATIENT SURGERY
Discharge: HOME | End: 2019-08-13
Attending: PHYSICAL MEDICINE & REHABILITATION | Admitting: PHYSICAL MEDICINE & REHABILITATION
Payer: MEDICARE

## 2019-08-13 ENCOUNTER — APPOINTMENT (OUTPATIENT)
Dept: GENERAL RADIOLOGY | Age: 74
End: 2019-08-13
Attending: PHYSICAL MEDICINE & REHABILITATION
Payer: MEDICARE

## 2019-08-13 VITALS
DIASTOLIC BLOOD PRESSURE: 79 MMHG | HEART RATE: 56 BPM | RESPIRATION RATE: 16 BRPM | OXYGEN SATURATION: 96 % | HEIGHT: 67 IN | BODY MASS INDEX: 34.84 KG/M2 | SYSTOLIC BLOOD PRESSURE: 180 MMHG | TEMPERATURE: 97.7 F | WEIGHT: 222 LBS

## 2019-08-13 PROCEDURE — 6360000002 HC RX W HCPCS: Performed by: PHYSICAL MEDICINE & REHABILITATION

## 2019-08-13 PROCEDURE — 6360000004 HC RX CONTRAST MEDICATION: Performed by: PHYSICAL MEDICINE & REHABILITATION

## 2019-08-13 PROCEDURE — 64494 INJ PARAVERT F JNT L/S 2 LEV: CPT | Performed by: PHYSICAL MEDICINE & REHABILITATION

## 2019-08-13 PROCEDURE — 3209999900 FLUORO FOR SURGICAL PROCEDURES

## 2019-08-13 PROCEDURE — 7100000010 HC PHASE II RECOVERY - FIRST 15 MIN: Performed by: PHYSICAL MEDICINE & REHABILITATION

## 2019-08-13 PROCEDURE — 2709999900 HC NON-CHARGEABLE SUPPLY: Performed by: PHYSICAL MEDICINE & REHABILITATION

## 2019-08-13 PROCEDURE — 3600000056 HC PAIN LEVEL 4 BASE: Performed by: PHYSICAL MEDICINE & REHABILITATION

## 2019-08-13 PROCEDURE — 64493 INJ PARAVERT F JNT L/S 1 LEV: CPT | Performed by: PHYSICAL MEDICINE & REHABILITATION

## 2019-08-13 PROCEDURE — 2500000003 HC RX 250 WO HCPCS: Performed by: PHYSICAL MEDICINE & REHABILITATION

## 2019-08-13 RX ORDER — DEXAMETHASONE SODIUM PHOSPHATE 10 MG/ML
INJECTION INTRAMUSCULAR; INTRAVENOUS PRN
Status: DISCONTINUED | OUTPATIENT
Start: 2019-08-13 | End: 2019-08-13 | Stop reason: ALTCHOICE

## 2019-08-13 RX ORDER — LIDOCAINE HYDROCHLORIDE 20 MG/ML
INJECTION, SOLUTION EPIDURAL; INFILTRATION; INTRACAUDAL; PERINEURAL PRN
Status: DISCONTINUED | OUTPATIENT
Start: 2019-08-13 | End: 2019-08-13 | Stop reason: ALTCHOICE

## 2019-08-13 ASSESSMENT — PAIN DESCRIPTION - ORIENTATION: ORIENTATION: LEFT

## 2019-08-13 ASSESSMENT — PAIN DESCRIPTION - LOCATION: LOCATION: BACK

## 2019-08-13 ASSESSMENT — PAIN SCALES - GENERAL: PAINLEVEL_OUTOF10: 2

## 2019-08-13 ASSESSMENT — PAIN DESCRIPTION - PAIN TYPE: TYPE: SURGICAL PAIN

## 2019-08-13 ASSESSMENT — PAIN - FUNCTIONAL ASSESSMENT: PAIN_FUNCTIONAL_ASSESSMENT: 0-10

## 2019-08-13 ASSESSMENT — PAIN DESCRIPTION - DESCRIPTORS
DESCRIPTORS: CONSTANT
DESCRIPTORS: DISCOMFORT

## 2019-08-13 NOTE — H&P
Signed        Expand All Collapse All     Show:Clear all  [x]Manual[x]Template[]Copied    Added by:  [x]Win Shea MD    []Addie for details  I have interviewed and examined the patient and reviewed the recent History and Physical.  There have been no changes to the recent H&P documentation. The surgical consent form has been signed. Last anticoagulant medication use was:na     Premedication taken for contrast allergy? No     Valium taken for oral sedation? No     Active Medications          Outpatient Medications Marked as Taking for the 7/26/19 encounter Psychiatric Encounter)   Medication Sig Dispense Refill    esomeprazole (NEXIUM) 40 MG delayed release capsule TAKE 1 CAPSULE EVERY MORNING BEFORE BREAKFAST 90 capsule 0    lisinopril (PRINIVIL;ZESTRIL) 20 MG tablet Take 1 tablet by mouth daily 90 tablet 3    simvastatin (ZOCOR) 40 MG tablet TAKE 1 TABLET DAILY AT BEDTIME 90 tablet 3    PROAIR  (90 Base) MCG/ACT inhaler USE 2 INHALATIONS EVERY 6 HOURS AS NEEDED FOR WHEEZING 25.5 g 3    DULERA 200-5 MCG/ACT inhaler USE 2 INHALATIONS EVERY 12 HOURS 39 g 3    ONE TOUCH ULTRA TEST strip USE TO TEST BLOOD SUGAR TWICE A  each 3    glimepiride (AMARYL) 4 MG tablet TAKE 1 TABLET DAILY 90 tablet 3    metFORMIN, MOD, (GLUMETZA) 500 MG extended release tablet TAKE 2 TABLETS TWICE A  tablet 3    Multiple Vitamins-Minerals (MULTIVITAMIN & MINERAL PO) Take by mouth                The patient understands the planned operation and its associated risks and benefits and agrees to proceed.            Electronically signed by Srikanth Givens MD on 7/26/2019 at 10:23 AM             Srikanth Givens MD   Physician   General Surgery   H&P   Signed   Date of Service:  7/26/2019 10:23 AM          Related encounter: Admission (Discharged) from 7/26/2019 in [address] [place] [address]            Show:Clear all  []Manual[]Template[x]Copied    Added by:  [x]Win Shea MD    []Addie for details Signed          Expand All Collapse All      Show:Clear all  [x]Manual[x]Template[x]Copied     Added by:  [x]EDMOND Delgado CNP     []Addie for details  Subjective:      Patient ID: Sourav Dowell is a 66 y.o. male. Chief Complaint   Patient presents with    Back Pain       lower, left side mainly     Other       Physical Therapy helped before weather changed, when got warmer pain increased with activity      Back Pain   Pertinent negatives include no chest pain, numbness or weakness. Other   Associated symptoms include arthralgias and myalgias. Pertinent negatives include no chest pain, fatigue, numbness or weakness. Returns due to increased low back pain, increased due to increased activity Yardwork, mowing. Left side is worse than right, very minimal pain on the right. Walking can aggravate pain. No radicular symptoms. Pain Assessment  Location of Pain: Back  Location Modifiers: Left, Inferior  Severity of Pain: 3  Quality of Pain: Sharp  Duration of Pain: Persistent  Frequency of Pain: Constant  Aggravating Factors: Bending, Stretching, Straightening, Exercise, Kneeling, Squatting, Standing, Walking  Limiting Behavior: Yes  Relieving Factors: Rest, Other (Comment)(throw pillow behind back helps)  Result of Injury: No  Work-Related Injury: No             Allergies   Allergen Reactions    Crestor [Rosuvastatin]      Meloxicam           Active Medications   No outpatient medications have been marked as taking for the 7/2/19 encounter (Office Visit) with EDMOND Diane CNP.             Past Medical History           Past Medical History:   Diagnosis Date    GERD (gastroesophageal reflux disease)      Hyperlipidemia      [facility] ([facility]      Rheumatoid arthritis ([facility] [facility]              Past Surgical History             Past Surgical History:   Procedure Laterality Date    CARDIAC CATHETERIZATION        CHOLECYSTECTOMY        COLONOSCOPY Family History             Family History   Problem Relation Age of Onset    Diabetes Mother              Social History   Social History                Socioeconomic History    Marital status:        Spouse name: None    Number of children: None    Years of education: None    Highest education level: None   Occupational History    None   Social Needs    Financial resource strain: None    Food insecurity:       Worry: None       Inability: None    Transportation needs:       Medical: None       Non-medical: None   Tobacco Use    Smoking status: Former Smoker       Packs/day: 1.00       Years: 45.00       Pack years: 45.00       Last attempt to quit:        Years since quittin.5    Smokeless tobacco: Never Used    Tobacco comment: loc dover,10/26/2018   Substance and Sexual Activity    Alcohol use: Yes       Alcohol/week: 0.6 oz       Types: 1 Cans of beer per week    Drug use: No    Sexual activity: None   Lifestyle    Physical activity:       Days per week: None       Minutes per session: None    Stress: None   Relationships    Social connections:       Talks on phone: None       Gets together: None       Attends [employer] service: None       Active member of club or organization: None       Attends meetings of clubs or organizations: None       Relationship status: None    Intimate partner violence:       Fear of current or ex partner: None       Emotionally abused: None       Physically abused: None       Forced sexual activity: None   Other Topics Concern    None   Social History Narrative    None         Review of Systems   Constitutional: Negative for activity change and fatigue. Respiratory: Negative for shortness of breath. Cardiovascular: Negative for chest pain. Gastrointestinal: Negative for constipation. Musculoskeletal: Positive for arthralgias, back pain and myalgias. Negative for gait problem. Skin: Negative.     Neurological: Negative for

## 2019-08-13 NOTE — OP NOTE
ZYGAPOPHYSEAL JOINT INJECTION    8/13/19    Surgeon: Cat Johnson MD    Pre-operative Diagnosis:   Patient Active Problem List   Diagnosis Code    Type 2 diabetes mellitus without complication (HCC) S79.3    Gastroesophageal reflux disease without esophagitis K21.9    Hyperlipidemia E78.5    Mild persistent asthma without complication N07.20    Elevated PSA R97.20    Male erectile disorder (CODE) F52.21    Melanoma in situ of other site ([facility] [facility] D03.8    Other specified disorders of brain G93.89    Sepsis ([facility] [facility] A41.9    Fever R50.9    Chronic bilateral low back pain without sciatica M54.5, G89.29    Encounter for chronic pain management G89.29    Panniculitis affecting regions of neck and back, lumbar region M54.06    Lumbar facet joint syndrome M47.816       Post-operative Diagnosis: Same    INDICATION:  Previous treatment and examination findings are noted in the H&P and previous clinic notes.  Left L4-5 5-S1 facet joint injections have been requested for diagnostic and therapeutic reasons. Conservative treatment was ineffective i.e.: ice, NSAIDS, rest, narcotic medication, chiropractic care, physical therapy and message therapy. Patient is unable to perform the following ADL's: ambulating, grooming, sitting and standing    Pain Assessment: 0-10        Pain Orientation: Lower  Pain Location: Back  Pain Descriptors: Constant    Last Plain films: 2019      EXAMINATION:   LL4-5 5-S1 facet joint injections with arthrography. CONSENT:  Written consent was obtained from the patient on preprinted consent form after explaining the procedure, indications, potential complications and outcomes.  Alternative treatments were also discussed. DISCUSSION:  The patient was sterilely prepped and draped in the usual fashion in the prone position.  Time out\" was verified for correct patient, side, level and procedure.     SEDATION:  No conscious sedation was performed during the procedure.  The patient

## 2019-09-19 ENCOUNTER — OFFICE VISIT (OUTPATIENT)
Dept: FAMILY MEDICINE CLINIC | Age: 74
End: 2019-09-19
Payer: MEDICARE

## 2019-09-19 VITALS
SYSTOLIC BLOOD PRESSURE: 134 MMHG | WEIGHT: 228 LBS | OXYGEN SATURATION: 98 % | DIASTOLIC BLOOD PRESSURE: 60 MMHG | BODY MASS INDEX: 35.71 KG/M2 | HEART RATE: 63 BPM

## 2019-09-19 DIAGNOSIS — K21.9 GASTROESOPHAGEAL REFLUX DISEASE WITHOUT ESOPHAGITIS: ICD-10-CM

## 2019-09-19 DIAGNOSIS — Z12.5 SCREENING PSA (PROSTATE SPECIFIC ANTIGEN): ICD-10-CM

## 2019-09-19 DIAGNOSIS — E11.9 TYPE 2 DIABETES MELLITUS WITHOUT COMPLICATION, WITHOUT LONG-TERM CURRENT USE OF INSULIN (HCC): ICD-10-CM

## 2019-09-19 DIAGNOSIS — M47.816 LUMBAR FACET JOINT SYNDROME: ICD-10-CM

## 2019-09-19 DIAGNOSIS — C61 PROSTATE CANCER (HCC): ICD-10-CM

## 2019-09-19 DIAGNOSIS — M06.9 RHEUMATOID ARTHRITIS, INVOLVING UNSPECIFIED SITE, UNSPECIFIED RHEUMATOID FACTOR PRESENCE: ICD-10-CM

## 2019-09-19 DIAGNOSIS — J45.30 MILD PERSISTENT ASTHMA WITHOUT COMPLICATION: Primary | ICD-10-CM

## 2019-09-19 DIAGNOSIS — E78.2 MIXED HYPERLIPIDEMIA: ICD-10-CM

## 2019-09-19 DIAGNOSIS — I10 ESSENTIAL HYPERTENSION: ICD-10-CM

## 2019-09-19 LAB
ADDITIONAL TESTING: NORMAL
ANA SCREEN: NORMAL
HBA1C MFR BLD: 6.7 %
RA TITER: NORMAL

## 2019-09-19 PROCEDURE — 99214 OFFICE O/P EST MOD 30 MIN: CPT | Performed by: FAMILY MEDICINE

## 2019-09-19 PROCEDURE — G8417 CALC BMI ABV UP PARAM F/U: HCPCS | Performed by: FAMILY MEDICINE

## 2019-09-19 PROCEDURE — 4040F PNEUMOC VAC/ADMIN/RCVD: CPT | Performed by: FAMILY MEDICINE

## 2019-09-19 PROCEDURE — 83036 HEMOGLOBIN GLYCOSYLATED A1C: CPT | Performed by: FAMILY MEDICINE

## 2019-09-19 PROCEDURE — G8427 DOCREV CUR MEDS BY ELIG CLIN: HCPCS | Performed by: FAMILY MEDICINE

## 2019-09-19 PROCEDURE — 1123F ACP DISCUSS/DSCN MKR DOCD: CPT | Performed by: FAMILY MEDICINE

## 2019-09-19 PROCEDURE — 1036F TOBACCO NON-USER: CPT | Performed by: FAMILY MEDICINE

## 2019-09-19 ASSESSMENT — ENCOUNTER SYMPTOMS
BACK PAIN: 1
WHEEZING: 0
ABDOMINAL PAIN: 0
SHORTNESS OF BREATH: 0
COUGH: 0
BLOOD IN STOOL: 0

## 2019-09-19 ASSESSMENT — PATIENT HEALTH QUESTIONNAIRE - PHQ9
SUM OF ALL RESPONSES TO PHQ QUESTIONS 1-9: 0
SUM OF ALL RESPONSES TO PHQ QUESTIONS 1-9: 0
SUM OF ALL RESPONSES TO PHQ9 QUESTIONS 1 & 2: 0
1. LITTLE INTEREST OR PLEASURE IN DOING THINGS: 0
2. FEELING DOWN, DEPRESSED OR HOPELESS: 0

## 2019-10-17 DIAGNOSIS — E11.9 TYPE 2 DIABETES MELLITUS WITHOUT COMPLICATION, WITHOUT LONG-TERM CURRENT USE OF INSULIN (HCC): Primary | ICD-10-CM

## 2019-11-26 DIAGNOSIS — E11.9 TYPE 2 DIABETES MELLITUS WITHOUT COMPLICATION, WITHOUT LONG-TERM CURRENT USE OF INSULIN (HCC): Primary | ICD-10-CM

## 2019-12-11 ENCOUNTER — OFFICE VISIT (OUTPATIENT)
Dept: FAMILY MEDICINE CLINIC | Age: 74
End: 2019-12-11
Payer: MEDICARE

## 2019-12-11 VITALS
BODY MASS INDEX: 36.35 KG/M2 | OXYGEN SATURATION: 95 % | HEART RATE: 67 BPM | SYSTOLIC BLOOD PRESSURE: 136 MMHG | WEIGHT: 231.6 LBS | DIASTOLIC BLOOD PRESSURE: 80 MMHG | RESPIRATION RATE: 16 BRPM | HEIGHT: 67 IN | TEMPERATURE: 98.8 F

## 2019-12-11 DIAGNOSIS — J20.9 ACUTE WHEEZY BRONCHITIS: Primary | ICD-10-CM

## 2019-12-11 PROCEDURE — G8417 CALC BMI ABV UP PARAM F/U: HCPCS | Performed by: NURSE PRACTITIONER

## 2019-12-11 PROCEDURE — G8482 FLU IMMUNIZE ORDER/ADMIN: HCPCS | Performed by: NURSE PRACTITIONER

## 2019-12-11 PROCEDURE — 1123F ACP DISCUSS/DSCN MKR DOCD: CPT | Performed by: NURSE PRACTITIONER

## 2019-12-11 PROCEDURE — 99213 OFFICE O/P EST LOW 20 MIN: CPT | Performed by: NURSE PRACTITIONER

## 2019-12-11 PROCEDURE — G8427 DOCREV CUR MEDS BY ELIG CLIN: HCPCS | Performed by: NURSE PRACTITIONER

## 2019-12-11 PROCEDURE — 4040F PNEUMOC VAC/ADMIN/RCVD: CPT | Performed by: NURSE PRACTITIONER

## 2019-12-11 PROCEDURE — 1036F TOBACCO NON-USER: CPT | Performed by: NURSE PRACTITIONER

## 2019-12-11 RX ORDER — PREDNISONE 20 MG/1
20 TABLET ORAL DAILY
Qty: 5 TABLET | Refills: 0 | Status: SHIPPED | OUTPATIENT
Start: 2019-12-11 | End: 2019-12-16

## 2019-12-11 RX ORDER — AZITHROMYCIN 250 MG/1
250 TABLET, FILM COATED ORAL SEE ADMIN INSTRUCTIONS
Qty: 6 TABLET | Refills: 0 | Status: SHIPPED | OUTPATIENT
Start: 2019-12-11 | End: 2019-12-16

## 2019-12-11 RX ORDER — ALBUTEROL SULFATE 90 UG/1
2 AEROSOL, METERED RESPIRATORY (INHALATION) EVERY 4 HOURS PRN
Qty: 1 INHALER | Refills: 0 | Status: SHIPPED | OUTPATIENT
Start: 2019-12-11

## 2019-12-11 ASSESSMENT — ENCOUNTER SYMPTOMS
WHEEZING: 1
COUGH: 1
SHORTNESS OF BREATH: 0

## 2020-02-04 RX ORDER — SIMVASTATIN 40 MG
TABLET ORAL
Qty: 90 TABLET | Refills: 4 | Status: SHIPPED | OUTPATIENT
Start: 2020-02-04 | End: 2021-03-01 | Stop reason: SDUPTHER

## 2020-02-04 RX ORDER — LISINOPRIL 20 MG/1
TABLET ORAL
Qty: 90 TABLET | Refills: 4 | Status: SHIPPED | OUTPATIENT
Start: 2020-02-04 | End: 2021-03-01 | Stop reason: SDUPTHER

## 2020-02-18 NOTE — TELEPHONE ENCOUNTER
Sindi Grace is calling to request a refill on the following medication(s):  Requested Prescriptions     Pending Prescriptions Disp Refills    metFORMIN (GLUCOPHAGE) 1000 MG tablet 180 tablet 1     Sig: Take 1 tablet by mouth 2 times daily (with meals)       Last Visit Date (If Applicable):  3/30/3498    Next Visit Date:    3/26/2020

## 2020-03-13 ENCOUNTER — TELEPHONE (OUTPATIENT)
Dept: FAMILY MEDICINE CLINIC | Age: 75
End: 2020-03-13

## 2020-03-13 NOTE — TELEPHONE ENCOUNTER
He is having severe abdominal pain in the lower right quadrant along with nausea and vomiting. Still has his appendix. Low grade fever. Kidney disease. Recommended he go to the emergency room for further evaluation as I will not be able to access CT services at this time due to insurance companies no longer being open for preauthorization. Patient verbalized understanding.

## 2020-03-26 PROBLEM — I10 ESSENTIAL HYPERTENSION: Status: ACTIVE | Noted: 2020-03-26

## 2020-03-26 PROBLEM — C61 PROSTATE CANCER (HCC): Status: ACTIVE | Noted: 2020-03-26

## 2020-06-09 ENCOUNTER — HOSPITAL ENCOUNTER (OUTPATIENT)
Age: 75
Setting detail: SPECIMEN
Discharge: HOME | End: 2020-06-09
Payer: MEDICARE

## 2020-06-09 LAB
CHOLESTEROL/HDL RATIO: 2.9
CHOLESTEROL: 120 MG/DL
HDLC SERPL-MCNC: 41 MG/DL
LDL CHOLESTEROL: 58 MG/DL (ref 0–130)
TRIGL SERPL-MCNC: 106 MG/DL
VLDLC SERPL CALC-MCNC: NORMAL MG/DL (ref 1–30)

## 2020-06-09 PROCEDURE — 82043 UR ALBUMIN QUANTITATIVE: CPT

## 2020-06-09 PROCEDURE — 36415 COLL VENOUS BLD VENIPUNCTURE: CPT

## 2020-06-09 PROCEDURE — G0103 PSA SCREENING: HCPCS

## 2020-06-09 PROCEDURE — 80061 LIPID PANEL: CPT

## 2020-06-09 PROCEDURE — 86225 DNA ANTIBODY NATIVE: CPT

## 2020-06-09 PROCEDURE — 86235 NUCLEAR ANTIGEN ANTIBODY: CPT

## 2020-06-09 PROCEDURE — 86038 ANTINUCLEAR ANTIBODIES: CPT

## 2020-06-09 PROCEDURE — 82570 ASSAY OF URINE CREATININE: CPT

## 2020-06-10 PROBLEM — C61 PROSTATE CANCER (HCC): Status: RESOLVED | Noted: 2020-03-26 | Resolved: 2020-06-10

## 2020-06-10 PROBLEM — Z85.46 PERSONAL HISTORY OF PROSTATE CANCER: Status: ACTIVE | Noted: 2020-06-10

## 2020-06-10 PROBLEM — A41.9 SEPSIS (HCC): Status: RESOLVED | Noted: 2018-10-26 | Resolved: 2020-06-10

## 2020-06-10 LAB
CREATININE URINE: 143.5 MG/DL (ref 39–259)
MICROALBUMIN/CREAT 24H UR: 13 MG/L
MICROALBUMIN/CREAT UR-RTO: 9 MCG/MG CREAT
PROSTATE SPECIFIC ANTIGEN: 13.86 UG/L

## 2020-06-10 NOTE — PROGRESS NOTES
[address] [facility]  [address] [facility], [address] [facility]  Dept: [phone]  Dept Fax: [phone]    Marco Antonio Duran is a 66 y.o. male who presents today for his medical conditions/complaints as noted below. Marco Antonio Duran c/o of Established New Doctor (previous Renny Muniz)    HPI:   Patient presents to the office to establish care. He previously followed with Dr. Natalie Mireles who recently retired. Has appt with Dr. Judith Santos on Monday for follow up of stent procedure for kidney stone. He also has a history of prostate cancer. Recent PSA completed 2 days ago is elevated at 13.86. Patient denies fever, chills, fatigue, difficulty urinating, decreased stream, or frequency. Marco Antonio Duran is a 66 y.o. male who presents for follow-up of hypertension, diabetes, and hyperlipidemia. He indicates that he is feeling well and denies any symptoms referable to his elevated blood pressure or diabetes. Specifically denies chest pain, palpitations, dyspnea, peripheral edema, thirst, frequent urination, and blurred vision. Current medication regimen is as listed below. He denies any side effects of medication, and has been compliant, taking it regularly. Home glucose readings have been . Checks blood sugars daily. Last eye exam: up to date. Diabetic complications include: none. Monitors BP at home, well controlled. Does not watch salt in diet. Hyperlipidemia:  No new myalgias or GI upset on simvastatin (Zocor). Asthma   There is no chest tightness, cough, difficulty breathing, frequent throat clearing, hoarse voice, shortness of breath, sputum production or wheezing. This is a chronic problem. The current episode started more than 1 year ago. The problem occurs intermittently (usually in the spring, and fall). The problem has been unchanged.  Pertinent negatives include no appetite change, chest pain, dyspnea on exertion, fever, headaches, heartburn, malaise/fatigue, myalgias, nasal congestion, postnasal drip, rhinorrhea or sore throat. His symptoms are aggravated by change in weather. His symptoms are alleviated by steroid inhaler. He reports complete improvement on treatment. His past medical history is significant for asthma. Treatment Adherence:   Medication compliance:  compliant all of the time  Diet compliance:  compliant most of the time  Weight trend: stable  Current exercise: weight training  3-4 time(s) per week with hand weights. He also bowls 3 to 4 days a week. The ASCVD Risk score (Leila Basurto., et al., 2013) failed to calculate for the following reasons:     The valid total cholesterol range is 130 to 320 mg/dL    BP Readings from Last 3 Encounters:   20 136/88   20 132/82   19 136/80              ([id number])    Wt Readings from Last 3 Encounters:   20 211 lb (95.7 kg)   20 207 lb (93.9 kg)   20 229 lb (103.9 kg)       Past Medical History:   Diagnosis Date    GERD (gastroesophageal reflux disease)     Hyperlipidemia     Hypertension     Kidney stone     Melanoma ([address]     Prostate cancer ([address]     Dr Clayton Barry ; saturatio biopsies     Prostate cancer ([address] 3/26/2020    Rheumatoid arthritis ([address]     Sepsis ([address] 10/26/2018    Type 2 diabetes mellitus ([address]       Past Surgical History:   Procedure Laterality Date    CARDIAC CATHETERIZATION      CHOLECYSTECTOMY      COLONOSCOPY  2018    Dr Chantelle Waters ;negative     NERVE SURGERY Left 2019    Left L4/L5 L5/S1 FACET performed by Maryan Muniz MD at [address] [place] [address] Jai 2019    Left L4/L5 L5/S1 FACET performed by Maryan Muniz MD at [place] [address]      Dr Clayton Barry        Family History   Problem Relation Age of Onset    Diabetes Mother        Social History     Tobacco Use    Smoking status: Former Smoker     Packs/day: 1.00     Years: 45.00     Pack years: 45.00     Last attempt to quit:      Years since quittin.4    diaphoresis, fatigue, fever and malaise/fatigue. HENT: Negative. Negative for hoarse voice, postnasal drip, rhinorrhea and sore throat. Eyes: Negative. Respiratory: Negative for cough, sputum production, shortness of breath and wheezing. Cardiovascular: Negative for chest pain, dyspnea on exertion, palpitations and leg swelling. Gastrointestinal: Negative for abdominal pain, constipation, diarrhea and heartburn. Endocrine: Negative for cold intolerance, heat intolerance, polydipsia, polyphagia and polyuria. Genitourinary: Negative for difficulty urinating, dysuria, frequency, hematuria and urgency. Musculoskeletal: Negative for myalgias. Neurological: Negative for dizziness and headaches. Psychiatric/Behavioral: Negative for agitation, decreased concentration and sleep disturbance. The patient is not nervous/anxious. Objective:     /88 (Site: Left Upper Arm, Position: Sitting)   Pulse 64   Wt 211 lb (95.7 kg)   BMI 32.08 kg/m²     Physical Exam  Constitutional:       Appearance: Normal appearance. He is well-developed and well-groomed. HENT:      Head: Normocephalic. Right Ear: Tympanic membrane and ear canal normal.      Left Ear: Tympanic membrane and ear canal normal.      Nose: Nose normal.      Mouth/Throat:      Mouth: Mucous membranes are moist.   Eyes:      Conjunctiva/sclera: Conjunctivae normal.      Pupils: Pupils are equal, round, and reactive to light. Neck:      Musculoskeletal: Neck supple. Thyroid: No thyromegaly. Vascular: No carotid bruit or JVD. Cardiovascular:      Rate and Rhythm: Normal rate and regular rhythm. Heart sounds: Normal heart sounds. Pulmonary:      Effort: Pulmonary effort is normal. No respiratory distress. Breath sounds: Normal breath sounds. No wheezing. Abdominal:      General: Bowel sounds are normal.      Palpations: Abdomen is soft. Tenderness: There is no abdominal tenderness.    Musculoskeletal:

## 2020-06-11 ENCOUNTER — OFFICE VISIT (OUTPATIENT)
Dept: FAMILY MEDICINE CLINIC | Age: 75
End: 2020-06-11
Payer: MEDICARE

## 2020-06-11 VITALS
BODY MASS INDEX: 32.08 KG/M2 | WEIGHT: 211 LBS | HEART RATE: 64 BPM | SYSTOLIC BLOOD PRESSURE: 136 MMHG | DIASTOLIC BLOOD PRESSURE: 88 MMHG

## 2020-06-11 LAB
ANA REFERENCE RANGE:: ABNORMAL
ANTI DNA DOUBLE STRANDED: 59 IU/ML
ANTI JO-1 IGG: 168 U/ML
ANTI RNP AB: 35 U/ML
ANTI SSA: 10 U/ML
ANTI SSB: 11 U/ML
ANTI-CENTROMERE: 41 U/ML
ANTI-NUCLEAR ANTIBODY (ANA): POSITIVE
ANTI-SCLERODERMA: 19 U/ML
ANTI-SMITH: 15 U/ML
HBA1C MFR BLD: 5.7 %
HISTONE ANTIBODY: 108 U/ML

## 2020-06-11 PROCEDURE — 1036F TOBACCO NON-USER: CPT | Performed by: NURSE PRACTITIONER

## 2020-06-11 PROCEDURE — G8417 CALC BMI ABV UP PARAM F/U: HCPCS | Performed by: NURSE PRACTITIONER

## 2020-06-11 PROCEDURE — 1123F ACP DISCUSS/DSCN MKR DOCD: CPT | Performed by: NURSE PRACTITIONER

## 2020-06-11 PROCEDURE — 99201 HC NEW PT, E/M LEVEL 1: CPT

## 2020-06-11 PROCEDURE — G8427 DOCREV CUR MEDS BY ELIG CLIN: HCPCS | Performed by: NURSE PRACTITIONER

## 2020-06-11 PROCEDURE — 99214 OFFICE O/P EST MOD 30 MIN: CPT | Performed by: NURSE PRACTITIONER

## 2020-06-11 PROCEDURE — 83036 HEMOGLOBIN GLYCOSYLATED A1C: CPT | Performed by: NURSE PRACTITIONER

## 2020-06-11 PROCEDURE — 4040F PNEUMOC VAC/ADMIN/RCVD: CPT | Performed by: NURSE PRACTITIONER

## 2020-06-11 RX ORDER — ESOMEPRAZOLE MAGNESIUM 40 MG/1
40 CAPSULE, DELAYED RELEASE ORAL
Qty: 90 CAPSULE | Refills: 2 | Status: SHIPPED | OUTPATIENT
Start: 2020-06-11 | End: 2021-02-01

## 2020-06-11 ASSESSMENT — ENCOUNTER SYMPTOMS
COUGH: 0
ABDOMINAL PAIN: 0
CONSTIPATION: 0
DIARRHEA: 0
SHORTNESS OF BREATH: 0
EYES NEGATIVE: 1
WHEEZING: 0

## 2020-06-12 ENCOUNTER — TELEPHONE (OUTPATIENT)
Dept: FAMILY MEDICINE CLINIC | Age: 75
End: 2020-06-12

## 2020-06-12 PROBLEM — G89.29 CHRONIC LOW BACK PAIN: Status: ACTIVE | Noted: 2019-01-17

## 2020-06-12 PROBLEM — M54.50 CHRONIC LOW BACK PAIN: Status: ACTIVE | Noted: 2019-01-17

## 2020-06-12 ASSESSMENT — ENCOUNTER SYMPTOMS
FREQUENT THROAT CLEARING: 0
HOARSE VOICE: 0
CHEST TIGHTNESS: 0
RHINORRHEA: 0
SPUTUM PRODUCTION: 0
SORE THROAT: 0
HEARTBURN: 0
DIFFICULTY BREATHING: 0

## 2020-06-12 NOTE — TELEPHONE ENCOUNTER
I tried to call office and couldn't get ahold of anyone. Faxed labs, OV and previous notes to Dr Taya Glaser office.

## 2020-06-12 NOTE — TELEPHONE ENCOUNTER
Please fax over recent and prior PSA results, and ALL previous documentation related to prostate to Dr. Markel Strange today. Also, call his office to make sure it is received and inform the nurse patient has appointment on Monday. He is being evaluated for a follow up of kidney stone. Hoping this can also be addressed during his appointment.

## 2020-08-27 NOTE — TELEPHONE ENCOUNTER
Eugene Bustos is calling to request a refill on the following medication(s):  Requested Prescriptions     Pending Prescriptions Disp Refills    metFORMIN (GLUCOPHAGE) 1000 MG tablet 180 tablet 1     Sig: Take 1 tablet by mouth 2 times daily (with meals)       Last Visit Date (If Applicable):  1/72/6216    Next Visit Date:    12/15/2020

## 2020-10-19 RX ORDER — GLUCOSAMINE HCL/CHONDROITIN SU 500-400 MG
1 CAPSULE ORAL 2 TIMES DAILY
Qty: 200 STRIP | Refills: 0 | Status: SHIPPED | OUTPATIENT
Start: 2020-10-19 | End: 2020-10-22 | Stop reason: SDUPTHER

## 2020-10-19 NOTE — TELEPHONE ENCOUNTER
Cintia Sauer is calling to request a refill on the following medication(s):  Requested Prescriptions     Pending Prescriptions Disp Refills    blood glucose monitor strips 200 strip 0     Si strip by Other route 2 times daily Test one to two times a day & as needed for symptoms of irregular blood glucose.  Dispense sufficient amount for indicated testing frequency plus additional to accommodate PRN testing needs for One touch ultra meter       Last Visit Date (If Applicable):      Next Visit Date:    12/15/2020

## 2020-10-22 RX ORDER — GLUCOSAMINE HCL/CHONDROITIN SU 500-400 MG
1 CAPSULE ORAL 2 TIMES DAILY
Qty: 200 STRIP | Refills: 0 | Status: SHIPPED | OUTPATIENT
Start: 2020-10-22

## 2020-10-22 NOTE — TELEPHONE ENCOUNTER
Iglesia Cano is calling to request a refill on the following medication(s):  Requested Prescriptions     Pending Prescriptions Disp Refills    blood glucose monitor strips 200 strip 0     Si strip by Other route 2 times daily Test one to two times a day & as needed for symptoms of irregular blood glucose.  Dispense sufficient amount for indicated testing frequency plus additional to accommodate PRN testing needs for One touch ultra meter       Last Visit Date (If Applicable):      Next Visit Date:    12/15/2020

## 2020-11-09 ENCOUNTER — OFFICE VISIT (OUTPATIENT)
Dept: FAMILY MEDICINE CLINIC | Age: 75
End: 2020-11-09
Payer: MEDICARE

## 2020-11-09 ENCOUNTER — HOSPITAL ENCOUNTER (OUTPATIENT)
Age: 75
Setting detail: SPECIMEN
Discharge: HOME | End: 2020-11-09
Payer: MEDICARE

## 2020-11-09 VITALS
WEIGHT: 222.7 LBS | DIASTOLIC BLOOD PRESSURE: 84 MMHG | HEART RATE: 58 BPM | OXYGEN SATURATION: 94 % | BODY MASS INDEX: 33.86 KG/M2 | SYSTOLIC BLOOD PRESSURE: 136 MMHG

## 2020-11-09 PROBLEM — E11.8 TYPE 2 DIABETES MELLITUS WITH COMPLICATION (HCC): Status: ACTIVE | Noted: 2020-11-09

## 2020-11-09 LAB
ABSOLUTE EOS #: 0.07 K/UL (ref 0–0.44)
ABSOLUTE IMMATURE GRANULOCYTE: <0.03 K/UL (ref 0–0.3)
ABSOLUTE LYMPH #: 1.42 K/UL (ref 1.1–3.7)
ABSOLUTE MONO #: 0.43 K/UL (ref 0.1–1.2)
ALBUMIN SERPL-MCNC: 3.8 G/DL (ref 3.5–5.2)
ALBUMIN/GLOBULIN RATIO: 1.1 (ref 1–2.5)
ALP BLD-CCNC: 61 U/L (ref 40–129)
ALT SERPL-CCNC: 12 U/L (ref 5–41)
ANION GAP SERPL CALCULATED.3IONS-SCNC: 8 MMOL/L (ref 9–17)
AST SERPL-CCNC: 14 U/L
BASOPHILS # BLD: 1 % (ref 0–2)
BASOPHILS ABSOLUTE: 0.05 K/UL (ref 0–0.2)
BILIRUB SERPL-MCNC: 0.67 MG/DL (ref 0.3–1.2)
BUN BLDV-MCNC: 16 MG/DL (ref 8–23)
BUN/CREAT BLD: 13 (ref 9–20)
CALCIUM SERPL-MCNC: 9.1 MG/DL (ref 8.6–10.4)
CHLORIDE BLD-SCNC: 107 MMOL/L (ref 98–107)
CO2: 27 MMOL/L (ref 20–31)
CREAT SERPL-MCNC: 1.23 MG/DL (ref 0.7–1.2)
DIFFERENTIAL TYPE: NORMAL
EOSINOPHILS RELATIVE PERCENT: 2 % (ref 1–4)
ESTIMATED AVERAGE GLUCOSE: 140 MG/DL
GFR AFRICAN AMERICAN: >60 ML/MIN
GFR NON-AFRICAN AMERICAN: 57 ML/MIN
GFR SERPL CREATININE-BSD FRML MDRD: ABNORMAL ML/MIN/{1.73_M2}
GFR SERPL CREATININE-BSD FRML MDRD: ABNORMAL ML/MIN/{1.73_M2}
GLUCOSE BLD-MCNC: 143 MG/DL (ref 70–99)
HBA1C MFR BLD: 6.5 % (ref 4.8–5.9)
HCT VFR BLD CALC: 43.8 % (ref 40.7–50.3)
HEMOGLOBIN: 14.1 G/DL (ref 13–17)
IMMATURE GRANULOCYTES: 0 %
LYMPHOCYTES # BLD: 36 % (ref 24–43)
MCH RBC QN AUTO: 28.2 PG (ref 25.2–33.5)
MCHC RBC AUTO-ENTMCNC: 32.2 G/DL (ref 25.2–33.5)
MCV RBC AUTO: 87.6 FL (ref 82.6–102.9)
MONOCYTES # BLD: 11 % (ref 3–12)
NRBC AUTOMATED: 0 PER 100 WBC
PDW BLD-RTO: 13.4 % (ref 11.8–14.4)
PLATELET # BLD: 179 K/UL (ref 138–453)
PLATELET ESTIMATE: NORMAL
PMV BLD AUTO: 10.6 FL (ref 8.1–13.5)
POTASSIUM SERPL-SCNC: 4.3 MMOL/L (ref 3.7–5.3)
RBC # BLD: 5 M/UL (ref 4.21–5.77)
RBC # BLD: NORMAL 10*6/UL
SEG NEUTROPHILS: 50 % (ref 36–65)
SEGMENTED NEUTROPHILS ABSOLUTE COUNT: 2 K/UL (ref 1.5–8.1)
SODIUM BLD-SCNC: 142 MMOL/L (ref 135–144)
TOTAL PROTEIN: 7.2 G/DL (ref 6.4–8.3)
WBC # BLD: 4 K/UL (ref 3.5–11.3)
WBC # BLD: NORMAL 10*3/UL

## 2020-11-09 PROCEDURE — 93005 ELECTROCARDIOGRAM TRACING: CPT | Performed by: INTERNAL MEDICINE

## 2020-11-09 PROCEDURE — 93010 ELECTROCARDIOGRAM REPORT: CPT | Performed by: INTERNAL MEDICINE

## 2020-11-09 PROCEDURE — 83036 HEMOGLOBIN GLYCOSYLATED A1C: CPT

## 2020-11-09 PROCEDURE — G8482 FLU IMMUNIZE ORDER/ADMIN: HCPCS | Performed by: INTERNAL MEDICINE

## 2020-11-09 PROCEDURE — 80053 COMPREHEN METABOLIC PANEL: CPT

## 2020-11-09 PROCEDURE — 99214 OFFICE O/P EST MOD 30 MIN: CPT | Performed by: INTERNAL MEDICINE

## 2020-11-09 PROCEDURE — 1036F TOBACCO NON-USER: CPT | Performed by: INTERNAL MEDICINE

## 2020-11-09 PROCEDURE — 36415 COLL VENOUS BLD VENIPUNCTURE: CPT

## 2020-11-09 PROCEDURE — G8417 CALC BMI ABV UP PARAM F/U: HCPCS | Performed by: INTERNAL MEDICINE

## 2020-11-09 PROCEDURE — 4040F PNEUMOC VAC/ADMIN/RCVD: CPT | Performed by: INTERNAL MEDICINE

## 2020-11-09 PROCEDURE — G8427 DOCREV CUR MEDS BY ELIG CLIN: HCPCS | Performed by: INTERNAL MEDICINE

## 2020-11-09 PROCEDURE — 85025 COMPLETE CBC W/AUTO DIFF WBC: CPT

## 2020-11-09 PROCEDURE — 1123F ACP DISCUSS/DSCN MKR DOCD: CPT | Performed by: INTERNAL MEDICINE

## 2020-11-09 RX ORDER — BROMFENAC SODIUM 0.7 MG/ML
1 SOLUTION/ DROPS OPHTHALMIC DAILY
COMMUNITY
Start: 2020-11-16 | End: 2020-12-17

## 2020-11-09 RX ORDER — DIAZEPAM 5 MG/1
TABLET ORAL
COMMUNITY
Start: 2020-10-06 | End: 2022-10-25

## 2020-11-09 RX ORDER — DOXYCYCLINE 100 MG/1
100 CAPSULE ORAL DAILY
COMMUNITY
Start: 2020-10-26 | End: 2022-10-25

## 2020-11-09 RX ORDER — GLIMEPIRIDE 4 MG/1
4 TABLET ORAL
Qty: 90 TABLET | Refills: 3 | Status: CANCELLED | OUTPATIENT
Start: 2020-11-09

## 2020-11-09 RX ORDER — LOTEPREDNOL ETABONATE 3.8 MG/G
1 GEL OPHTHALMIC 2 TIMES DAILY
COMMUNITY
Start: 2020-11-19 | End: 2020-12-17

## 2020-11-09 NOTE — PROGRESS NOTES
[address]  [address]  Dept: [phone]  Dept Fax: [phone]: [phone]     Visit Date:  11/9/2020    Patient:  Georgina Shelby  YOB: 1945    HPI:   Georgina Shelby presents today for   Chief Complaint   Patient presents with    Pre-op Exam     pt here for cataract surgery on left eye with Dr Breanna Balderrama   . HPI 68-year-old male with a history of hypertension, diabetes type 2, history of prostate cancer currently being monitored, history of mild persistent asthma, mixed hyperlipidemia, bradycardia with first-degree AV block is coming in for preop testing for his left cataract surgery. Surgery scheduled on the 19th and its going to be likely laser surgery for his cataract in his left eye. Pre-Operative Risk assessment using 2014 ACC/AHA guidelines     Emergent procedure No  Active Cardiac Condition No (decompensated HF, Arrhythmia, MI <3 weeks, severe valve disease)  Risk Level of Procedure Low Risk (endoscopy, superficial skin, breast, ambulatory, or cataract, etc.)  Revised Cardiac Risk Index Risk factors: None  Measurement of Exercise Tolerance before Surgery >4 Yes    According to the 2014 ACC/AHA pre-operative risk assessment guidelines Georgina Shelby is a low risk for major cardiac complications during a intermediate risk procedure and may continue as planned. Specific medication recommendations are listed below. Medications recommended to continue should be taken with a sip of water even when NPO. Further recommendations from consultants: None    Medication Recommendations:  Stop the valium. Medications  Prior to Visit Medications    Medication Sig Taking?  Authorizing Provider   bromfenac (PROLENSA) 0.07 % SOLN Apply 1 drop to eye daily Yes Historical Provider, MD   Loteprednol Etabonate (LOTEMAX SM) 0.38 % GEL Apply 1 drop to eye 2 times daily Yes Historical Provider, MD   doxycycline monohydrate (MONODOX) 100 MG capsule Take 100 mg by mouth daily Yes Historical Provider, MD   diazePAM (VALIUM) 5 MG tablet TAKE 2 TABLETS BY MOUTH IN THE MORNING BEFORE BIOPSY Yes Historical Provider, MD   blood glucose monitor strips 1 strip by Other route 2 times daily Test one to two times a day & as needed for symptoms of irregular blood glucose.  Dispense sufficient amount for indicated testing frequency plus additional to accommodate PRN testing needs for One touch ultra meter Yes EDMOND Donaldson CNP   metFORMIN (GLUCOPHAGE) 1000 MG tablet Take 1 tablet by mouth 2 times daily (with meals) Yes EDMOND Donaldson CNP   esomeprazole (NEXIUM) 40 MG delayed release capsule Take 1 capsule by mouth every morning (before breakfast) Yes EDMOND Donaldson CNP   simvastatin (ZOCOR) 40 MG tablet TAKE 1 TABLET DAILY AT BEDTIME Yes Jayce Gilbert MD   lisinopril (PRINIVIL;ZESTRIL) 20 MG tablet TAKE 1 TABLET DAILY Yes Jayce Gilbert MD   albuterol sulfate HFA (PROAIR HFA) 108 (90 Base) MCG/ACT inhaler Inhale 2 puffs into the lungs every 4 hours as needed for Wheezing or Shortness of Breath (cough) Yes EDMOND Correia NP   PROAIR  (90 Base) MCG/ACT inhaler USE 2 INHALATIONS EVERY 6 HOURS AS NEEDED FOR WHEEZING Yes Jayce Gilbert MD   DULERA 200-5 MCG/ACT inhaler USE 2 INHALATIONS EVERY 12 HOURS Yes Jayce Gilbert MD   ONE TOUCH ULTRA TEST strip USE TO TEST BLOOD SUGAR TWICE A DAY Yes Jayce Gilbert MD   glimepiride (AMARYL) 4 MG tablet TAKE 1 TABLET DAILY Yes Jayce Gilbert MD   Multiple Vitamins-Minerals (MULTIVITAMIN & MINERAL PO) Take by mouth Yes Historical Provider, MD        Allergies:  is allergic to crestor [rosuvastatin] and meloxicam.     Past Medical History:   has a past medical history of GERD (gastroesophageal reflux disease), Hyperlipidemia, Hypertension, Kidney stone, Melanoma ([address] Prostate cancer ([facility] [address] Prostate cancer ([address] Rheumatoid arthritis ([facility] [address] Sepsis ([facility] [address] and Type 2 diabetes mellitus ([facility] [address] Past Surgical History   has a past surgical history that includes Cholecystectomy; Colonoscopy (2018); Cardiac catheterization; Nerve Surgery (Left, 7/26/2019); Nerve Surgery (Left, 8/13/2019); and Prostate biopsy (2018). Family History  family history includes Diabetes in his mother. Social History   reports that he quit smoking about 27 years ago. He has a 45.00 pack-year smoking history. He has never used smokeless tobacco. He reports current alcohol use of about 1.0 standard drinks of alcohol per week. He reports that he does not use drugs. Health Maintenance:    Health Maintenance   Topic Date Due    Annual Wellness Visit (AWV)  05/29/2019    Lipid screen  06/09/2021    PSA counseling  06/09/2021    Potassium monitoring  11/09/2021    Creatinine monitoring  11/09/2021    DTaP/Tdap/Td vaccine (2 - Td) 09/11/2027    Flu vaccine  Completed    Shingles Vaccine  Completed    Pneumococcal 65+ years Vaccine  Completed    Hepatitis A vaccine  Aged Out    Hib vaccine  Aged Out    Meningococcal (ACWY) vaccine  Aged Out       Subjective:      Review of Systems   Constitutional: Negative for fatigue, fever and unexpected weight change. HENT: Negative for ear pain, postnasal drip, rhinorrhea, sinus pain, sore throat and trouble swallowing. Eyes: Negative for visual disturbance. Respiratory: Negative for cough, chest tightness and shortness of breath. Cardiovascular: Negative for chest pain and leg swelling. Gastrointestinal: Negative for abdominal pain, blood in stool and diarrhea. Endocrine: Negative for polyuria. Genitourinary: Negative for difficulty urinating and flank pain. Musculoskeletal: Positive for back pain. Negative for arthralgias, joint swelling and myalgias. Skin: Negative for rash. Allergic/Immunologic: Negative for environmental allergies.    Neurological: Negative for weakness, Joan Powers MD, Cardiology, [place]   2. Essential hypertension  Hemoglobin A1C    CBC With Auto Differential    Comprehensive Metabolic Panel    Seven Willard MD, Cardiology, [place]   3. Type 2 diabetes mellitus with complication (HCC)  Hemoglobin A1C    CBC With Auto Differential    Comprehensive Metabolic Panel    Seven Willard MD, Cardiology, [place]   4. Prostate cancer ([facility] [address]  Hemoglobin A1C    CBC With Auto Differential    Comprehensive Metabolic Panel    Seven Willard MD, Cardiology, [place]   5. Mild persistent asthma without complication  Hemoglobin A1C    CBC With Auto Differential    Comprehensive Metabolic Panel    Nasrin Castelan MD, Cardiology, [place]   6. Mixed hyperlipidemia  Hemoglobin A1C    CBC With Auto Differential    Comprehensive Metabolic Panel    Seven Willard MD, Cardiology, [place]   7. First degree AV block  Seven Willard MD, Cardiology, [place]   8. Bradycardia  Seven Willard MD, Cardiology, [place]         PLAN     Medically clearing him for the cataract surgery recommending closer cardiovascular monitoring during the procedure given his asymptomatic bradycardia. We will obtain his labs today including ensuring and checking his kidney function test/likely has baseline CKD stage II.      EKG patient was noted to have first-degree AV block with a heart rate of 53. He is completely asymptomatic he denies any lightheadedness dizziness presyncopal or syncopal episodes. And his prior EKGs it was noted that he had a first-degree AV block in the past as well. I would highly recommend that he should be seen and evaluated through cardiology. He reports maybe one episode of where he felt woozy a little bit checked his BS and it was fine. Definitely recommend cardiology input.      Also stopping his sulfonylurea/glimepiride as he has been having lots of episodes of low blood sugars going down to 40s and 50s and his average blood sugars mostly fasting in the morning is less than 100. A1c was around 5.7. Orders Placed This Encounter   Procedures    Hemoglobin A1C     Standing Status:   Future     Number of Occurrences:   1     Standing Expiration Date:   11/9/2021    CBC With Auto Differential     Standing Status:   Future     Number of Occurrences:   1     Standing Expiration Date:   11/9/2021    Comprehensive Metabolic Panel     Standing Status:   Future     Number of Occurrences:   1     Standing Expiration Date:   11/9/2021   Ai Prado MD, Cardiology, [place]     Referral Priority:   Routine     Referral Type:   Eval and Treat     Referral Reason:   Specialty Services Required     Referred to Provider:   Layla Crespo MD     Requested Specialty:   Cardiology     Number of Visits Requested:   1    EKG 12 Lead     Order Specific Question:   Reason for Exam?     Answer:   Pre-op        No follow-ups on file. Patient given educational materials - see patient instructions. Discussed use, benefit, and side effects of prescribed medications. All patient questions answered. Pt voiced understanding. Reviewed health maintenance.        Electronically signed Leonor Lopez MD on 11/9/2020 at 10:02 AM EST

## 2020-11-11 ASSESSMENT — ENCOUNTER SYMPTOMS
BACK PAIN: 1
SINUS PAIN: 0
CHEST TIGHTNESS: 0
DIARRHEA: 0
COUGH: 0
SHORTNESS OF BREATH: 0
ABDOMINAL PAIN: 0
RHINORRHEA: 0
BLOOD IN STOOL: 0
TROUBLE SWALLOWING: 0
SORE THROAT: 0

## 2020-12-14 PROBLEM — Z85.46 PERSONAL HISTORY OF PROSTATE CANCER: Status: RESOLVED | Noted: 2020-06-10 | Resolved: 2020-12-14

## 2020-12-14 PROBLEM — G89.29 CHRONIC LOW BACK PAIN: Status: RESOLVED | Noted: 2019-01-17 | Resolved: 2020-12-14

## 2020-12-14 PROBLEM — E11.9 TYPE 2 DIABETES MELLITUS WITHOUT COMPLICATION (HCC): Status: RESOLVED | Noted: 2017-06-05 | Resolved: 2020-12-14

## 2020-12-14 PROBLEM — N13.1 HYDRONEPHROSIS WITH URETERAL STRICTURE, NOT ELSEWHERE CLASSIFIED: Status: ACTIVE | Noted: 2020-12-14

## 2020-12-14 PROBLEM — S05.01XA RIGHT CORNEAL ABRASION: Status: ACTIVE | Noted: 2020-10-26

## 2020-12-14 PROBLEM — M54.50 CHRONIC LOW BACK PAIN: Status: RESOLVED | Noted: 2019-01-17 | Resolved: 2020-12-14

## 2020-12-15 ENCOUNTER — OFFICE VISIT (OUTPATIENT)
Dept: FAMILY MEDICINE CLINIC | Age: 75
End: 2020-12-15
Payer: MEDICARE

## 2020-12-15 VITALS
DIASTOLIC BLOOD PRESSURE: 70 MMHG | HEIGHT: 68 IN | WEIGHT: 212 LBS | BODY MASS INDEX: 32.13 KG/M2 | HEART RATE: 61 BPM | SYSTOLIC BLOOD PRESSURE: 130 MMHG | OXYGEN SATURATION: 96 %

## 2020-12-15 PROBLEM — H25.11 NUCLEAR SCLEROTIC CATARACT OF RIGHT EYE: Status: ACTIVE | Noted: 2020-11-20

## 2020-12-15 PROBLEM — Z98.42: Status: ACTIVE | Noted: 2020-11-20

## 2020-12-15 PROCEDURE — G0438 PPPS, INITIAL VISIT: HCPCS | Performed by: NURSE PRACTITIONER

## 2020-12-15 PROCEDURE — G8482 FLU IMMUNIZE ORDER/ADMIN: HCPCS | Performed by: NURSE PRACTITIONER

## 2020-12-15 PROCEDURE — 4040F PNEUMOC VAC/ADMIN/RCVD: CPT | Performed by: NURSE PRACTITIONER

## 2020-12-15 PROCEDURE — 1123F ACP DISCUSS/DSCN MKR DOCD: CPT | Performed by: NURSE PRACTITIONER

## 2020-12-15 RX ORDER — BICALUTAMIDE 50 MG/1
TABLET, FILM COATED ORAL
COMMUNITY
Start: 2020-11-09 | End: 2021-07-20 | Stop reason: ALTCHOICE

## 2020-12-15 ASSESSMENT — PATIENT HEALTH QUESTIONNAIRE - PHQ9
2. FEELING DOWN, DEPRESSED OR HOPELESS: 0
SUM OF ALL RESPONSES TO PHQ9 QUESTIONS 1 & 2: 0
SUM OF ALL RESPONSES TO PHQ QUESTIONS 1-9: 0
1. LITTLE INTEREST OR PLEASURE IN DOING THINGS: 0
SUM OF ALL RESPONSES TO PHQ QUESTIONS 1-9: 0
SUM OF ALL RESPONSES TO PHQ QUESTIONS 1-9: 0

## 2020-12-15 ASSESSMENT — LIFESTYLE VARIABLES
HOW MANY STANDARD DRINKS CONTAINING ALCOHOL DO YOU HAVE ON A TYPICAL DAY: 0
HOW OFTEN DO YOU HAVE SIX OR MORE DRINKS ON ONE OCCASION: 0
HOW OFTEN DO YOU HAVE A DRINK CONTAINING ALCOHOL: 1
AUDIT-C TOTAL SCORE: 1

## 2020-12-15 NOTE — PROGRESS NOTES
capsule Take 1 capsule by mouth every morning (before breakfast) Yes EDMOND Sherman CNP   simvastatin (ZOCOR) 40 MG tablet TAKE 1 TABLET DAILY AT BEDTIME Yes Cooper Zimmermna MD   lisinopril (PRINIVIL;ZESTRIL) 20 MG tablet TAKE 1 TABLET DAILY Yes Cooper Zimmerman MD   albuterol sulfate HFA (PROAIR HFA) 108 (90 Base) MCG/ACT inhaler Inhale 2 puffs into the lungs every 4 hours as needed for Wheezing or Shortness of Breath (cough) Yes EDMOND Watters NP   PROAIR  (64 Base) MCG/ACT inhaler USE 2 INHALATIONS EVERY 6 HOURS AS NEEDED FOR WHEEZING Yes Cooper Zimmerman MD   DULERA 200-5 MCG/ACT inhaler USE 2 INHALATIONS EVERY 12 HOURS Yes Cooper Zimmerman MD   ONE TOUCH ULTRA TEST strip USE TO TEST BLOOD SUGAR TWICE A DAY Yes Cooper Zimmerman MD   Multiple Vitamins-Minerals (MULTIVITAMIN & MINERAL PO) Take by mouth Yes Historical MD Marcello       Past Medical History:   Diagnosis Date    GERD (gastroesophageal reflux disease)     Hyperlipidemia     Hypertension     Kidney stone     [facility] [address]     Prostate cancer ([facility] [address] 2018    Dr Saud To ; saturatio biopsies     Prostate cancer ([facility] [address] 3/26/2020    Rheumatoid arthritis ([facility] [address]     Sepsis ([facility] [address] 10/26/2018    Type 2 diabetes mellitus ([facility] [address]        Past Surgical History:   Procedure Laterality Date    CARDIAC CATHETERIZATION      CHOLECYSTECTOMY      COLONOSCOPY  2018    Dr Tiki Penny ;negative     NERVE SURGERY Left 7/26/2019    Left L4/L5 L5/S1 FACET performed by Elaine Smith MD at [address] [place] [address] Jai 8/13/2019    Left L4/L5 L5/S1 FACET performed by Elaine Smith MD at [place] [address]  2018    Dr Clarissa Vysa Age of Onset    Diabetes Mother        CareTeam (Including outside providers/suppliers regularly involved in providing care):   Patient Care Team:  EDMOND Sherman CNP as PCP - General (Family Medicine)  EDMOND Sherman CNP as PCP - [facility] Empaneled Provider    Wt Readings from Last 3 Encounters:   12/15/20 212 lb (96.2 kg)   11/09/20 222 lb 11.2 oz (101 kg)   06/11/20 211 lb (95.7 kg)     Vitals:    12/15/20 1409   BP: 130/70   Pulse: 61   SpO2: 96%   Weight: 212 lb (96.2 kg)   Height: 5' 8\" (1.727 m)     Body mass index is 32.23 kg/m². Based upon direct observation of the patient, evaluation of cognition reveals recent and remote memory intact. Physical Exam  Constitutional:       Appearance: Normal appearance. He is well-developed and well-groomed. HENT:      Head: Normocephalic. Nose: Nose normal.      Mouth/Throat:      Mouth: Mucous membranes are moist.   Eyes:      Conjunctiva/sclera: Conjunctivae normal.      Pupils: Pupils are equal, round, and reactive to light. Neck:      Musculoskeletal: Neck supple. Thyroid: No thyromegaly. Vascular: No carotid bruit or JVD. Cardiovascular:      Rate and Rhythm: Normal rate and regular rhythm. Heart sounds: Normal heart sounds. Pulmonary:      Effort: Pulmonary effort is normal. No respiratory distress. Breath sounds: Normal breath sounds. No wheezing. Abdominal:      General: Bowel sounds are normal.      Palpations: Abdomen is soft. Tenderness: There is no abdominal tenderness. Musculoskeletal:      Right lower leg: No edema. Left lower leg: No edema. Lymphadenopathy:      Cervical: No cervical adenopathy. Skin:     Capillary Refill: Capillary refill takes less than 2 seconds. Neurological:      Mental Status: He is alert and oriented to person, place, and time. Psychiatric:         Mood and Affect: Mood normal.         Behavior: Behavior is cooperative.        PHQ Scores 12/15/2020 3/13/2020 9/19/2019 6/14/2018 6/8/2017   PHQ2 Score 0 0 0 0 0   PHQ9 Score 0 0 0 0 0     Interpretation of Total Score Depression Severity: 1-4 = Minimal depression, 5-9 = Mild depression, 10-14 = Moderate depression, 15-19 = Moderately severe depression, 20-27 = Severe depression    Patient's complete Health Risk Assessment and screening values have been reviewed and are found in Flowsheets. The following problems were reviewed today and where indicated follow up appointments were made and/or referrals ordered. Positive Risk Factor Screenings with Interventions:          General Health and ACP:  General  In general, how would you say your health is?: Good  In the past 7 days, have you experienced any of the following? New or Increased Pain, New or Increased Fatigue, Loneliness, Social Isolation, Stress or Anger?: None of These  Do you get the social and emotional support that you need?: Yes  Do you have a Living Will?: Yes  Advance Directives     Power of [facility] Will ACP-Advance Directive ACP-Power of     Not on File Not on File Not on File Not on File      General Health Risk Interventions:  · patient denies needing any additional help in these areas. Health Habits/Nutrition:  Health Habits/Nutrition  Do you exercise for at least 20 minutes 2-3 times per week?: Yes  Have you lost any weight without trying in the past 3 months?: No  Do you eat fewer than 2 meals per day?: No  Have you seen a dentist within the past year?: (!) No  Body mass index: (!) 32.23  Health Habits/Nutrition Interventions:  · Nutritional issues:  educational materials for healthy, well-balanced diet provided  · Dental exam overdue:  patient encouraged to make appointment with his/her dentist, patient declines dental evaluation, patient ststes he has not seen the dentist since [facility].     Hearing/Vision:  No exam data present  Hearing/Vision  Do you or your family notice any trouble with your hearing?: (!) Yes  Do you have difficulty driving, watching TV, or doing any of your daily activities because of your eyesight?: No  Have you had an eye exam within the past year?: Yes  Hearing/Vision Interventions:  · Hearing concerns:  patient declines any further evaluation/treatment for hearing issues    Safety:  Safety  Do you have working smoke detectors?: Yes  Have all throw rugs been removed or fastened?: (!) No  Do you have non-slip mats or surfaces in all bathtubs/showers?: Yes  Do all of your stairways have a railing or banister?: Yes  Are your doorways, halls and stairs free of clutter?: Yes  Do you always fasten your seatbelt when you are in a car?: Yes  Safety Interventions:  · Home safety tips provided  · Patient declines any further evaluation/treatment for this issue     Personalized Preventive Plan   Current Health Maintenance Status  Immunization History   Administered Date(s) Administered    Influenza A ([id number]) Vaccine PF IM 12/21/2009    Influenza, High Dose (Fluzone 65 yrs and older) 09/04/2014, 10/09/2015, 09/19/2016, 08/08/2017, 08/28/2018, 09/16/2019, 08/26/2020    Influenza, Quadv, IM, PF (6 mo and older Fluzone, Flulaval, Fluarix, and 3 yrs and older Afluria) 08/26/2020    Influenza, Quadv, adjuvanted, 65 yrs +, IM, PF (Fluad) 08/26/2020    Influenza, Triv, inactivated, subunit, adjuvanted, IM (Fluad 65 yrs and older) 09/16/2019    Pneumococcal Conjugate 13-valent ([id number]) 04/16/2015, 09/11/2017    Pneumococcal Polysaccharide ([id number]) 03/09/2017    Tdap (Boostrix, Adacel) 09/11/2017    Zoster Live (Zostavax) 10/21/2016    Zoster Recombinant (Shingrix) 02/27/2020, 05/20/2020        Health Maintenance   Topic Date Due    Annual Wellness Visit (AWV)  05/29/2019    Lipid screen  06/09/2021    PSA counseling  06/09/2021    Potassium monitoring  11/09/2021    Creatinine monitoring  11/09/2021    DTaP/Tdap/Td vaccine (2 - Td) 09/11/2027    Flu vaccine  Completed    Shingles Vaccine  Completed    Pneumococcal 65+ years Vaccine  Completed    Hepatitis A vaccine  Aged Out    Hib vaccine  Aged Out    Meningococcal (ACWY) vaccine  Aged Out     Recommendations for [place] Due: see orders and patient instructions/AVS.  .   Recommended screening schedule smoked, or not starting the habit  · Making healthy food choices  · Being physically active and gradualy increasing activity levels   · Reduce weight and determine a healthy BMI goal  · Monitor blood pressure and treat if higher than 140/90 mmHg  · Maintain blood total cholesterol levels under 5 mmol/l or 190 mg/dl  · Maintain LDL cholesterol levels under 3.0 mmol/l or 115 mg/dl   · Control blood glucose levels  · Consider taking aspirin (75 mg daily), once blood pressure is controlled   Provided a follow up plan. Time spent (minutes):  2    Electronically signed by EDMOND Wesley CNP on 12/15/2020 at 2:34 PM.

## 2020-12-15 NOTE — PATIENT INSTRUCTIONS
Advance Directives: Care Instructions  Overview  An advance directive is a legal way to state your wishes at the end of your life. It tells your family and your doctor what to do if you can't say what you want. There are two main types of advance directives. You can change them any time your wishes change. Living will. This form tells your family and your doctor your wishes about life support and other treatment. The form is also called a declaration. Medical power of . This form lets you name a person to make treatment decisions for you when you can't speak for yourself. This person is called a health care agent (health care proxy, health care surrogate). The form is also called a durable power of  for health care. If you do not have an advance directive, decisions about your medical care may be made by a family member, or by a doctor or a  who doesn't know you. It may help to think of an advance directive as a gift to the people who care for you. If you have one, they won't have to make tough decisions by themselves. Follow-up care is a key part of your treatment and safety. Be sure to make and go to all appointments, and call your doctor if you are having problems. It's also a good idea to know your test results and keep a list of the medicines you take. What should you include in an advance directive? Many states have a unique advance directive form. (It may ask you to address specific issues.) Or you might use a universal form that's approved by many states. If your form doesn't tell you what to address, it may be hard to know what to include in your advance directive. Use the questions below to help you get started. · Who do you want to make decisions about your medical care if you are not able to? · What life-support measures do you want if you have a serious illness that gets worse over time or can't be cured? · What are you most afraid of that might happen? Problem: Patient Care Overview  Goal: Plan of Care Review  Outcome: Ongoing (interventions implemented as appropriate)   05/09/18 1604   Coping/Psychosocial   Plan Of Care Reviewed With patient;daughter   Patient awake, alert and oriented. Daughter at the bedside. Patient seem much stronger today, but still weak to the left side. Neuro check Q4, hasn't changed.Vital sign have remained stable. Bed alarm remains on, call light in reach, nonskid socks when out of bed.       (Maybe you're afraid of having pain, losing your independence, or being kept alive by machines.)  · Where would you prefer to die? (Your home? A hospital? A nursing home?)  · Do you want to donate your organs when you die? · Do you want certain Adventism practices performed before you die? When should you call for help? Be sure to contact your doctor if you have any questions. Where can you learn more? Go to https://[email or web address].[email or web address] and sign in to your LaunchCyte account. Enter R264 in the orat.io box to learn more about \"Advance Directives: Care Instructions. \"     If you do not have an account, please click on the \"Sign Up Now\" link. Current as of: December 9, 2019               Content Version: 12.6  © 7167-4319 InvenQuery, Incorporated. Care instructions adapted under license by [facility] ([facility]). If you have questions about a medical condition or this instruction, always ask your healthcare professional. [place] [address] any warranty or liability for your use of this information. Learning About Medical Power of   What is a medical power of ? A medical power of , also called a durable power of  for health care, is one type of the legal forms called advance directives. It lets you name the person you want to make treatment decisions for you if you can't speak or decide for yourself. The person you choose is called your health care agent. This person is also called a health care proxy or health care surrogate. A medical power of  may be called something else in your state. How do you choose a health care agent? Choose your health care agent carefully. This person may or may not be a family member. Talk to the person before you make your final decision. Make sure he or she is comfortable with this responsibility. It's a good idea to choose someone who:  · Is at least 25years old.   · Knows you well and understands what makes life meaningful for you. · Understands your Presybeterian and moral values. · Will do what you want, not what he or she wants. · Will be able to make difficult choices at a stressful time. · Will be able to refuse or stop treatment, if that is what you would want, even if you could die. · Will be firm and confident with health professionals if needed. · Will ask questions to get needed information. · Lives near you or agrees to travel to you if needed. Your family may help you make medical decisions while you can still be part of that process. But it's important to choose one person to be your health care agent in case you aren't able to make decisions for yourself. If you don't fill out the legal form and name a health care agent, the decisions your family can make may be limited. A health care agent may be called something else in your state. Who will make decisions for you if you don't have a health care agent? If you don't have a health care agent or a living will, you may not get the care you want. Decisions may be made by family members who disagree about your medical care. Or decisions may be made by a medical professional who doesn't know you well. In some cases, a  makes the decisions. When you name a health care agent, it is very clear who has the power to make health decisions for you. How do you name a health care agent? You name your health care agent on a legal form. This form is usually called a medical power of . Ask your hospital, state bar association, or office on aging where to find these forms. You must sign the form to make it legal. Some states require you to get the form notarized. This means that a person called a  watches you sign the form and then he or she signs the form. Some states also require that two or more witnesses sign the form. Be sure to tell your family members and doctors who your health care agent is.   Where can you learn more? Go to https://chpepiceweb.BotScanner. org and sign in to your Thrill account. Enter [id number] in the [facility] box to learn more about \"Learning About [address]. \"     If you do not have an account, please click on the \"Sign Up Now\" link. Current as of: December 9, 2019               Content Version: 12.6  © 9543-3236 KnexxLocal. Care instructions adapted under license by [facility] ([facility]). If you have questions about a medical condition or this instruction, always ask your healthcare professional. [address] any warranty or liability for your use of this information. Learning About Living [place]  What is a living will? A living will, also called a declaration, is a legal form. It tells your family and your doctor your wishes when you can't speak for yourself. It's used by the health professionals who will treat you as you near the end of your life or if you get seriously hurt or ill. If you put your wishes in writing, your loved ones and others will know what kind of care you want. They won't need to guess. This can ease your mind and be helpful to others. And you can change or cancel your living will at any time. A living will is not the same as an estate or property will. An estate will explains what you want to happen with your money and property after you die. How do you use it? A living will is used to describe the kinds of treatment or life support you want as you near the end of your life or if you get seriously hurt or ill. Keep these facts in mind about living pyle. · Your living will is used only if you can't speak or make decisions for yourself. Most often, one or more doctors must certify that you can't speak or decide for yourself before your living will takes effect. · If you get better and can speak for yourself again, you can accept or refuse any treatment.  It doesn't matter what you said in your living will. · Some states may limit your right to refuse treatment in certain cases. For example, you may need to clearly state in your living will that you don't want artificial hydration and nutrition, such as being fed through a tube. Is a living will a legal document? A living will is a legal document. Each state has its own laws about living pyle. And a living will may be called something else in your state. Here are some things to know about living pyle. · You don't need an  to complete a living will. But legal advice can be helpful if your state's laws are unclear. It can also help if your health history is complicated or your family can't agree on what should be in your living will. · You can change your living will at any time. Some people find that their wishes about end-of-life care change as their health changes. If you make big changes to your living will, complete a new form. · If you move to another state, make sure that your living will is legal in the state where you now live. In most cases, doctors will respect your wishes even if you have a form from a different state. · You might use a universal form that has been approved by many states. This kind of form can sometimes be filled out and stored online. Your digital copy will then be available wherever you have a connection to the internet. The doctors and nurses who need to treat you can find it right away. · Your state may offer an online registry. This is another place where you can store your living will online. · It's a good idea to get your living will notarized. This means using a person called a  to watch two people sign, or witness, your living will. What should you know when you create a living will? Here are some questions to ask yourself as you make your living will:  · Do you know enough about life support methods that might be used?  If not, talk to your doctor so you know what might be done if you can't breathe on your own, your heart stops, or you can't swallow. · What things would you still want to be able to do after you receive life-support methods? Would you want to be able to walk? To speak? To eat on your own? To live without the help of machines? · Do you want certain Spiritism practices performed if you become very ill? · If you have a choice, where do you want to be cared for? In your home? At a hospital or nursing home? · If you have a choice at the end of your life, where would you prefer to die? At home? In a hospital or nursing home? Somewhere else? · Would you prefer to be buried or cremated? · Do you want your organs to be donated after you die? What should you do with your living will? · Make sure that your family members and your health care agent have copies of your living will (also called a declaration). · Give your doctor a copy of your living will. Ask him or her to keep it as part of your medical record. If you have more than one doctor, make sure that each one has a copy. · Put a copy of your living will where it can be easily found. For example, some people may put a copy on their refrigerator door. If you are using a digital copy, be sure your doctor, family members, and health care agent know how to find and access it. Where can you learn more? Go to https://[email or web address].[email or web address] [email or web address] [email or web address] org and sign in to your Verient account. Enter L363 in the [facility] box to learn more about \"Learning About Living [place]. \"     If you do not have an account, please click on the \"Sign Up Now\" link. Current as of: December 9, 2019               Content Version: 12.6  © 3138-8443 Contrail Systems, Incorporated. Care instructions adapted under license by [facility] ([facility]). If you have questions about a medical condition or this instruction, always ask your healthcare professional. [address] any warranty or liability for your use of this information. butter, ½ ounce nuts or seeds, or ¼ cup of cooked beans equals 1 ounce of meat. · Learn how to read food labels for serving sizes and ingredients. Fast-food and convenience-food meals often contain few or no fruits or vegetables. Make sure you eat some fruits and vegetables to make the meal more nutritious. · Look at your food diary. For each food group, add up what you have eaten and then divide the total by the number of days. This will give you an idea of how much you are eating from each food group. See if you can find some ways to change your diet to make it more healthy. Start small  · Do not try to make dramatic changes to your diet all at once. You might feel that you are missing out on your favorite foods and then be more likely to fail. · Start slowly, and gradually change your habits. Try some of the following:  ? Use whole wheat bread instead of white bread. ? Use nonfat or low-fat milk instead of whole milk. ? Eat brown rice instead of white rice, and eat whole wheat pasta instead of white-flour pasta. ? Try low-fat cheeses and low-fat yogurt. ? Add more fruits and vegetables to meals and have them for snacks. ? Add lettuce, tomato, cucumber, and onion to sandwiches. ? Add fruit to yogurt and cereal.  Enjoy food  · You can still eat your favorite foods. You just may need to eat less of them. If your favorite foods are high in fat, salt, and sugar, limit how often you eat them, but do not cut them out entirely. · Eat a wide variety of foods. Make healthy choices when eating out  · The type of restaurant you choose can help you make healthy choices. Even fast-food chains are now offering more low-fat or healthier choices on the menu. · Choose smaller portions, or take half of your meal home. · When eating out, try:  ? A veggie pizza with a whole wheat crust or grilled chicken (instead of sausage or pepperoni).   ? Pasta with roasted vegetables, grilled chicken, or marinara sauce instead of cream sauce. ? A vegetable wrap or grilled chicken wrap. ? Broiled or poached food instead of fried or breaded items. Make healthy choices easy  · Buy packaged, prewashed, ready-to-eat fresh vegetables and fruits, such as baby carrots, salad mixes, and chopped or shredded broccoli and cauliflower. · Buy packaged, presliced fruits, such as melon or pineapple. · Choose 100% fruit or vegetable juice instead of soda. Limit juice intake to 4 to 6 oz (½ to ¾ cup) a day. · Blend low-fat yogurt, fruit juice, and canned or frozen fruit to make a smoothie for breakfast or a snack. Where can you learn more? Go to https://Jammcard.HexAirbot. org and sign in to your SpiralFrog account. Enter O206 in the CombiMatrix box to learn more about \"Eating Healthy Foods: Care Instructions. \"     If you do not have an account, please click on the \"Sign Up Now\" link. Current as of: August 22, 2019               Content Version: 12.6  © 7514-1185 Fandeavor, [employer]ilities [employer]. Care instructions adapted under license by [facility] ([facility]). If you have questions about a medical condition or this instruction, always ask your healthcare professional. [address] any warranty or liability for your use of this information. Personalized Preventive Plan for Luis Walton - 12/15/2020  Medicare offers a range of preventive health benefits. Some of the tests and screenings are paid in full while other may be subject to a deductible, co-insurance, and/or copay. Some of these benefits include a comprehensive review of your medical history including lifestyle, illnesses that may run in your family, and various assessments and screenings as appropriate. After reviewing your medical record and screening and assessments performed today your provider may have ordered immunizations, labs, imaging, and/or referrals for you.   A list of these orders (if applicable) as well as your Preventive Care list are

## 2021-01-11 LAB
BUN BLDV-MCNC: 20 MG/DL (ref 9–20)
CREAT SERPL-MCNC: 1.2 MG/DL (ref 0.7–1.3)
GFR CALCULATED: > 60

## 2021-01-12 LAB
ALBUMIN: 4.1 G/DL (ref 3.5–5)
ALP BLD-CCNC: 75 UNITS/L (ref 38–126)
ALT SERPL-CCNC: 21 UNITS/L (ref 4–50)
AST SERPL-CCNC: 40 UNITS/L (ref 17–59)
BILIRUB SERPL-MCNC: 0.6 MG/DL (ref 0.2–1.3)
BILIRUBIN DIRECT: 0 MG/DL (ref 0–0.3)
TOTAL PROTEIN, SERUM: 7.8 G/DL (ref 6.3–8.2)

## 2021-02-01 ENCOUNTER — TELEPHONE (OUTPATIENT)
Dept: FAMILY MEDICINE CLINIC | Age: 76
End: 2021-02-01

## 2021-02-01 DIAGNOSIS — K21.9 GASTROESOPHAGEAL REFLUX DISEASE WITHOUT ESOPHAGITIS: ICD-10-CM

## 2021-02-01 DIAGNOSIS — E11.8 TYPE 2 DIABETES MELLITUS WITH COMPLICATION (HCC): Primary | ICD-10-CM

## 2021-02-01 DIAGNOSIS — E11.9 TYPE 2 DIABETES MELLITUS WITHOUT COMPLICATION, WITHOUT LONG-TERM CURRENT USE OF INSULIN (HCC): ICD-10-CM

## 2021-02-01 RX ORDER — ESOMEPRAZOLE MAGNESIUM 40 MG/1
CAPSULE, DELAYED RELEASE ORAL
Qty: 90 CAPSULE | Refills: 3 | Status: SHIPPED | OUTPATIENT
Start: 2021-02-01 | End: 2022-01-28 | Stop reason: SDUPTHER

## 2021-02-01 NOTE — TELEPHONE ENCOUNTER
Carisa Tidwell is calling to request a refill on the following medication(s):  Requested Prescriptions     Pending Prescriptions Disp Refills    esomeprazole ([employer]) 40 MG delayed release capsule [Pharmacy Med Name: ESOMEPRAZOLE MAGNESIUM DR CAPS 40MG] 90 capsule 3     Sig: TAKE 1 CAPSULE EVERY MORNING BEFORE BREAKFAST       Last Visit Date (If Applicable):  51/79/5040    Next Visit Date:    5/4/2021

## 2021-02-01 NOTE — TELEPHONE ENCOUNTER
Pt called and stated his glimepiride was d/c from another provider and has noticed his bs are rising. They have been as high as in the 400's and wondering if he should go back on the glimepiride?

## 2021-02-13 LAB
ALBUMIN: 4 G/DL (ref 3.5–5)
ALP BLD-CCNC: 70 UNITS/L (ref 38–126)
ALT SERPL-CCNC: 12 UNITS/L (ref 4–50)
AST SERPL-CCNC: 15 UNITS/L (ref 17–59)
BILIRUB SERPL-MCNC: 0.6 MG/DL (ref 0.2–1.3)
BILIRUBIN DIRECT: 0 MG/DL (ref 0–0.3)
TOTAL PROTEIN, SERUM: 7 G/DL (ref 6.3–8.2)

## 2021-02-22 DIAGNOSIS — E11.8 TYPE 2 DIABETES MELLITUS WITH COMPLICATION (HCC): ICD-10-CM

## 2021-02-22 NOTE — TELEPHONE ENCOUNTER
Alfa Chino is calling to request a refill on the following medication(s):  Requested Prescriptions     Pending Prescriptions Disp Refills    SITagliptin (JANUVIA) 50 MG tablet 90 tablet 3     Sig: Take 1 tablet by mouth daily       Last Visit Date (If Applicable):  43/50/9959    Next Visit Date:    5/4/2021

## 2021-03-01 DIAGNOSIS — E11.8 TYPE 2 DIABETES MELLITUS WITH COMPLICATION (HCC): ICD-10-CM

## 2021-03-01 DIAGNOSIS — I10 ESSENTIAL HYPERTENSION: ICD-10-CM

## 2021-03-01 DIAGNOSIS — E78.5 HYPERLIPIDEMIA, UNSPECIFIED HYPERLIPIDEMIA TYPE: ICD-10-CM

## 2021-03-01 RX ORDER — SIMVASTATIN 40 MG
40 TABLET ORAL NIGHTLY
Qty: 90 TABLET | Refills: 4 | Status: SHIPPED | OUTPATIENT
Start: 2021-03-01 | End: 2022-01-28 | Stop reason: SDUPTHER

## 2021-03-01 RX ORDER — LISINOPRIL 20 MG/1
20 TABLET ORAL DAILY
Qty: 90 TABLET | Refills: 4 | Status: SHIPPED | OUTPATIENT
Start: 2021-03-01 | End: 2022-01-28 | Stop reason: SDUPTHER

## 2021-03-01 NOTE — TELEPHONE ENCOUNTER
[employer] [facility] is calling to request a refill on the following medication(s):  Requested Prescriptions     Pending Prescriptions Disp Refills    lisinopril (PRINIVIL;ZESTRIL) 20 MG tablet 90 tablet 4     Sig: Take 1 tablet by mouth daily    SITagliptin (JANUVIA) 50 MG tablet 90 tablet 3     Sig: Take 1 tablet by mouth daily    simvastatin (ZOCOR) 40 MG tablet 90 tablet 4     Sig: Take 1 tablet by mouth nightly       Last Visit Date (If Applicable):  80/93/4655    Next Visit Date:    5/4/2021

## 2021-03-08 ENCOUNTER — TELEPHONE (OUTPATIENT)
Dept: FAMILY MEDICINE CLINIC | Age: 76
End: 2021-03-08

## 2021-03-08 NOTE — TELEPHONE ENCOUNTER
Pt called to state he has been diagnosed with Prostate Cancer and is scheduled to have a stress test done on 3/11/21 and wants to know if you still want this completed?

## 2021-03-11 LAB
ALBUMIN: 3.8 G/DL (ref 3.5–5)
ALP BLD-CCNC: 76 UNITS/L (ref 38–126)
ALT SERPL-CCNC: 11 UNITS/L (ref 4–50)
AST SERPL-CCNC: 16 UNITS/L (ref 17–59)
BILIRUB SERPL-MCNC: 0.9 MG/DL (ref 0.2–1.3)
BILIRUBIN DIRECT: 0 MG/DL (ref 0–0.3)
TOTAL PROTEIN, SERUM: 6.9 G/DL (ref 6.3–8.2)

## 2021-03-15 ENCOUNTER — TELEPHONE (OUTPATIENT)
Dept: FAMILY MEDICINE CLINIC | Age: 76
End: 2021-03-15

## 2021-03-15 NOTE — TELEPHONE ENCOUNTER
Pt called to report that he is planning on traveling the 9th of April and he is required to get a covid test 3 days prior and is requesting an order be placed for covid testing

## 2021-03-16 NOTE — TELEPHONE ENCOUNTER
[facility] will do free testing, it must be scheduled online. If he still prefers to have an order placed then we can place the order.

## 2021-03-22 ENCOUNTER — TELEPHONE (OUTPATIENT)
Dept: FAMILY MEDICINE CLINIC | Age: 76
End: 2021-03-22

## 2021-03-22 DIAGNOSIS — Z11.52 ENCOUNTER FOR SCREENING FOR COVID-19: Primary | ICD-10-CM

## 2021-03-22 NOTE — TELEPHONE ENCOUNTER
Pt is planning a trip and per airlines needs tested for covid on the 7th so that he can have proof prior to flight on the 10th of April.  Tried contacting [facility] to schedule but turn around time for results is not within window of travel dates

## 2021-03-22 NOTE — TELEPHONE ENCOUNTER
COVID 19 test ordered as requested. Please schedule appointment at [facility]. We cannot guarantee when the result will be back.

## 2021-04-07 ENCOUNTER — HOSPITAL ENCOUNTER (OUTPATIENT)
Age: 76
Setting detail: SPECIMEN
Discharge: HOME | End: 2021-04-07
Payer: MEDICARE

## 2021-04-07 ENCOUNTER — NURSE ONLY (OUTPATIENT)
Dept: FAMILY MEDICINE CLINIC | Age: 76
End: 2021-04-07
Payer: MEDICARE

## 2021-04-07 DIAGNOSIS — Z20.822 ENCOUNTER FOR LABORATORY TESTING FOR COVID-19 VIRUS: ICD-10-CM

## 2021-04-07 DIAGNOSIS — Z11.52 ENCOUNTER FOR SCREENING FOR COVID-19: ICD-10-CM

## 2021-04-07 PROCEDURE — 99211 OFF/OP EST MAY X REQ PHY/QHP: CPT | Performed by: NURSE PRACTITIONER

## 2021-04-07 PROCEDURE — U0005 INFEC AGEN DETEC AMPLI PROBE: HCPCS

## 2021-04-07 PROCEDURE — U0003 INFECTIOUS AGENT DETECTION BY NUCLEIC ACID (DNA OR RNA); SEVERE ACUTE RESPIRATORY SYNDROME CORONAVIRUS 2 (SARS-COV-2) (CORONAVIRUS DISEASE [COVID-19]), AMPLIFIED PROBE TECHNIQUE, MAKING USE OF HIGH THROUGHPUT TECHNOLOGIES AS DESCRIBED BY CMS-2020-01-R: HCPCS

## 2021-04-08 LAB
SARS-COV-2: NORMAL
SARS-COV-2: NOT DETECTED
SOURCE: NORMAL

## 2021-04-09 ENCOUNTER — TELEPHONE (OUTPATIENT)
Dept: PRIMARY CARE CLINIC | Age: 76
End: 2021-04-09

## 2021-04-20 ENCOUNTER — OFFICE VISIT (OUTPATIENT)
Dept: FAMILY MEDICINE CLINIC | Age: 76
End: 2021-04-20
Payer: MEDICARE

## 2021-04-20 VITALS
DIASTOLIC BLOOD PRESSURE: 86 MMHG | WEIGHT: 208 LBS | SYSTOLIC BLOOD PRESSURE: 130 MMHG | BODY MASS INDEX: 31.63 KG/M2 | HEART RATE: 62 BPM | OXYGEN SATURATION: 97 %

## 2021-04-20 DIAGNOSIS — C61 MALIGNANT NEOPLASM OF PROSTATE (HCC): ICD-10-CM

## 2021-04-20 DIAGNOSIS — I10 ESSENTIAL HYPERTENSION: ICD-10-CM

## 2021-04-20 DIAGNOSIS — G89.29 CHRONIC MIDLINE LOW BACK PAIN WITH RIGHT-SIDED SCIATICA: ICD-10-CM

## 2021-04-20 DIAGNOSIS — K21.9 GASTROESOPHAGEAL REFLUX DISEASE WITHOUT ESOPHAGITIS: ICD-10-CM

## 2021-04-20 DIAGNOSIS — E78.2 MIXED HYPERLIPIDEMIA: ICD-10-CM

## 2021-04-20 DIAGNOSIS — E11.8 TYPE 2 DIABETES MELLITUS WITH COMPLICATION (HCC): Primary | ICD-10-CM

## 2021-04-20 DIAGNOSIS — J45.30 MILD PERSISTENT ASTHMA WITHOUT COMPLICATION: ICD-10-CM

## 2021-04-20 DIAGNOSIS — M25.551 HIP PAIN, ACUTE, RIGHT: ICD-10-CM

## 2021-04-20 DIAGNOSIS — M54.41 CHRONIC MIDLINE LOW BACK PAIN WITH RIGHT-SIDED SCIATICA: ICD-10-CM

## 2021-04-20 DIAGNOSIS — M06.9 RHEUMATOID ARTHRITIS, INVOLVING UNSPECIFIED SITE, UNSPECIFIED WHETHER RHEUMATOID FACTOR PRESENT (HCC): ICD-10-CM

## 2021-04-20 LAB — HBA1C MFR BLD: 7.2 %

## 2021-04-20 PROCEDURE — G8417 CALC BMI ABV UP PARAM F/U: HCPCS | Performed by: NURSE PRACTITIONER

## 2021-04-20 PROCEDURE — 99214 OFFICE O/P EST MOD 30 MIN: CPT

## 2021-04-20 PROCEDURE — 99214 OFFICE O/P EST MOD 30 MIN: CPT | Performed by: NURSE PRACTITIONER

## 2021-04-20 PROCEDURE — 1036F TOBACCO NON-USER: CPT | Performed by: NURSE PRACTITIONER

## 2021-04-20 PROCEDURE — 3051F HG A1C>EQUAL 7.0%<8.0%: CPT | Performed by: NURSE PRACTITIONER

## 2021-04-20 PROCEDURE — 4040F PNEUMOC VAC/ADMIN/RCVD: CPT | Performed by: NURSE PRACTITIONER

## 2021-04-20 PROCEDURE — 1123F ACP DISCUSS/DSCN MKR DOCD: CPT | Performed by: NURSE PRACTITIONER

## 2021-04-20 PROCEDURE — G8427 DOCREV CUR MEDS BY ELIG CLIN: HCPCS | Performed by: NURSE PRACTITIONER

## 2021-04-20 PROCEDURE — 83036 HEMOGLOBIN GLYCOSYLATED A1C: CPT | Performed by: NURSE PRACTITIONER

## 2021-04-20 RX ORDER — TAMSULOSIN HYDROCHLORIDE 0.4 MG/1
CAPSULE ORAL
COMMUNITY
Start: 2021-02-01 | End: 2022-01-28 | Stop reason: SDUPTHER

## 2021-04-20 RX ORDER — METHYLPREDNISOLONE 4 MG/1
TABLET ORAL
Qty: 1 KIT | Refills: 0 | Status: SHIPPED | OUTPATIENT
Start: 2021-04-20 | End: 2021-04-26

## 2021-04-20 ASSESSMENT — PATIENT HEALTH QUESTIONNAIRE - PHQ9
SUM OF ALL RESPONSES TO PHQ QUESTIONS 1-9: 0
SUM OF ALL RESPONSES TO PHQ QUESTIONS 1-9: 0

## 2021-04-25 PROBLEM — M54.41 CHRONIC MIDLINE LOW BACK PAIN WITH RIGHT-SIDED SCIATICA: Status: ACTIVE | Noted: 2019-01-17

## 2021-04-25 PROBLEM — N13.30 HYDRONEPHROSIS: Status: ACTIVE | Noted: 2020-12-14

## 2021-04-25 ASSESSMENT — ENCOUNTER SYMPTOMS
CONSTIPATION: 0
EYES NEGATIVE: 1
BACK PAIN: 1
DIARRHEA: 0
ABDOMINAL PAIN: 0
COUGH: 0
WHEEZING: 0
SHORTNESS OF BREATH: 0
NAUSEA: 0

## 2021-06-02 LAB — DIAGNOSTIC PSA: < 0.06 NG/ML (ref 0–4)

## 2021-07-20 ENCOUNTER — OFFICE VISIT (OUTPATIENT)
Dept: FAMILY MEDICINE CLINIC | Age: 76
End: 2021-07-20
Payer: MEDICARE

## 2021-07-20 VITALS
DIASTOLIC BLOOD PRESSURE: 86 MMHG | OXYGEN SATURATION: 99 % | SYSTOLIC BLOOD PRESSURE: 134 MMHG | BODY MASS INDEX: 32.39 KG/M2 | HEART RATE: 68 BPM | WEIGHT: 213 LBS

## 2021-07-20 DIAGNOSIS — E11.8 TYPE 2 DIABETES MELLITUS WITH COMPLICATION (HCC): Primary | ICD-10-CM

## 2021-07-20 DIAGNOSIS — M06.9 RHEUMATOID ARTHRITIS, INVOLVING UNSPECIFIED SITE, UNSPECIFIED WHETHER RHEUMATOID FACTOR PRESENT (HCC): ICD-10-CM

## 2021-07-20 DIAGNOSIS — Z11.59 SCREENING FOR VIRAL DISEASE: ICD-10-CM

## 2021-07-20 DIAGNOSIS — E78.2 MIXED HYPERLIPIDEMIA: ICD-10-CM

## 2021-07-20 DIAGNOSIS — I10 ESSENTIAL HYPERTENSION: ICD-10-CM

## 2021-07-20 DIAGNOSIS — K21.9 GASTROESOPHAGEAL REFLUX DISEASE WITHOUT ESOPHAGITIS: ICD-10-CM

## 2021-07-20 DIAGNOSIS — Z85.46 PERSONAL HISTORY OF PROSTATE CANCER: ICD-10-CM

## 2021-07-20 DIAGNOSIS — J45.30 MILD PERSISTENT ASTHMA WITHOUT COMPLICATION: ICD-10-CM

## 2021-07-20 LAB — HBA1C MFR BLD: 7.7 %

## 2021-07-20 PROCEDURE — G8427 DOCREV CUR MEDS BY ELIG CLIN: HCPCS | Performed by: NURSE PRACTITIONER

## 2021-07-20 PROCEDURE — 99214 OFFICE O/P EST MOD 30 MIN: CPT | Performed by: NURSE PRACTITIONER

## 2021-07-20 PROCEDURE — 99213 OFFICE O/P EST LOW 20 MIN: CPT

## 2021-07-20 PROCEDURE — 83036 HEMOGLOBIN GLYCOSYLATED A1C: CPT | Performed by: NURSE PRACTITIONER

## 2021-07-20 PROCEDURE — 1123F ACP DISCUSS/DSCN MKR DOCD: CPT | Performed by: NURSE PRACTITIONER

## 2021-07-20 PROCEDURE — 3051F HG A1C>EQUAL 7.0%<8.0%: CPT | Performed by: NURSE PRACTITIONER

## 2021-07-20 PROCEDURE — G8417 CALC BMI ABV UP PARAM F/U: HCPCS | Performed by: NURSE PRACTITIONER

## 2021-07-20 PROCEDURE — 1036F TOBACCO NON-USER: CPT | Performed by: NURSE PRACTITIONER

## 2021-07-20 PROCEDURE — 4040F PNEUMOC VAC/ADMIN/RCVD: CPT | Performed by: NURSE PRACTITIONER

## 2021-07-20 SDOH — ECONOMIC STABILITY: FOOD INSECURITY: WITHIN THE PAST 12 MONTHS, YOU WORRIED THAT YOUR FOOD WOULD RUN OUT BEFORE YOU GOT MONEY TO BUY MORE.: NEVER TRUE

## 2021-07-20 SDOH — ECONOMIC STABILITY: FOOD INSECURITY: WITHIN THE PAST 12 MONTHS, THE FOOD YOU BOUGHT JUST DIDN'T LAST AND YOU DIDN'T HAVE MONEY TO GET MORE.: NEVER TRUE

## 2021-07-20 ASSESSMENT — SOCIAL DETERMINANTS OF HEALTH (SDOH): HOW HARD IS IT FOR YOU TO PAY FOR THE VERY BASICS LIKE FOOD, HOUSING, MEDICAL CARE, AND HEATING?: NOT HARD AT ALL

## 2021-07-20 NOTE — PROGRESS NOTES
[facility]  [address]  Dept: [phone]  Dept Fax: [phone]    Chief Complaint   Patient presents with    3 Month Follow-Up     no issues or complaints    Hypertension    Hyperlipidemia    Gastroesophageal Reflux    Diabetes     on occasion bs check        HPI:      Georgina Shelby is a 64 y.o. male who presents for follow-up of hypertension, diabetes, and hyperlipidemia. He indicates that he is feeling well and denies any symptoms referable to his elevated blood pressure or diabetes. Specifically denies chest pain, palpitations, dyspnea, peripheral edema, thirst, frequent urination, and blurred vision. Current medication regimen is as listed below. He denies any side effects of medication, and has been compliant, taking it regularly. Home glucose readings have been 200-240. Checks blood sugars almost every day. Last eye exam: appt 7/25/2021. Diabetic complications include: none    Hyperlipidemia:  No new myalgias or GI upset on simvastatin (Zocor). Treatment Adherence:   Medication compliance:  compliant most of the time  Diet compliance:  compliant most of the time  Weight trend: stable  Current exercise: no regular exercise  Barriers: lack of motivation    Prostate cancer  Follows regularly with Naseem Espinal injections every 6 months for 2 years. Abnormal EKG  Completed during preop examination patient was referred to cardiology for further evaluation of unspecific T wave abnormality, first-degree AV block, and bradycardia. He was evaluated by Dr. Tucker Roque without any additional testing or medications. Recommended follow-up in 6 months. Patient completed COVID-19 vaccination? Yes     The ASCVD Risk score (Meli Alexander, et al., 2013) failed to calculate for the following reasons:     The valid total cholesterol range is 130 to 320 mg/dL    BP Readings from Last 3 Encounters:   07/20/21 134/86   04/20/21 130/86 12/15/20 130/70          (goal 120/80)    Pulse Readings from Last 3 Encounters:   21 68   21 62   12/15/20 61        Wt Readings from Last 3 Encounters:   21 213 lb (96.6 kg)   21 208 lb (94.3 kg)   12/15/20 212 lb (96.2 kg)       Past Medical History:   Diagnosis Date    GERD (gastroesophageal reflux disease)     Hyperlipidemia     Hypertension     Kidney stone     [facility]     Prostate cancer ([facility]     Dr Ousmane Booth ; saturatio biopsies     Prostate cancer ([facility] 3/26/2020    Rheumatoid arthritis ([facility]     Sepsis ([facility] 10/26/2018    Type 2 diabetes mellitus ([facility]       Past Surgical History:   Procedure Laterality Date    CARDIAC CATHETERIZATION      CHOLECYSTECTOMY      COLONOSCOPY      Dr Sinan Tarango ;negative     NERVE SURGERY Left 2019    Left L4/L5 L5/S1 FACET performed by Nishi Christiansen MD at [address] [place] [address] Left 2019    Left L4/L5 L5/S1 FACET performed by Nishi Christiansen MD at [address] [place] [address]  2018    Dr Ousmane Booth        Family History   Problem Relation Age of Onset    Diabetes Mother        Social History     Tobacco Use    Smoking status: Former Smoker     Packs/day: 1.00     Years: 45.00     Pack years: 45.00     Quit date:      Years since quittin.    Smokeless tobacco: Never Used    Tobacco comment: loc dover,10/26/2018   Substance Use Topics    Alcohol use:  Yes     Alcohol/week: 1.0 standard drinks     Types: 1 Cans of beer per week        Current Outpatient Medications   Medication Sig Dispense Refill    SITagliptin (JANUVIA) 100 MG tablet Take 1 tablet by mouth daily 90 tablet 3    tamsulosin (FLOMAX) 0.4 MG capsule       lisinopril (PRINIVIL;ZESTRIL) 20 MG tablet Take 1 tablet by mouth daily 90 tablet 4    simvastatin (ZOCOR) 40 MG tablet Take 1 tablet by mouth nightly 90 tablet 4    metFORMIN (GLUCOPHAGE) 1000 MG tablet TAKE 1 TABLET TWICE A DAY WITH MEALS 180 tablet 3    esomeprazole (NEXIUM) 40 MG delayed release capsule TAKE 1 CAPSULE EVERY MORNING BEFORE BREAKFAST 90 capsule 3    leuprolide acetate, 6 Month, (ELIGARD) 45 MG injection Inject 45 mg into the skin once      doxycycline monohydrate (MONODOX) 100 MG capsule Take 100 mg by mouth daily      diazePAM (VALIUM) 5 MG tablet TAKE 2 TABLETS BY MOUTH IN THE MORNING BEFORE BIOPSY      blood glucose monitor strips 1 strip by Other route 2 times daily Test one to two times a day & as needed for symptoms of irregular blood glucose. Dispense sufficient amount for indicated testing frequency plus additional to accommodate PRN testing needs for One touch ultra meter 200 strip 0    albuterol sulfate HFA (PROAIR HFA) 108 (90 Base) MCG/ACT inhaler Inhale 2 puffs into the lungs every 4 hours as needed for Wheezing or Shortness of Breath (cough) 1 Inhaler 0    PROAIR  (90 Base) MCG/ACT inhaler USE 2 INHALATIONS EVERY 6 HOURS AS NEEDED FOR WHEEZING 25.5 g 3    DULERA 200-5 MCG/ACT inhaler USE 2 INHALATIONS EVERY 12 HOURS 39 g 3    ONE TOUCH ULTRA TEST strip USE TO TEST BLOOD SUGAR TWICE A  each 3    Multiple Vitamins-Minerals (MULTIVITAMIN & MINERAL PO) Take by mouth       No current facility-administered medications for this visit.      Allergies   Allergen Reactions    Crestor [Rosuvastatin]     Meloxicam        Health Maintenance   Topic Date Due    Hepatitis C screen  Never done    Lipid screen  06/09/2021    Flu vaccine (1) 09/01/2021    Potassium monitoring  11/09/2021    Annual Wellness Visit (AWV)  12/16/2021    Creatinine monitoring  01/11/2022    PSA counseling  06/02/2022    DTaP/Tdap/Td vaccine (2 - Td or Tdap) 09/11/2027    Shingles Vaccine  Completed    Pneumococcal 65+ years Vaccine  Completed    COVID-19 Vaccine  Completed    Hepatitis A vaccine  Aged Out    Hib vaccine  Aged Out    Meningococcal (ACWY) vaccine  Aged Out       Subjective:     Review of Systems   Constitutional: Negative for chills, fatigue and fever.   HENT: Positive for tinnitus (chronic). Eyes: Negative. Respiratory: Negative for cough, shortness of breath and wheezing. Cardiovascular: Negative for chest pain, palpitations and leg swelling. Gastrointestinal: Negative for abdominal pain, constipation and diarrhea. Endocrine: Negative for cold intolerance, heat intolerance, polydipsia, polyphagia and polyuria. Genitourinary: Negative. Musculoskeletal: Positive for back pain (chronic). Negative for myalgias. Skin: Negative. Allergic/Immunologic: Negative for environmental allergies and food allergies. Neurological: Negative for dizziness, weakness and headaches. Psychiatric/Behavioral: Negative for dysphoric mood and sleep disturbance. The patient is not nervous/anxious. Objective:     Vitals:    07/20/21 1326   BP: 134/86   Pulse: 68   SpO2: 99%   Weight: 213 lb (96.6 kg)        Estimated body mass index is 32.39 kg/m² as calculated from the following:    Height as of 12/15/20: 5' 8\" (1.727 m). Weight as of this encounter: 213 lb (96.6 kg). Physical Exam  Constitutional:       Appearance: Normal appearance. He is well-developed and well-groomed. HENT:      Head: Normocephalic. Eyes:      Conjunctiva/sclera: Conjunctivae normal.   Neck:      Thyroid: No thyromegaly. Cardiovascular:      Rate and Rhythm: Normal rate and regular rhythm. Heart sounds: Normal heart sounds. Pulmonary:      Effort: Pulmonary effort is normal.      Breath sounds: Normal breath sounds. No wheezing. Abdominal:      General: Bowel sounds are normal.      Palpations: Abdomen is soft. Tenderness: There is no abdominal tenderness. Musculoskeletal:      Cervical back: Neck supple. Right lower leg: No edema. Left lower leg: No edema. Lymphadenopathy:      Cervical: No cervical adenopathy. Skin:     Capillary Refill: Capillary refill takes less than 2 seconds.    Neurological:      Mental Status: He is alert and diabetes mellitus with complication (HCC)  -     POCT glycosylated hemoglobin (Hb A1C)  -     SITagliptin (JANUVIA) 100 MG tablet; Take 1 tablet by mouth daily  -     Lipid, Fasting; Future  -     Comprehensive Metabolic Panel; Future  -     Microalbumin, Ur; Future    Essential hypertension  -     Lipid, Fasting; Future  -     Comprehensive Metabolic Panel; Future  -     CBC Auto Differential; Future  -     Microalbumin, Ur; Future    Mixed hyperlipidemia  -     Lipid, Fasting; Future  -     Comprehensive Metabolic Panel; Future    Mild persistent asthma without complication  -     CBC Auto Differential; Future    Gastroesophageal reflux disease without esophagitis  -     CBC Auto Differential; Future    Rheumatoid arthritis, involving unspecified site, unspecified whether rheumatoid factor present ([facility]    Personal history of prostate cancer    Screening for viral disease  -     Hepatitis C Antibody; Future        Results for POC orders placed in visit on 07/20/21   POCT glycosylated hemoglobin (Hb A1C)   Result Value Ref Range    Hemoglobin A1C 7.7 %        Plan:     Orders Placed This Encounter   Procedures    Lipid, Fasting     Standing Status:   Future     Standing Expiration Date:   7/20/2022    Comprehensive Metabolic Panel     Standing Status:   Future     Standing Expiration Date:   7/20/2022    CBC Auto Differential     Standing Status:   Future     Standing Expiration Date:   9/18/2021   Mickeal Miser, Ur     Standing Status:   Future     Standing Expiration Date:   7/20/2022    Hepatitis C Antibody     Standing Status:   Future     Standing Expiration Date:   7/20/2022    POCT glycosylated hemoglobin (Hb A1C)     Orders Placed This Encounter   Medications    SITagliptin (JANUVIA) 100 MG tablet     Sig: Take 1 tablet by mouth daily     Dispense:  90 tablet     Refill:  3     Increase Januvia to 100 mg tablet. Continue current medication.  Recheck in 3 months, sooner should new symptoms or problems arise. Patient given educational materials - see patient instructions. Discussed use, benefit, and side effects of prescribed medications. All patient questions answered. Pt voiced understanding. Health Maintenance reviewed. Instructed to continue current medications, diet and exercise. Patient agreed with treatment plan. Follow up as directed.      Electronically signed by EDMOND Weber CNP on 7/27/2021

## 2021-07-27 ASSESSMENT — ENCOUNTER SYMPTOMS
SHORTNESS OF BREATH: 0
ABDOMINAL PAIN: 0
COUGH: 0
CONSTIPATION: 0
EYES NEGATIVE: 1
WHEEZING: 0
DIARRHEA: 0
BACK PAIN: 1

## 2021-10-13 LAB
ALBUMIN/GLOBULIN RATIO: 1.3 G/DL
ALBUMIN: 3.8 G/DL (ref 3.5–5)
ALP BLD-CCNC: 66 UNITS/L (ref 38–126)
ALT SERPL-CCNC: 19 UNITS/L (ref 4–50)
ANION GAP SERPL CALCULATED.3IONS-SCNC: 9 MMOL/L
AST SERPL-CCNC: 24 UNITS/L (ref 17–59)
BASOPHILS %: 1.45 (ref 0–3)
BASOPHILS ABSOLUTE: 0.05 (ref 0–0.3)
BILIRUB SERPL-MCNC: 0.6 MG/DL (ref 0.2–1.3)
BUN BLDV-MCNC: 17 MG/DL (ref 9–20)
CALCIUM SERPL-MCNC: 9.5 MG/DL (ref 8.4–10.2)
CHLORIDE BLD-SCNC: 105 MMOL/L (ref 98–120)
CHOLESTEROL/HDL RATIO: 3.3 RATIO (ref 0–4.5)
CHOLESTEROL: 142 MG/DL (ref 50–200)
CO2: 25 MMOL/L (ref 22–31)
CREAT SERPL-MCNC: 1.3 MG/DL (ref 0.7–1.3)
CREATININE, RANDOM URINE: 130.4 MG/DL (ref 20–370)
EOSINOPHILS %: 2.8 (ref 0–10)
EOSINOPHILS ABSOLUTE: 0.1 (ref 0–1.1)
GFR CALCULATED: 56.7
GLOBULIN: 2.9 G/DL
GLUCOSE: 139 MG/DL (ref 75–110)
HCT VFR BLD CALC: 38.5 % (ref 42–52)
HDLC SERPL-MCNC: 43 MG/DL (ref 36–68)
HEMOGLOBIN: 13.4 (ref 13.8–17.8)
HEPATITIS C ANTIBODY: NONREACTIVE
LDL CHOLESTEROL CALCULATED: 65.4 MG/DL (ref 0–160)
LYMPHOCYTE %: 27.61 (ref 20–51.1)
LYMPHOCYTES ABSOLUTE: 1 (ref 1–5.5)
MCH RBC QN AUTO: 29 PG (ref 28.5–32.5)
MCHC RBC AUTO-ENTMCNC: 34.9 G/DL (ref 32–37)
MCV RBC AUTO: 83 FL (ref 80–94)
MICROALBUMIN UR-MCNC: 1.1 MG/DL (ref 0–1.7)
MICROALBUMIN/CREAT UR-RTO: 8.43
MONOCYTES %: 11.29 (ref 1.7–9.3)
MONOCYTES ABSOLUTE: 0.41 (ref 0.1–1)
NEUTROPHILS %: 56.86 (ref 42.2–75.2)
NEUTROPHILS ABSOLUTE: 2.06 (ref 2–8.1)
PDW BLD-RTO: 10.7 % (ref 10–15.5)
PLATELET # BLD: 120 THOU/MM3 (ref 130–400)
POTASSIUM SERPL-SCNC: 4.6 MMOL/L (ref 3.6–5)
RBC: 4.63 M/UL (ref 4.7–6.1)
SODIUM BLD-SCNC: 140 MMOL/L (ref 135–145)
TOTAL PROTEIN, SERUM: 6.6 G/DL (ref 6.3–8.2)
TRIGL SERPL-MCNC: 168 MG/DL (ref 10–250)
VLDLC SERPL CALC-MCNC: 34 MG/DL (ref 0–50)
WBC: 3.6 THOU/ML3 (ref 4.8–10.8)

## 2021-10-13 ASSESSMENT — LIFESTYLE VARIABLES
HOW OFTEN DO YOU HAVE SIX OR MORE DRINKS ON ONE OCCASION: 0
HOW OFTEN DURING THE LAST YEAR HAVE YOU FAILED TO DO WHAT WAS NORMALLY EXPECTED FROM YOU BECAUSE OF DRINKING: NEVER
HOW OFTEN DURING THE LAST YEAR HAVE YOU NEEDED AN ALCOHOLIC DRINK FIRST THING IN THE MORNING TO GET YOURSELF GOING AFTER A NIGHT OF HEAVY DRINKING: 0
HOW OFTEN DO YOU HAVE SIX OR MORE DRINKS ON ONE OCCASION: NEVER
HAVE YOU OR SOMEONE ELSE BEEN INJURED AS A RESULT OF YOUR DRINKING: 0
AUDIT TOTAL SCORE: 0
HOW OFTEN DURING THE LAST YEAR HAVE YOU HAD A FEELING OF GUILT OR REMORSE AFTER DRINKING: NEVER
HAS A RELATIVE, FRIEND, DOCTOR, OR ANOTHER HEALTH PROFESSIONAL EXPRESSED CONCERN ABOUT YOUR DRINKING OR SUGGESTED YOU CUT DOWN: NO
HOW MANY STANDARD DRINKS CONTAINING ALCOHOL DO YOU HAVE ON A TYPICAL DAY: 0
HOW OFTEN DURING THE LAST YEAR HAVE YOU BEEN UNABLE TO REMEMBER WHAT HAPPENED THE NIGHT BEFORE BECAUSE YOU HAD BEEN DRINKING: NEVER
HOW OFTEN DURING THE LAST YEAR HAVE YOU FOUND THAT YOU WERE NOT ABLE TO STOP DRINKING ONCE YOU HAD STARTED: 0
HOW OFTEN DURING THE LAST YEAR HAVE YOU FOUND THAT YOU WERE NOT ABLE TO STOP DRINKING ONCE YOU HAD STARTED: NEVER
AUDIT TOTAL SCORE: 2
HOW OFTEN DO YOU HAVE A DRINK CONTAINING ALCOHOL: 2
HOW OFTEN DO YOU HAVE A DRINK CONTAINING ALCOHOL: TWO TO FOUR TIMES A MONTH
HOW OFTEN DURING THE LAST YEAR HAVE YOU NEEDED AN ALCOHOLIC DRINK FIRST THING IN THE MORNING TO GET YOURSELF GOING AFTER A NIGHT OF HEAVY DRINKING: NEVER
HOW OFTEN DURING THE LAST YEAR HAVE YOU FAILED TO DO WHAT WAS NORMALLY EXPECTED FROM YOU BECAUSE OF DRINKING: 0
HOW OFTEN DURING THE LAST YEAR HAVE YOU BEEN UNABLE TO REMEMBER WHAT HAPPENED THE NIGHT BEFORE BECAUSE YOU HAD BEEN DRINKING: 0
HOW OFTEN DURING THE LAST YEAR HAVE YOU HAD A FEELING OF GUILT OR REMORSE AFTER DRINKING: 0
AUDIT-C TOTAL SCORE: 0
HOW MANY STANDARD DRINKS CONTAINING ALCOHOL DO YOU HAVE ON A TYPICAL DAY: ONE OR TWO
HAS A RELATIVE, FRIEND, DOCTOR, OR ANOTHER HEALTH PROFESSIONAL EXPRESSED CONCERN ABOUT YOUR DRINKING OR SUGGESTED YOU CUT DOWN: 0
HAVE YOU OR SOMEONE ELSE BEEN INJURED AS A RESULT OF YOUR DRINKING: NO
AUDIT-C TOTAL SCORE: 2

## 2021-10-13 ASSESSMENT — PATIENT HEALTH QUESTIONNAIRE - PHQ9
SUM OF ALL RESPONSES TO PHQ QUESTIONS 1-9: 0
2. FEELING DOWN, DEPRESSED OR HOPELESS: 0
SUM OF ALL RESPONSES TO PHQ QUESTIONS 1-9: 0
SUM OF ALL RESPONSES TO PHQ9 QUESTIONS 1 & 2: 0
1. LITTLE INTEREST OR PLEASURE IN DOING THINGS: 0
SUM OF ALL RESPONSES TO PHQ QUESTIONS 1-9: 0

## 2021-10-20 ENCOUNTER — OFFICE VISIT (OUTPATIENT)
Dept: FAMILY MEDICINE CLINIC | Age: 76
End: 2021-10-20
Payer: MEDICARE

## 2021-10-20 VITALS
HEIGHT: 68 IN | OXYGEN SATURATION: 98 % | BODY MASS INDEX: 30.71 KG/M2 | SYSTOLIC BLOOD PRESSURE: 132 MMHG | WEIGHT: 202.6 LBS | HEART RATE: 64 BPM | DIASTOLIC BLOOD PRESSURE: 78 MMHG

## 2021-10-20 DIAGNOSIS — J45.30 MILD PERSISTENT ASTHMA WITHOUT COMPLICATION: ICD-10-CM

## 2021-10-20 DIAGNOSIS — E11.8 TYPE 2 DIABETES MELLITUS WITH COMPLICATION (HCC): ICD-10-CM

## 2021-10-20 DIAGNOSIS — I10 ESSENTIAL HYPERTENSION: ICD-10-CM

## 2021-10-20 DIAGNOSIS — K21.9 GASTROESOPHAGEAL REFLUX DISEASE WITHOUT ESOPHAGITIS: ICD-10-CM

## 2021-10-20 DIAGNOSIS — E78.2 MIXED HYPERLIPIDEMIA: ICD-10-CM

## 2021-10-20 DIAGNOSIS — M06.9 RHEUMATOID ARTHRITIS, INVOLVING UNSPECIFIED SITE, UNSPECIFIED WHETHER RHEUMATOID FACTOR PRESENT (HCC): ICD-10-CM

## 2021-10-20 DIAGNOSIS — Z00.00 ROUTINE GENERAL MEDICAL EXAMINATION AT A HEALTH CARE FACILITY: Primary | ICD-10-CM

## 2021-10-20 LAB — HBA1C MFR BLD: 7.2 %

## 2021-10-20 PROCEDURE — 1123F ACP DISCUSS/DSCN MKR DOCD: CPT | Performed by: NURSE PRACTITIONER

## 2021-10-20 PROCEDURE — 3051F HG A1C>EQUAL 7.0%<8.0%: CPT | Performed by: NURSE PRACTITIONER

## 2021-10-20 PROCEDURE — 83036 HEMOGLOBIN GLYCOSYLATED A1C: CPT | Performed by: NURSE PRACTITIONER

## 2021-10-20 PROCEDURE — G8484 FLU IMMUNIZE NO ADMIN: HCPCS | Performed by: NURSE PRACTITIONER

## 2021-10-20 PROCEDURE — G0439 PPPS, SUBSEQ VISIT: HCPCS | Performed by: NURSE PRACTITIONER

## 2021-10-20 PROCEDURE — PBSHW POCT GLYCOSYLATED HEMOGLOBIN (HGB A1C): Performed by: NURSE PRACTITIONER

## 2021-10-20 PROCEDURE — 4040F PNEUMOC VAC/ADMIN/RCVD: CPT | Performed by: NURSE PRACTITIONER

## 2021-10-20 NOTE — PROGRESS NOTES
Medicare Annual Wellness Visit  Name: Fautma Serrato Date: 10/20/2021   MRN: [id number] Sex: Male   Age: 64 y.o. Ethnicity: Non- / Non    : 1945 Race: White (non-)      Geovani Tejada is here for Medicare AWV (c/o right shoulder pain for about month hurts when lays on side, mobility is a little limited especially if crosses arms across chest ), Hypertension (denies chest pains sob dizziness leg edema, reports since starting chemo having issues with urination states sometimes does not make it to bathroom), Gastroesophageal Reflux, Hyperlipidemia, and Diabetes (bs ck regularly staets running around 100's )    Screenings for behavioral, psychosocial and functional/safety risks, and cognitive dysfunction are all negative except as indicated below. These results, as well as other patient data from the [address] form, are documented in Flowsheets linked to this Encounter. Allergies   Allergen Reactions    Crestor [Rosuvastatin]     Meloxicam          Prior to Visit Medications    Medication Sig Taking?  Authorizing Provider   tamsulosin (FLOMAX) 0.4 MG capsule  Yes Historical Provider, MD   lisinopril (PRINIVIL;ZESTRIL) 20 MG tablet Take 1 tablet by mouth daily Yes Iberia Levels, APRN - CNP   simvastatin (ZOCOR) 40 MG tablet Take 1 tablet by mouth nightly Yes Carri Levels, APRN - CNP   metFORMIN (GLUCOPHAGE) 1000 MG tablet TAKE 1 TABLET TWICE A DAY WITH MEALS Yes Iberia Levels, APRN - CNP   esomeprazole ([address]) 40 MG delayed release capsule TAKE 1 CAPSULE EVERY MORNING BEFORE BREAKFAST Yes Iberia Levels, APRN - CNP   leuprolide acetate, 6 Month, (ELIGARD) 45 MG injection Inject 45 mg into the skin once Yes Historical Provider, MD   doxycycline monohydrate (MONODOX) 100 MG capsule Take 100 mg by mouth daily Yes Historical Provider, MD   diazePAM (VALIUM) 5 MG tablet TAKE 2 TABLETS BY MOUTH IN THE MORNING BEFORE BIOPSY Yes Historical Provider, MD   blood glucose monitor strips 1 strip by Other route 2 times daily Test one to two times a day & as needed for symptoms of irregular blood glucose.  Dispense sufficient amount for indicated testing frequency plus additional to accommodate PRN testing needs for One touch ultra meter Yes EDMOND Conde CNP   albuterol sulfate HFA (PROAIR HFA) 108 (90 Base) MCG/ACT inhaler Inhale 2 puffs into the lungs every 4 hours as needed for Wheezing or Shortness of Breath (cough) Yes Dolph Bamberger, APRN - NP   PROAIR  (90 Base) MCG/ACT inhaler USE 2 INHALATIONS EVERY 6 HOURS AS NEEDED FOR WHEEZING Yes Shalini Flor MD   DULERA 200-5 MCG/ACT inhaler USE 2 INHALATIONS EVERY 12 HOURS Yes Shalini Flor MD   ONE TOUCH ULTRA TEST strip USE TO TEST BLOOD SUGAR TWICE A DAY Yes Shalini Flor MD   Multiple Vitamins-Minerals (MULTIVITAMIN & MINERAL PO) Take by mouth Yes Historical Provider, MD   SITagliptin (JANUVIA) 100 MG tablet Take 1 tablet by mouth daily  EDMOND Conde CNP         Past Medical History:   Diagnosis Date    GERD (gastroesophageal reflux disease)     Hyperlipidemia     Hypertension     Kidney stone     [address] ([address]     Prostate cancer ([address] 2018    Dr Herminia sanchez ; saturatio biopsies     Prostate cancer ([address] 3/26/2020    Rheumatoid arthritis ([address]     Sepsis ([address] 10/26/2018    Type 2 diabetes mellitus ([address]        Past Surgical History:   Procedure Laterality Date    CARDIAC CATHETERIZATION      CHOLECYSTECTOMY      COLONOSCOPY  2018    Dr Mee Oconnell ;negative     NERVE SURGERY Left 7/26/2019    Left L4/L5 L5/S1 FACET performed by Kenisha Macdonald MD at [address] [place] [address] Dr Left 8/13/2019    Left L4/L5 L5/S1 FACET performed by Kenisha Macdonald MD at [place] [address]  2018    Dr Chloé Damian History   Problem Relation Age of Onset    Diabetes Mother        CareTeam (Including outside providers/suppliers regularly involved in providing care):   Patient Care Team:  Mary Valencia Catrachito Hagan CNP as PCP - General (Family Medicine)  EDMOND Conde CNP as PCP - [facility]    Wt Readings from Last 3 Encounters:   10/20/21 202 lb 9.6 oz (91.9 kg)   07/20/21 213 lb (96.6 kg)   04/20/21 208 lb (94.3 kg)     Vitals:    10/20/21 1320   BP: 132/78   Pulse: 64   SpO2: 98%   Weight: 202 lb 9.6 oz (91.9 kg)   Height: 5' 8\" (1.727 m)     Body mass index is 30.81 kg/m². Based upon direct observation of the patient, evaluation of cognition reveals recent and remote memory intact. Physical Exam  Constitutional:       Appearance: Normal appearance. He is well-developed and well-groomed. He is obese. HENT:      Head: Normocephalic. Eyes:      Conjunctiva/sclera: Conjunctivae normal.   Neck:      Thyroid: No thyromegaly. Vascular: No carotid bruit. Cardiovascular:      Rate and Rhythm: Normal rate and regular rhythm. Heart sounds: Normal heart sounds. Pulmonary:      Effort: Pulmonary effort is normal.      Breath sounds: Normal breath sounds. No wheezing. Abdominal:      General: Bowel sounds are normal.      Palpations: Abdomen is soft. Tenderness: There is no abdominal tenderness. Musculoskeletal:      Cervical back: Neck supple. Right lower leg: No edema. Left lower leg: No edema. Lymphadenopathy:      Cervical: No cervical adenopathy. Skin:     Capillary Refill: Capillary refill takes less than 2 seconds. Neurological:      Mental Status: He is alert and oriented to person, place, and time. Gait: Gait normal.   Psychiatric:         Mood and Affect: Mood normal.         Behavior: Behavior is cooperative. Patient's complete Health Risk Assessment and screening values have been reviewed and are found in Flowsheets. The following problems were reviewed today and where indicated follow up appointments were made and/or referrals ordered.     Positive Risk Factor Screenings with Interventions:            General Health and older Fluzone, Flulaval, Fluarix, and 3 yrs and older Afluria) 08/26/2020    Influenza, Quadv, adjuvanted, 65 yrs +, IM, PF (Fluad) 08/26/2020, 08/29/2021    Influenza, Triv, inactivated, subunit, adjuvanted, IM (Fluad 65 yrs and older) 09/16/2019    Pneumococcal Conjugate 13-valent ([id number]) 04/16/2015, 09/11/2017    Pneumococcal Polysaccharide ([id number]) 03/09/2017    Tdap (Boostrix, Adacel) 09/11/2017    Zoster Live (Zostavax) 10/20/2016    Zoster Recombinant (Shingrix) 02/27/2020, 05/20/2020        Health Maintenance   Topic Date Due    COVID-19 Vaccine (3 - [employer] booster) 08/15/2021    Annual Wellness Visit (AWV)  12/16/2021    PSA counseling  06/02/2022    Lipid screen  10/13/2022    Potassium monitoring  10/13/2022    Creatinine monitoring  10/13/2022    DTaP/Tdap/Td vaccine (2 - Td or Tdap) 09/11/2027    Flu vaccine  Completed    Shingles Vaccine  Completed    Pneumococcal 65+ years Vaccine  Completed    Hepatitis C screen  Completed    Hepatitis A vaccine  Aged Out    Hib vaccine  Aged Out    Meningococcal (ACWY) vaccine  Aged Out     Recommendations for [place] Due: see orders and patient instructions/AVS.  . Recommended screening schedule for the next 5-10 years is provided to the patient in written form: see Patient Liliana العلي was seen today for medicare awv, hypertension, gastroesophageal reflux, hyperlipidemia and diabetes.     Diagnoses and all orders for this visit:    Routine general medical examination at a health care facility    Type 2 diabetes mellitus with complication ([facility]  -     POCT glycosylated hemoglobin (Hb A1C)    Essential hypertension    Mixed hyperlipidemia    Rheumatoid arthritis, involving unspecified site, unspecified whether rheumatoid factor present (HCC)    Mild persistent asthma without complication    Gastroesophageal reflux disease without esophagitis              Lab Results   Component Value Date    LABA1C 7.2 10/20/2021    LABA1C 7.7 07/20/2021    LABA1C 7.2 04/20/2021     Lab Results   Component Value Date    LABMICR 1.1 10/13/2021    LDLCALC 65.4 10/13/2021    CREATININE 1.3 10/13/2021       Advance Care Planning   Advanced Care Planning: Discussed the patients choices for care and treatment in case of a health event that adversely affects decision-making abilities. Also discussed the patients long-term treatment options. Reviewed with the patient the [address] [place] [address] Declaration forms  Reviewed the process of designating a competent adult as an Agent (or -in-fact) that could take make health care decisions for the patient if incompetent. Patient was asked to complete the declaration forms, either acknowledge the forms by a public notary or an eligible witness and provide a signed copy to the practice office. Time spent (minutes): 2     Obesity Counseling: Assessed behavioral health risks and factors affecting choice of behavior. Suggested weight control approaches, including dietary changes behavioral modification and follow up plan. Provided educational and support documentation. Time spent (minutes): 2    Cardiovascular Disease Risk Counseling: Assessed the patient's risk to develop cardiovascular disease and reviewed main risk factors. Reviewed steps to reduce disease risk including:   · Quitting tobacco use, reducing amount smoked, or not starting the habit  · Making healthy food choices  · Being physically active and gradualy increasing activity levels   · Reduce weight and determine a healthy BMI goal  · Monitor blood pressure and treat if higher than 140/90 mmHg  · Maintain blood total cholesterol levels under 5 mmol/l or 190 mg/dl  · Maintain LDL cholesterol levels under 3.0 mmol/l or 115 mg/dl   · Control blood glucose levels  · Consider taking aspirin (75 mg daily), once blood pressure is controlled   Provided a follow up plan.   Time spent (minutes):

## 2021-10-20 NOTE — PATIENT INSTRUCTIONS
Advance Directives: Care Instructions  Overview  An advance directive is a legal way to state your wishes at the end of your life. It tells your family and your doctor what to do if you can't say what you want. There are two main types of advance directives. You can change them any time your wishes change. Living will. This form tells your family and your doctor your wishes about life support and other treatment. The form is also called a declaration. Medical power of . This form lets you name a person to make treatment decisions for you when you can't speak for yourself. This person is called a health care agent (health care proxy, health care surrogate). The form is also called a durable power of  for health care. If you do not have an advance directive, decisions about your medical care may be made by a family member, or by a doctor or a  who doesn't know you. It may help to think of an advance directive as a gift to the people who care for you. If you have one, they won't have to make tough decisions by themselves. Follow-up care is a key part of your treatment and safety. Be sure to make and go to all appointments, and call your doctor if you are having problems. It's also a good idea to know your test results and keep a list of the medicines you take. What should you include in an advance directive? Many states have a unique advance directive form. (It may ask you to address specific issues.) Or you might use a universal form that's approved by many states. If your form doesn't tell you what to address, it may be hard to know what to include in your advance directive. Use the questions below to help you get started. · Who do you want to make decisions about your medical care if you are not able to? · What life-support measures do you want if you have a serious illness that gets worse over time or can't be cured? · What are you most afraid of that might happen? (Maybe you're afraid of having pain, losing your independence, or being kept alive by machines.)  · Where would you prefer to die? (Your home? A hospital? A nursing home?)  · Do you want to donate your organs when you die? · Do you want certain Yazidi practices performed before you die? When should you call for help? Be sure to contact your doctor if you have any questions. Where can you learn more? Go to https://[email or web address].P [email or web address] Silk. org and sign in to your [email or web address] account. Enter R264 in the Problemsolutions24 box to learn more about \"Advance Directives: Care Instructions. \"     If you do not have an account, please click on the \"Sign Up Now\" link. Current as of: March 17, 2021               Content Version: 13.0  © 2006-2021 Healthwise, [employer]. Care instructions adapted under license by [facility] ([facility]). If you have questions about a medical condition or this instruction, always ask your healthcare professional. [place] [address] any warranty or liability for your use of this information. Learning About Medical Power of   What is a medical power of ? A medical power of , also called a durable power of  for health care, is one type of the legal forms called advance directives. It lets you name the person you want to make treatment decisions for you if you can't speak or decide for yourself. The person you choose is called your health care agent. This person is also called a health care proxy or health care surrogate. A medical power of  may be called something else in your state. How do you choose a health care agent? Choose your health care agent carefully. This person may or may not be a family member. Talk to the person before you make your final decision. Make sure he or she is comfortable with this responsibility. It's a good idea to choose someone who:  · Is at least 25years old.   · Knows you well and you learn more? Go to https://chpepiceweb.ISIS [email or web address] org and sign in to your SmartCrowdz account. Enter [id number] in the [facility] box to learn more about \"Learning About [address]. \"     If you do not have an account, please click on the \"Sign Up Now\" link. Current as of: March 17, 2021               Content Version: 13.0  © 2006-2021 Healthwise, "[employer]". Care instructions adapted under license by [facility] [place] ([facility]). If you have questions about a medical condition or this instruction, always ask your healthcare professional. [address] any warranty or liability for your use of this information. Learning About Living Perroy  What is a living will? A living will, also called a declaration, is a legal form. It tells your family and your doctor your wishes when you can't speak for yourself. It's used by the health professionals who will treat you as you near the end of your life or if you get seriously hurt or ill. If you put your wishes in writing, your loved ones and others will know what kind of care you want. They won't need to guess. This can ease your mind and be helpful to others. And you can change or cancel your living will at any time. A living will is not the same as an estate or property will. An estate will explains what you want to happen with your money and property after you die. How do you use it? A living will is used to describe the kinds of treatment or life support you want as you near the end of your life or if you get seriously hurt or ill. Keep these facts in mind about living pyle. · Your living will is used only if you can't speak or make decisions for yourself. Most often, one or more doctors must certify that you can't speak or decide for yourself before your living will takes effect. · If you get better and can speak for yourself again, you can accept or refuse any treatment.  It doesn't matter what you said in your living will. · Some states may limit your right to refuse treatment in certain cases. For example, you may need to clearly state in your living will that you don't want artificial hydration and nutrition, such as being fed through a tube. Is a living will a legal document? A living will is a legal document. Each state has its own laws about living pyle. And a living will may be called something else in your state. Here are some things to know about living pyle. · You don't need an  to complete a living will. But legal advice can be helpful if your state's laws are unclear. It can also help if your health history is complicated or your family can't agree on what should be in your living will. · You can change your living will at any time. Some people find that their wishes about end-of-life care change as their health changes. If you make big changes to your living will, complete a new form. · If you move to another state, make sure that your living will is legal in the state where you now live. In most cases, doctors will respect your wishes even if you have a form from a different state. · You might use a universal form that has been approved by many states. This kind of form can sometimes be filled out and stored online. Your digital copy will then be available wherever you have a connection to the internet. The doctors and nurses who need to treat you can find it right away. · Your state may offer an online registry. This is another place where you can store your living will online. · It's a good idea to get your living will notarized. This means using a person called a  to watch two people sign, or witness, your living will. What should you know when you create a living will? Here are some questions to ask yourself as you make your living will:  · Do you know enough about life support methods that might be used?  If not, talk to your doctor so you know what might be done if you can't breathe on your own, your heart stops, or you can't swallow. · What things would you still want to be able to do after you receive life-support methods? Would you want to be able to walk? To speak? To eat on your own? To live without the help of machines? · Do you want certain Druze practices performed if you become very ill? · If you have a choice, where do you want to be cared for? In your home? At a hospital or nursing home? · If you have a choice at the end of your life, where would you prefer to die? At home? In a hospital or nursing home? Somewhere else? · Would you prefer to be buried or cremated? · Do you want your organs to be donated after you die? What should you do with your living will? · Make sure that your family members and your health care agent have copies of your living will (also called a declaration). · Give your doctor a copy of your living will. Ask him or her to keep it as part of your medical record. If you have more than one doctor, make sure that each one has a copy. · Put a copy of your living will where it can be easily found. For example, some people may put a copy on their refrigerator door. If you are using a digital copy, be sure your doctor, family members, and health care agent know how to find and access it. Where can you learn more? Go to [email or web address]://[email or web address] and sign in to your Backchat account. Enter X588 in the [facility] box to learn more about \"Learning About Living [place]. \"     If you do not have an account, please click on the \"Sign Up Now\" link. Current as of: March 17, 2021               Content Version: 13.0  © 1746-2561 Healthwise, Incorporated. Care instructions adapted under license by [address] If you have questions about a medical condition or this instruction, always ask your healthcare professional. [address] any warranty or liability for your use of this information. Learning About Healthy Weight  What is a healthy weight? A healthy weight is the weight at which you feel good about yourself and have energy for work and play. It's also one that lowers your risk for health problems. What can you do to stay at a healthy weight? It can be hard to stay at a healthy weight, especially when fast food, vending-machine snacks, and processed foods are so easy to find. And with your busy lifestyle, activity may be low on your list of things to do. But staying at a healthy weight may be easier than you think. Here are some dos and don'ts for staying at a healthy weight. Do eat healthy foods  The kinds of foods you eat have a big impact on both your weight and your health. Reaching and staying at a healthy weight is not about going on a diet. It's about making healthier food choices every day and changing your diet for good. Healthy eating means eating a variety of foods so that you get all the nutrients you need. Your body needs protein, carbohydrate, and fats for energy. They keep your heart beating, your brain active, and your muscles working. On most days, try to eat from each food group. This means eating a variety of:  · Whole grains, such as whole wheat breads and pastas. · Fruits and vegetables. · Dairy products, such as low-fat milk, yogurt, and cheese. · Lean proteins, such as all types of fish, chicken without the skin, and beans. Don't have too much or too little of one thing. All foods, if eaten in moderation, can be part of healthy eating. Even sweets can be okay. If your favorite foods are high in fat, salt, sugar, or calories, limit how often you eat them. Eat smaller servings, or look for healthy substitutes. Do watch what you eat  Many people eat more than their bodies need. Part of staying at a healthy weight means learning how much food you really need from day to day and not eating more than that.  Even with healthy foods, eating too much can make you gain weight. Having a well-balanced diet means that you eat enough, but not too much, and that your food gives you the nutrients you need to stay healthy. So listen to your body. Eat when you're hungry. Stop when you feel satisfied. It's a good idea to have healthy snacks ready for when you get hungry. Keep healthy snacks with you at work, in your car, and at home. If you have a healthy snack easily available, you'll be less likely to pick a candy bar or bag of chips from a vending machine instead. Some healthy snacks you might want to keep on hand are fruit, low-fat yogurt, string cheese, low-fat microwave popcorn, raisins and other dried fruit, nuts, whole wheat crackers, pretzels, carrots, celery sticks, and broccoli. Do some physical activity  A big part of reaching and staying at a healthy weight is being active. When you're active, you burn calories. This makes it easier to reach and stay at a healthy weight. When you're active on a regular basis, your body burns more calories, even when you're at rest. Being active helps you lose fat and build lean muscle. Try to be active for at least 1 hour every day. This may sound like a lot, but it's okay to be active in smaller blocks of time that add up to 1 hour a day. Any activity that makes your heart beat faster and keeps it there for a while counts. A brisk walk, run, or swim will get your heart beating faster. So will climbing stairs, shooting baskets, or cycling. Even some household chores like vacuuming and mowing the lawn will get your heart rate up. Pick activities that you enjoyones that make your heart beat faster, your muscles stronger, and your muscles and joints more flexible. If you find more than one thing you like doing, do them all. You don't have to do the same thing every day. Don't diet  Diets don't work. Diets are temporary. Because you give up so much when you diet, you may be hungry and think about food all the time.  And after you stop dieting, you also may overeat to make up for what you missed. Most people who diet end up gaining back the pounds they lostand more. Remember that healthy bodies come in lots of shapes and sizes. Everyone can get healthier by eating better and being more active. Where can you learn more? Go to https://chpepiceweb.[email or web address] org and sign in to your iLive account. Enter [id number] in the [employer] box to learn more about \"Learning About Healthy Weight. \"     If you do not have an account, please click on the \"Sign Up Now\" link. Current as of: March 17, 2021               Content Version: 13.0  © 2006-2021 Healthwise, StockCast[employer]. Care instructions adapted under license by [facility] ([facility]). If you have questions about a medical condition or this instruction, always ask your healthcare professional. [place] [address] any warranty or liability for your use of this information. Eating Healthy Foods: Care Instructions  Your Care Instructions     Eating healthy foods can help lower your risk for disease. Healthy food gives you energy and keeps your heart strong, your brain active, your muscles working, and your bones strong. A healthy diet includes a variety of foods from the basic food groups: grains, vegetables, fruits, milk and milk products, and meat and beans. Some people may eat more of their favorite foods from only one food group and, as a result, miss getting the nutrients they need. So, it is important to pay attention not only to what you eat but also to what you are missing from your diet. You can eat a healthy, balanced diet by making a few small changes. Follow-up care is a key part of your treatment and safety. Be sure to make and go to all appointments, and call your doctor if you are having problems. It's also a good idea to know your test results and keep a list of the medicines you take. How can you care for yourself at home?   Look at what you eat  · Keep a food diary for a week or two and record everything you eat or drink. Track the number of servings you eat from each food group. · For a balanced diet every day, eat a variety of:  ? 6 or more ounce-equivalents of grains, such as cereals, breads, crackers, rice, or pasta, every day. An ounce-equivalent is 1 slice of bread, 1 cup of ready-to-eat cereal, or ½ cup of cooked rice, cooked pasta, or cooked cereal.  ? 2½ cups of vegetables, especially:  § Dark-green vegetables such as broccoli and spinach. § Orange vegetables such as carrots and sweet potatoes. § Dry beans (such as pemberton and kidney beans) and peas (such as lentils). ? 2 cups of fresh, frozen, or canned fruit. A small apple or 1 banana or orange equals 1 cup. ? 3 cups of nonfat or low-fat milk, yogurt, or other milk products. ? 5½ ounces of meat and beans, such as chicken, fish, lean meat, beans, nuts, and seeds. One egg, 1 tablespoon of peanut butter, ½ ounce nuts or seeds, or ¼ cup of cooked beans equals 1 ounce of meat. · Learn how to read food labels for serving sizes and ingredients. Fast-food and convenience-food meals often contain few or no fruits or vegetables. Make sure you eat some fruits and vegetables to make the meal more nutritious. · Look at your food diary. For each food group, add up what you have eaten and then divide the total by the number of days. This will give you an idea of how much you are eating from each food group. See if you can find some ways to change your diet to make it more healthy. Start small  · Do not try to make dramatic changes to your diet all at once. You might feel that you are missing out on your favorite foods and then be more likely to fail. · Start slowly, and gradually change your habits. Try some of the following:  ? Use whole wheat bread instead of white bread. ? Use nonfat or low-fat milk instead of whole milk.   ? Eat brown rice instead of white rice, and eat whole wheat pasta instead of white-flour pasta. ? Try low-fat cheeses and low-fat yogurt. ? Add more fruits and vegetables to meals and have them for snacks. ? Add lettuce, tomato, cucumber, and onion to sandwiches. ? Add fruit to yogurt and cereal.  Enjoy food  · You can still eat your favorite foods. You just may need to eat less of them. If your favorite foods are high in fat, salt, and sugar, limit how often you eat them, but do not cut them out entirely. · Eat a wide variety of foods. Make healthy choices when eating out  · The type of restaurant you choose can help you make healthy choices. Even fast-food chains are now offering more low-fat or healthier choices on the menu. · Choose smaller portions, or take half of your meal home. · When eating out, try:  ? A veggie pizza with a whole wheat crust or grilled chicken (instead of sausage or pepperoni). ? Pasta with roasted vegetables, grilled chicken, or marinara sauce instead of cream sauce. ? A vegetable wrap or grilled chicken wrap. ? Broiled or poached food instead of fried or breaded items. Make healthy choices easy  · Buy packaged, prewashed, ready-to-eat fresh vegetables and fruits, such as baby carrots, salad mixes, and chopped or shredded broccoli and cauliflower. · Buy packaged, presliced fruits, such as melon or pineapple. · Choose 100% fruit or vegetable juice instead of soda. Limit juice intake to 4 to 6 oz (½ to ¾ cup) a day. · Blend low-fat yogurt, fruit juice, and canned or frozen fruit to make a smoothie for breakfast or a snack. Where can you learn more? Go to https://Sohu.compeMeMedeweb.[employer]. org and sign in to your Aegis Identity Software account. Enter M586 in the [facility] box to learn more about \"Eating Healthy Foods: Care Instructions. \"     If you do not have an account, please click on the \"Sign Up Now\" link. Current as of: December 17, 2020               Content Version: 13.0  © 4498-2291 Healthwise, Incorporated.    Care instructions adapted under license by [facility] ([facility]). If you have questions about a medical condition or this instruction, always ask your healthcare professional. [address] any warranty or liability for your use of this information. Personalized Preventive Plan for Ernestina Ibarra - 10/20/2021  Medicare offers a range of preventive health benefits. Some of the tests and screenings are paid in full while other may be subject to a deductible, co-insurance, and/or copay. Some of these benefits include a comprehensive review of your medical history including lifestyle, illnesses that may run in your family, and various assessments and screenings as appropriate. After reviewing your medical record and screening and assessments performed today your provider may have ordered immunizations, labs, imaging, and/or referrals for you. A list of these orders (if applicable) as well as your Preventive Care list are included within your After Visit Summary for your review. Other Preventive Recommendations:    · A preventive eye exam performed by an eye specialist is recommended every 1-2 years to screen for glaucoma; cataracts, macular degeneration, and other eye disorders. · A preventive dental visit is recommended every 6 months. · Try to get at least 150 minutes of exercise per week or 10,000 steps per day on a pedometer . · Order or download the FREE \"Exercise & Physical Activity: Your Everyday Guide\" from [employer]. Call 4-466.784.1557 or search [employer] online. · You need 4135-3035 mg of calcium and 4453-6427 IU of vitamin D per day. It is possible to meet your calcium requirement with diet alone, but a vitamin D supplement is usually necessary to meet this goal.  · When exposed to the sun, use a sunscreen that protects against both UVA and UVB radiation with an SPF of 30 or greater.  Reapply every 2 to 3 hours or after sweating, drying off with a towel, or swimming. · Always wear a seat belt when traveling in a car. Always wear a helmet when riding a bicycle or motorcycle.

## 2021-10-28 DIAGNOSIS — D64.9 ANEMIA, UNSPECIFIED TYPE: ICD-10-CM

## 2021-10-28 DIAGNOSIS — C61 MALIGNANT TUMOR OF PROSTATE (HCC): Primary | ICD-10-CM

## 2021-10-28 DIAGNOSIS — D61.818 PANCYTOPENIA (HCC): ICD-10-CM

## 2021-11-02 LAB
BASOPHILS %: 1.97 (ref 0–3)
BASOPHILS ABSOLUTE: 0.07 (ref 0–0.3)
EOSINOPHILS %: 2.78 (ref 0–10)
EOSINOPHILS ABSOLUTE: 0.1 (ref 0–1.1)
HCT VFR BLD CALC: 36.5 % (ref 42–52)
HEMOGLOBIN: 12.7 (ref 13.8–17.8)
LYMPHOCYTE %: 27.24 (ref 20–51.1)
LYMPHOCYTES ABSOLUTE: 0.94 (ref 1–5.5)
MCH RBC QN AUTO: 28.9 PG (ref 28.5–32.5)
MCHC RBC AUTO-ENTMCNC: 34.9 G/DL (ref 32–37)
MCV RBC AUTO: 82.8 FL (ref 80–94)
MONOCYTES %: 12.34 (ref 1.7–9.3)
MONOCYTES ABSOLUTE: 0.43 (ref 0.1–1)
NEUTROPHILS %: 55.67 (ref 42.2–75.2)
NEUTROPHILS ABSOLUTE: 1.92 (ref 2–8.1)
PDW BLD-RTO: 10.6 % (ref 10–15.5)
PLATELET # BLD: 137.6 THOU/MM3 (ref 130–400)
RBC: 4.4 M/UL (ref 4.7–6.1)
WBC: 3.4 THOU/ML3 (ref 4.8–10.8)

## 2021-12-01 LAB — DIAGNOSTIC PSA: < 0.06 NG/ML (ref 0–4)

## 2021-12-06 ENCOUNTER — OFFICE VISIT (OUTPATIENT)
Dept: FAMILY MEDICINE CLINIC | Age: 76
End: 2021-12-06
Payer: MEDICARE

## 2021-12-06 ENCOUNTER — TELEPHONE (OUTPATIENT)
Dept: FAMILY MEDICINE CLINIC | Age: 76
End: 2021-12-06

## 2021-12-06 VITALS
HEART RATE: 60 BPM | SYSTOLIC BLOOD PRESSURE: 132 MMHG | BODY MASS INDEX: 32.33 KG/M2 | WEIGHT: 212.6 LBS | OXYGEN SATURATION: 99 % | DIASTOLIC BLOOD PRESSURE: 84 MMHG

## 2021-12-06 DIAGNOSIS — M54.42 ACUTE BILATERAL LOW BACK PAIN WITH BILATERAL SCIATICA: Primary | ICD-10-CM

## 2021-12-06 DIAGNOSIS — M54.41 ACUTE BILATERAL LOW BACK PAIN WITH BILATERAL SCIATICA: Primary | ICD-10-CM

## 2021-12-06 DIAGNOSIS — M06.9 RHEUMATOID ARTHRITIS, INVOLVING UNSPECIFIED SITE, UNSPECIFIED WHETHER RHEUMATOID FACTOR PRESENT (HCC): ICD-10-CM

## 2021-12-06 DIAGNOSIS — M25.511 ACUTE PAIN OF RIGHT SHOULDER: ICD-10-CM

## 2021-12-06 PROCEDURE — 99213 OFFICE O/P EST LOW 20 MIN: CPT | Performed by: NURSE PRACTITIONER

## 2021-12-06 PROCEDURE — G8484 FLU IMMUNIZE NO ADMIN: HCPCS | Performed by: NURSE PRACTITIONER

## 2021-12-06 PROCEDURE — 1036F TOBACCO NON-USER: CPT | Performed by: NURSE PRACTITIONER

## 2021-12-06 PROCEDURE — 1123F ACP DISCUSS/DSCN MKR DOCD: CPT | Performed by: NURSE PRACTITIONER

## 2021-12-06 PROCEDURE — G8417 CALC BMI ABV UP PARAM F/U: HCPCS | Performed by: NURSE PRACTITIONER

## 2021-12-06 PROCEDURE — 4040F PNEUMOC VAC/ADMIN/RCVD: CPT | Performed by: NURSE PRACTITIONER

## 2021-12-06 PROCEDURE — 99212 OFFICE O/P EST SF 10 MIN: CPT

## 2021-12-06 PROCEDURE — G8427 DOCREV CUR MEDS BY ELIG CLIN: HCPCS | Performed by: NURSE PRACTITIONER

## 2021-12-06 RX ORDER — DULOXETIN HYDROCHLORIDE 30 MG/1
30 CAPSULE, DELAYED RELEASE ORAL DAILY
Qty: 30 CAPSULE | Refills: 2 | Status: SHIPPED | OUTPATIENT
Start: 2021-12-06 | End: 2022-01-28 | Stop reason: SDUPTHER

## 2021-12-06 NOTE — PROGRESS NOTES
[address] [address] [address]  Dept: [phone]  Dept Fax: [phone]    History and Physical  Patient:  Nigel Mendiola  YOB: 1945  Date of Service:  2021    Subjective:   Nigel Mendiola (:  1945) is a 64 y.o. male, Established patient, here for evaluation of the following chief complaint(s):    Chief Complaint   Patient presents with    Hip Pain     left hip pain for about couple weeks now, says feels like sciatic nerve, says yesterday had trouble with thigh pain after going up and down ladder    Shoulder Pain     c/o right shoulder pain has constant pain and now radiating down into elbow says sleeps on right side and keeps him up at night       Hip pain on the left for 2-3 weeks. States he noticed pain radiating to the left knee after twisting the wrong way. Rates pain 2-3/10. The pain fluctuates and gets up to 10/10, making it very difficult to walk. He has been taking Tylenol arthritis which seems to help a little. In addition patient reports having pain to the right shoulder. Hip Pain   The incident occurred more than 1 week ago. The injury mechanism was a twisting injury. The pain is present in the left hip, left thigh and left knee. The quality of the pain is described as aching and shooting. The pain has been fluctuating since onset. Pertinent negatives include no numbness or tingling. The symptoms are aggravated by weight bearing and movement. He has tried acetaminophen for the symptoms. The treatment provided mild relief. Shoulder Pain   The pain is present in the right shoulder. This is a new problem. The current episode started 1 to 4 weeks ago. There has been no history of extremity trauma. The problem occurs intermittently. The problem has been unchanged. The quality of the pain is described as aching. The pain is mild.  Pertinent negatives include no fever, limited range of motion, numbness or tingling. The symptoms are aggravated by activity. He has tried acetaminophen for the symptoms. The treatment provided mild relief. His past medical history is significant for diabetes, osteoarthritis and rheumatoid arthritis.        The 10-year ASCVD risk score (Hal Dash, et al., 2013) is: 55%    Values used to calculate the score:      Age: 64 years      Sex: Male      Is Non- : No      Diabetic: Yes      Tobacco smoker: No      Systolic Blood Pressure: 490 mmHg      Is BP treated: Yes      HDL Cholesterol: 43 mg/dL      Total Cholesterol: 142 mg/dL     BP Readings from Last 3 Encounters:   12/06/21 132/84   10/20/21 132/78   07/20/21 134/86      Pulse Readings from Last 3 Encounters:   12/06/21 60   10/20/21 64   07/20/21 68      Wt Readings from Last 3 Encounters:   12/06/21 212 lb 9.6 oz (96.4 kg)   10/20/21 202 lb 9.6 oz (91.9 kg)   07/20/21 213 lb (96.6 kg)        Allergies   Allergen Reactions    Crestor [Rosuvastatin]     Meloxicam        Current Outpatient Medications   Medication Sig Dispense Refill    diclofenac sodium (VOLTAREN) 1 % GEL Apply topically 2 times daily      DULoxetine (CYMBALTA) 30 MG extended release capsule Take 1 capsule by mouth daily 30 capsule 2    tamsulosin (FLOMAX) 0.4 MG capsule       lisinopril (PRINIVIL;ZESTRIL) 20 MG tablet Take 1 tablet by mouth daily 90 tablet 4    simvastatin (ZOCOR) 40 MG tablet Take 1 tablet by mouth nightly 90 tablet 4    metFORMIN (GLUCOPHAGE) 1000 MG tablet TAKE 1 TABLET TWICE A DAY WITH MEALS 180 tablet 3    esomeprazole (NEXIUM) 40 MG delayed release capsule TAKE 1 CAPSULE EVERY MORNING BEFORE BREAKFAST 90 capsule 3    leuprolide acetate, 6 Month, (ELIGARD) 45 MG injection Inject 45 mg into the skin once      doxycycline monohydrate (MONODOX) 100 MG capsule Take 100 mg by mouth daily      diazePAM (VALIUM) 5 MG tablet TAKE 2 TABLETS BY MOUTH IN THE MORNING BEFORE BIOPSY      blood glucose monitor strips 1 strip by Other route 2 times daily Test one to two times a day & as needed for symptoms of irregular blood glucose. Dispense sufficient amount for indicated testing frequency plus additional to accommodate PRN testing needs for One touch ultra meter 200 strip 0    albuterol sulfate HFA (PROAIR HFA) 108 (90 Base) MCG/ACT inhaler Inhale 2 puffs into the lungs every 4 hours as needed for Wheezing or Shortness of Breath (cough) 1 Inhaler 0    PROAIR  (90 Base) MCG/ACT inhaler USE 2 INHALATIONS EVERY 6 HOURS AS NEEDED FOR WHEEZING 25.5 g 3    DULERA 200-5 MCG/ACT inhaler USE 2 INHALATIONS EVERY 12 HOURS 39 g 3    ONE TOUCH ULTRA TEST strip USE TO TEST BLOOD SUGAR TWICE A  each 3    Multiple Vitamins-Minerals (MULTIVITAMIN & MINERAL PO) Take by mouth      SITagliptin (JANUVIA) 100 MG tablet Take 1 tablet by mouth daily 90 tablet 3     No current facility-administered medications for this visit. Past Medical History:   Diagnosis Date    GERD (gastroesophageal reflux disease)     Hyperlipidemia     Hypertension     Kidney stone     Melanoma ([address]     Prostate cancer ([address] 2018    Dr Soumya Torres ; saturatio biopsies     Prostate cancer ([address] 3/26/2020    Rheumatoid arthritis ([address]     Sepsis ([address] 10/26/2018    Type 2 diabetes mellitus ([address]        Past Surgical History:   Procedure Laterality Date    CARDIAC CATHETERIZATION      CHOLECYSTECTOMY      COLONOSCOPY  2018    Dr Mallory Venegas ;negative     NERVE SURGERY Left 7/26/2019    Left L4/L5 L5/S1 FACET performed by Renny Armijo MD at [address] [place] [address] Jai 8/13/2019    Left L4/L5 L5/S1 FACET performed by Renny Armijo MD at [place] [address]  2018    Dr Soumya Torres      Family History   Problem Relation Age of Onset    Diabetes Mother        Review of Systems:     Review of Systems   Constitutional: Negative for chills, fatigue and fever. HENT: Negative.     Respiratory: Negative for cough, shortness of breath and wheezing. Cardiovascular: Negative for chest pain. Musculoskeletal: Positive for arthralgias (left hip, right shoulder), back pain (lower back) and gait problem. Neurological: Negative for tingling and numbness. PHQ Scores 10/13/2021 4/20/2021 12/15/2020 3/13/2020 9/19/2019 6/14/2018 6/8/2017   PHQ2 Score 0 0 0 0 0 0 0   PHQ9 Score 0 0 0 0 0 0 0     Interpretation of Total Score Depression Severity: 1-4 = Minimal depression, 5-9 = Mild depression, 10-14 = Moderate depression, 15-19 = Moderately severe depression, 20-27 = Severe depression     Physical Exam:     Vitals:    12/06/21 1141   BP: 132/84   Pulse: 60   SpO2: 99%   Weight: 212 lb 9.6 oz (96.4 kg)      Body mass index is 32.33 kg/m². Physical Exam  Constitutional:       Appearance: Normal appearance. He is obese. HENT:      Head: Normocephalic. Cardiovascular:      Rate and Rhythm: Normal rate and regular rhythm. Heart sounds: Normal heart sounds. Pulmonary:      Effort: Pulmonary effort is normal.      Breath sounds: Normal breath sounds. No wheezing. Musculoskeletal:      Right shoulder: No tenderness. Normal range of motion. Normal strength. Left shoulder: Normal.      Cervical back: Neck supple. Lumbar back: Tenderness present. No bony tenderness. Normal range of motion. Negative right straight leg raise test and negative left straight leg raise test.   Neurological:      Mental Status: He is alert and oriented to person, place, and time. Assessment/Plan:   1. Acute bilateral low back pain with bilateral sciatica  -     diclofenac sodium (VOLTAREN) 1 % GEL; Apply topically 2 times daily, Topical, 2 TIMES DAILY Starting Mon 12/6/2021, OTC  -     DULoxetine (CYMBALTA) 30 MG extended release capsule; Take 1 capsule by mouth daily, Disp-30 capsule, R-2Normal  2. Acute pain of right shoulder  3.  Rheumatoid arthritis, involving unspecified site, unspecified whether rheumatoid factor present [facility])     Patient agreed with treatment plan. Follow up as directed. Return if symptoms worsen or fail to improve. Please note that this chart was generated using voice recognition Dragon dictation software. Although every effort was made to ensure the accuracy of this automated transcription, some errors in transcription may have occurred.     Electronically signed by EDMOND Orozco CNP on 12/12/2021

## 2021-12-06 NOTE — PATIENT INSTRUCTIONS
Patient Education      Patient Education        Low Back Pain: Exercises  Introduction  Here are some examples of exercises for you to try. The exercises may be suggested for a condition or for rehabilitation. Start each exercise slowly. Ease off the exercises if you start to have pain. You will be told when to start these exercises and which ones will work best for you. How to do the exercises  Press-up    1. Lie on your stomach, supporting your body with your forearms. 2. Press your elbows down into the floor to raise your upper back. As you do this, relax your stomach muscles and allow your back to arch without using your back muscles. As your press up, do not let your hips or pelvis come off the floor. 3. Hold for 15 to 30 seconds, then relax. 4. Repeat 2 to 4 times. Alternate arm and leg (bird dog) exercise    Do this exercise slowly. Try to keep your body straight at all times, and do not let one hip drop lower than the other. 1. Start on the floor, on your hands and knees. 2. Tighten your belly muscles. 3. Raise one leg off the floor, and hold it straight out behind you. Be careful not to let your hip drop down, because that will twist your trunk. 4. Hold for about 6 seconds, then lower your leg and switch to the other leg. 5. Repeat 8 to 12 times on each leg. 6. Over time, work up to holding for 10 to 30 seconds each time. 7. If you feel stable and secure with your leg raised, try raising the opposite arm straight out in front of you at the same time. Knee-to-chest exercise    1. Lie on your back with your knees bent and your feet flat on the floor. 2. Bring one knee to your chest, keeping the other foot flat on the floor (or keeping the other leg straight, whichever feels better on your lower back). 3. Keep your lower back pressed to the floor. Hold for at least 15 to 30 seconds. 4. Relax, and lower the knee to the starting position. 5. Repeat with the other leg.  Repeat 2 to 4 times with each leg. 6. To get more stretch, put your other leg flat on the floor while pulling your knee to your chest.  Curl-ups    1. Lie on the floor on your back with your knees bent at a 90-degree angle. Your feet should be flat on the floor, about 12 inches from your buttocks. 2. Cross your arms over your chest. If this bothers your neck, try putting your hands behind your neck (not your head), with your elbows spread apart. 3. Slowly tighten your belly muscles and raise your shoulder blades off the floor. 4. Keep your head in line with your body, and do not press your chin to your chest.  5. Hold this position for 1 or 2 seconds, then slowly lower yourself back down to the floor. 6. Repeat 8 to 12 times. Pelvic tilt exercise    1. Lie on your back with your knees bent. 2. \"Brace\" your stomach. This means to tighten your muscles by pulling in and imagining your belly button moving toward your spine. You should feel like your back is pressing to the floor and your hips and pelvis are rocking back. 3. Hold for about 6 seconds while you breathe smoothly. 4. Repeat 8 to 12 times. Heel dig bridging    1. Lie on your back with both knees bent and your ankles bent so that only your heels are digging into the floor. Your knees should be bent about 90 degrees. 2. Then push your heels into the floor, squeeze your buttocks, and lift your hips off the floor until your shoulders, hips, and knees are all in a straight line. 3. Hold for about 6 seconds as you continue to breathe normally, and then slowly lower your hips back down to the floor and rest for up to 10 seconds. 4. Do 8 to 12 repetitions. Hamstring stretch in doorway    1. Lie on your back in a doorway, with one leg through the open door. 2. Slide your leg up the wall to straighten your knee. You should feel a gentle stretch down the back of your leg. 3. Hold the stretch for at least 15 to 30 seconds.  Do not arch your back, point your toes, or bend either knee. Keep one heel touching the floor and the other heel touching the wall. 4. Repeat with your other leg. 5. Do 2 to 4 times for each leg. Hip flexor stretch    1. Kneel on the floor with one knee bent and one leg behind you. Place your forward knee over your foot. Keep your other knee touching the floor. 2. Slowly push your hips forward until you feel a stretch in the upper thigh of your rear leg. 3. Hold the stretch for at least 15 to 30 seconds. Repeat with your other leg. 4. Do 2 to 4 times on each side. Wall sit    1. Stand with your back 10 to 12 inches away from a wall. 2. Lean into the wall until your back is flat against it. 3. Slowly slide down until your knees are slightly bent, pressing your lower back into the wall. 4. Hold for about 6 seconds, then slide back up the wall. 5. Repeat 8 to 12 times. Follow-up care is a key part of your treatment and safety. Be sure to make and go to all appointments, and call your doctor if you are having problems. It's also a good idea to know your test results and keep a list of the medicines you take. Where can you learn more? Go to https://AdvasensepeiCrederity."[employer]. org and sign in to your [employer] account. Enter V863 in the [facility] box to learn more about \"Low Back Pain: Exercises. \"     If you do not have an account, please click on the \"Sign Up Now\" link. Current as of: July 1, 2021               Content Version: 13.0  © 2006-2021 Healthwise[employer] Inc[employer]. Care instructions adapted under license by [facility] ([facility]). If you have questions about a medical condition or this instruction, always ask your healthcare professional. [place] [address] any warranty or liability for your use of this information. Sciatica: Exercises  Introduction  Here are some examples of typical rehabilitation exercises for your condition. Start each exercise slowly. Ease off the exercise if you start to have pain.   Your doctor or physical therapist will tell you when you can start these exercises and which ones will work best for you. When you are not being active, find a comfortable position for rest. Some people are comfortable on the floor or a medium-firm bed with a small pillow under their head and another under their knees. Some people prefer to lie on their side with a pillow between their knees. Don't stay in one position for too long. Take short walks (10 to 20 minutes) every 2 to 3 hours. Avoid slopes, hills, and stairs until you feel better. Walk only distances you can manage without pain, especially leg pain. How to do the exercises  Back stretches    1. Get down on your hands and knees on the floor. 2. Relax your head and allow it to droop. Round your back up toward the ceiling until you feel a nice stretch in your upper, middle, and lower back. Hold this stretch for as long as it feels comfortable, or about 15 to 30 seconds. 3. Return to the starting position with a flat back while you are on your hands and knees. 4. Let your back sway by pressing your stomach toward the floor. Lift your buttocks toward the ceiling. 5. Hold this position for 15 to 30 seconds. 6. Repeat 2 to 4 times. Follow-up care is a key part of your treatment and safety. Be sure to make and go to all appointments, and call your doctor if you are having problems. It's also a good idea to know your test results and keep a list of the medicines you take. Where can you learn more? Go to https://Optimenga777pestephyewSendah Direct.XIPWIRE. org and sign in to your [employer] account. Enter W183 in the [facility] box to learn more about \"Sciatica: Exercises. \"     If you do not have an account, please click on the \"Sign Up Now\" link. Current as of: July 1, 2021               Content Version: 13.0  © 2818-5008 Healthwise, Incorporated. Care instructions adapted under license by [facility] ([facility]).  If you have questions about a medical condition or this instruction, always ask your healthcare professional. [place] [address] any warranty or liability for your use of this information.

## 2021-12-12 ASSESSMENT — ENCOUNTER SYMPTOMS
SHORTNESS OF BREATH: 0
BACK PAIN: 1
WHEEZING: 0
COUGH: 0

## 2022-01-27 ENCOUNTER — OFFICE VISIT (OUTPATIENT)
Dept: FAMILY MEDICINE CLINIC | Age: 77
End: 2022-01-27
Payer: MEDICARE

## 2022-01-27 VITALS
BODY MASS INDEX: 31.61 KG/M2 | DIASTOLIC BLOOD PRESSURE: 80 MMHG | HEART RATE: 76 BPM | SYSTOLIC BLOOD PRESSURE: 132 MMHG | OXYGEN SATURATION: 96 % | WEIGHT: 207.9 LBS

## 2022-01-27 DIAGNOSIS — I10 ESSENTIAL HYPERTENSION: ICD-10-CM

## 2022-01-27 DIAGNOSIS — M25.511 CHRONIC RIGHT SHOULDER PAIN: ICD-10-CM

## 2022-01-27 DIAGNOSIS — R06.2 WHEEZING: ICD-10-CM

## 2022-01-27 DIAGNOSIS — M06.9 RHEUMATOID ARTHRITIS, INVOLVING UNSPECIFIED SITE, UNSPECIFIED WHETHER RHEUMATOID FACTOR PRESENT (HCC): ICD-10-CM

## 2022-01-27 DIAGNOSIS — E11.8 TYPE 2 DIABETES MELLITUS WITH COMPLICATION (HCC): Primary | ICD-10-CM

## 2022-01-27 DIAGNOSIS — C61 MALIGNANT TUMOR OF PROSTATE (HCC): ICD-10-CM

## 2022-01-27 DIAGNOSIS — G89.29 CHRONIC RIGHT SHOULDER PAIN: ICD-10-CM

## 2022-01-27 DIAGNOSIS — E78.2 MIXED HYPERLIPIDEMIA: ICD-10-CM

## 2022-01-27 DIAGNOSIS — J45.30 MILD PERSISTENT ASTHMA WITHOUT COMPLICATION: ICD-10-CM

## 2022-01-27 DIAGNOSIS — C61 MALIGNANT NEOPLASM OF PROSTATE (HCC): ICD-10-CM

## 2022-01-27 DIAGNOSIS — K21.9 GASTROESOPHAGEAL REFLUX DISEASE WITHOUT ESOPHAGITIS: ICD-10-CM

## 2022-01-27 LAB — HBA1C MFR BLD: 6.6 %

## 2022-01-27 PROCEDURE — 4040F PNEUMOC VAC/ADMIN/RCVD: CPT | Performed by: NURSE PRACTITIONER

## 2022-01-27 PROCEDURE — PBSHW POCT GLYCOSYLATED HEMOGLOBIN (HGB A1C): Performed by: NURSE PRACTITIONER

## 2022-01-27 PROCEDURE — 1123F ACP DISCUSS/DSCN MKR DOCD: CPT | Performed by: NURSE PRACTITIONER

## 2022-01-27 PROCEDURE — 1036F TOBACCO NON-USER: CPT | Performed by: NURSE PRACTITIONER

## 2022-01-27 PROCEDURE — G8484 FLU IMMUNIZE NO ADMIN: HCPCS | Performed by: NURSE PRACTITIONER

## 2022-01-27 PROCEDURE — 99214 OFFICE O/P EST MOD 30 MIN: CPT | Performed by: NURSE PRACTITIONER

## 2022-01-27 PROCEDURE — G8427 DOCREV CUR MEDS BY ELIG CLIN: HCPCS | Performed by: NURSE PRACTITIONER

## 2022-01-27 PROCEDURE — 99213 OFFICE O/P EST LOW 20 MIN: CPT

## 2022-01-27 PROCEDURE — G8417 CALC BMI ABV UP PARAM F/U: HCPCS | Performed by: NURSE PRACTITIONER

## 2022-01-27 PROCEDURE — 83036 HEMOGLOBIN GLYCOSYLATED A1C: CPT | Performed by: NURSE PRACTITIONER

## 2022-01-27 ASSESSMENT — PATIENT HEALTH QUESTIONNAIRE - PHQ9
SUM OF ALL RESPONSES TO PHQ QUESTIONS 1-9: 0
SUM OF ALL RESPONSES TO PHQ QUESTIONS 1-9: 0
2. FEELING DOWN, DEPRESSED OR HOPELESS: 0
SUM OF ALL RESPONSES TO PHQ QUESTIONS 1-9: 0
SUM OF ALL RESPONSES TO PHQ QUESTIONS 1-9: 0
SUM OF ALL RESPONSES TO PHQ9 QUESTIONS 1 & 2: 0
1. LITTLE INTEREST OR PLEASURE IN DOING THINGS: 0

## 2022-01-27 NOTE — PROGRESS NOTES
[address] [address] [address]  Dept: [phone]  Dept Fax: [phone]    Patient:  Jeffrey Hurst  YOB: 1945  Date of Service:  1/27/2022    Chief Complaint   Patient presents with    3 Month Follow-Up     c/o right shoulder pain for last couple months now says has been using a topical rub on location and seems to help symptoms    Gastroesophageal Reflux    Hyperlipidemia    Diabetes     bs ck every couple of days ranges are around 180-190    Hypertension        SUBJECTIVE:     Still having issues with the right shoulder. He is using the otc cream, helps some. Hypertension, diabetes, and hyperlipidemia:  He indicates that he is feeling well and denies any symptoms referable to his elevated blood pressure or diabetes. Specifically denies chest pain, palpitations, dyspnea, peripheral edema, thirst, frequent urination, and blurred vision. Current medication regimen is as listed below. He denies any side effects of medication, and has been compliant, taking it regularly. Home glucose readings have been 130-190. Checks blood sugars daily.  Diabetic complications include: none    Treatment Adherence:   Medication compliance:  compliant most of the time  Diet compliance:  compliant most of the time  Weight trend: stable  Current exercise: no regular exercise  Barriers: lack of motivation    [x]  Hemoglobin A1c has been completed in the last 3-6 months  [x]  To be ordered today    [x]  Urine MicroAlbumin  completed and reviewed within the last 12 month  []  To be ordered today    [x]  Annual eye exam has been completed referring that patient does or does not have diabetic retinopathy  []  Recommendation to schedule and/or referral completed if required    []  Annual diabetic foot exam has been completed in office in the last 12 months  []  To be completed today    The 10-year ASCVD risk score (Martha Kuo et al., 2013) is: 57.7%    Values used to calculate the score:      Age: 76 years      Sex: Male      Is Non- : No      Diabetic: Yes      Tobacco smoker: No      Systolic Blood Pressure: 389 mmHg      Is BP treated: Yes      HDL Cholesterol: 43 mg/dL      Total Cholesterol: 142 mg/dL     BP Readings from Last 3 Encounters:   01/27/22 132/80   12/06/21 132/84   10/20/21 132/78      Pulse Readings from Last 3 Encounters:   01/27/22 76   12/06/21 60   10/20/21 64      Wt Readings from Last 3 Encounters:   01/27/22 207 lb 14.4 oz (94.3 kg)   12/06/21 212 lb 9.6 oz (96.4 kg)   10/20/21 202 lb 9.6 oz (91.9 kg)        Allergies   Allergen Reactions    Crestor [Rosuvastatin]     Meloxicam      Current Outpatient Medications   Medication Sig Dispense Refill    diclofenac sodium (VOLTAREN) 1 % GEL Apply topically 2 times daily      leuprolide acetate, 6 Month, (ELIGARD) 45 MG injection Inject 45 mg into the skin once      doxycycline monohydrate (MONODOX) 100 MG capsule Take 100 mg by mouth daily      diazePAM (VALIUM) 5 MG tablet TAKE 2 TABLETS BY MOUTH IN THE MORNING BEFORE BIOPSY      blood glucose monitor strips 1 strip by Other route 2 times daily Test one to two times a day & as needed for symptoms of irregular blood glucose.  Dispense sufficient amount for indicated testing frequency plus additional to accommodate PRN testing needs for One touch ultra meter 200 strip 0    albuterol sulfate HFA (PROAIR HFA) 108 (90 Base) MCG/ACT inhaler Inhale 2 puffs into the lungs every 4 hours as needed for Wheezing or Shortness of Breath (cough) 1 Inhaler 0    PROAIR  (90 Base) MCG/ACT inhaler USE 2 INHALATIONS EVERY 6 HOURS AS NEEDED FOR WHEEZING 25.5 g 3    DULERA 200-5 MCG/ACT inhaler USE 2 INHALATIONS EVERY 12 HOURS 39 g 3    ONE TOUCH ULTRA TEST strip USE TO TEST BLOOD SUGAR TWICE A  each 3    Multiple Vitamins-Minerals (MULTIVITAMIN & MINERAL PO) Take by mouth      esomeprazole (NEXIUM) 40 MG delayed release capsule TAKE 1 CAPSULE EVERY MORNING BEFORE BREAKFAST 90 capsule 3    metFORMIN (GLUCOPHAGE) 1000 MG tablet TAKE 1 TABLET TWICE A DAY WITH MEALS 180 tablet 3    simvastatin (ZOCOR) 40 MG tablet Take 1 tablet by mouth nightly 90 tablet 3    tamsulosin (FLOMAX) 0.4 MG capsule Take 1 capsule by mouth daily 90 capsule 3    lisinopril (PRINIVIL;ZESTRIL) 20 MG tablet Take 1 tablet by mouth daily 90 tablet 3    SITagliptin (JANUVIA) 100 MG tablet Take 1 tablet by mouth daily 90 tablet 3    DULoxetine (CYMBALTA) 30 MG extended release capsule Take 1 capsule by mouth daily 90 capsule 3     No current facility-administered medications for this visit.       Past Medical History:   Diagnosis Date    Chronic midline low back pain with right-sided sciatica 1/17/2019    GERD (gastroesophageal reflux disease)     Hyperlipidemia     Hypertension     Kidney stone     Melanoma [facility])     Nuclear sclerotic cataract of right eye 11/20/2020    Panniculitis affecting regions of neck and back, lumbar region 7/26/2019    Prostate cancer ([address] 2018    Dr Analilia Nunez ; saturatio biopsies     Prostate cancer ([address] 3/26/2020    Raised prostate specific antigen 6/5/2017    Rheumatoid arthritis ([address]     Right corneal abrasion 10/26/2020    Sepsis ([address] 10/26/2018    Status post laser cataract surgery, left 11/20/2020    Type 2 diabetes mellitus ([address]       Past Surgical History:   Procedure Laterality Date    CARDIAC CATHETERIZATION      CHOLECYSTECTOMY      COLONOSCOPY  2018    Dr Pato Doan ;negative     NERVE SURGERY Left 7/26/2019    Left L4/L5 L5/S1 FACET performed by Marry Ledesma MD at [address] [place] [address] Left 8/13/2019    Left L4/L5 L5/S1 FACET performed by Marry Ledesma MD at [place] [address]  2018    Dr Analilia Nunez      Family History   Problem Relation Age of Onset    Diabetes Mother        REVIEW OF SYSTEMS:     Review of Systems   Constitutional: Negative for chills, diaphoresis, fatigue and fever. HENT: Negative. Eyes: Negative. Respiratory: Negative for cough, shortness of breath and wheezing. Cardiovascular: Negative for chest pain, palpitations and leg swelling. Gastrointestinal: Negative for abdominal pain, constipation, diarrhea and nausea. Endocrine: Negative for cold intolerance, heat intolerance, polydipsia, polyphagia and polyuria. Genitourinary: Negative. Musculoskeletal: Positive for arthralgias (right shoulder). Negative for myalgias. Skin: Negative. Allergic/Immunologic: Negative for environmental allergies. Neurological: Negative for dizziness, light-headedness and headaches. Psychiatric/Behavioral: Negative for agitation, decreased concentration, dysphoric mood, self-injury, sleep disturbance and suicidal ideas. The patient is not nervous/anxious. PHQ Scores 1/27/2022 10/13/2021 4/20/2021 12/15/2020 3/13/2020 9/19/2019 6/14/2018   PHQ2 Score 0 0 0 0 0 0 0   PHQ9 Score 0 0 0 0 0 0 0     Interpretation of Total Score Depression Severity: 1-4 = Minimal depression, 5-9 = Mild depression, 10-14 = Moderate depression, 15-19 = Moderately severe depression, 20-27 = Severe depression     PHYSICAL EXAM:     /80   Pulse 76   Wt 207 lb 14.4 oz (94.3 kg)   SpO2 96%   BMI 31.61 kg/m²    Body mass index is 31.61 kg/m². Physical Exam  Constitutional:       Appearance: Normal appearance. He is well-developed and well-groomed. HENT:      Head: Normocephalic. Nose: Nose normal.      Mouth/Throat:      Mouth: Mucous membranes are moist.   Eyes:      Conjunctiva/sclera: Conjunctivae normal.      Pupils: Pupils are equal, round, and reactive to light. Neck:      Thyroid: No thyromegaly. Vascular: No carotid bruit or JVD. Cardiovascular:      Rate and Rhythm: Normal rate and regular rhythm. Heart sounds: Normal heart sounds. Pulmonary:      Effort: Pulmonary effort is normal. No respiratory distress.       Breath sounds: Normal breath sounds. No wheezing. Abdominal:      Tenderness: There is no abdominal tenderness. Musculoskeletal:      Cervical back: Neck supple. Right lower leg: No edema. Left lower leg: No edema. Lymphadenopathy:      Cervical: No cervical adenopathy. Skin:     Capillary Refill: Capillary refill takes less than 2 seconds. Neurological:      Mental Status: He is alert and oriented to person, place, and time. Psychiatric:         Mood and Affect: Mood normal.         Behavior: Behavior is cooperative. ASSESSMENT /PLAN   1. Type 2 diabetes mellitus with complication [facility])  Assessment & Plan:   Well-controlled, continue current medications     Lab Results   Component Value Date    LABA1C 6.6 01/27/2022     Lab Results   Component Value Date     11/09/2020     Orders:  -     POCT glycosylated hemoglobin (Hb A1C)  2. Essential hypertension  Assessment & Plan:   Well-controlled, continue current medications  3. Rheumatoid arthritis, involving unspecified site, unspecified whether rheumatoid factor present ([facility]  4. Malignant neoplasm of prostate ([facility]  5. Chronic right shoulder pain  -     XR SHOULDER RIGHT (MIN 2 VIEWS)  6. Wheezing  7. Malignant tumor of prostate [facility])  Assessment & Plan:   Monitored by specialist- no acute findings meriting change in the plan. 8. Mixed hyperlipidemia  9. Mild persistent asthma without complication  Assessment & Plan:   Well-controlled, continue current medications  10. Gastroesophageal reflux disease without esophagitis  Assessment & Plan:   Well-controlled, continue current medications        Return in about 6 months (around 7/27/2022) for DM, HTN, HLD. All patient questions answered. Patient voiced understanding. Health Maintenance reviewed. Instructed to continue current medications. Patient agreed with treatment plan. Follow up as directed. Please note that this chart was generated using voice recognition Dragon dictation software. Although every effort was made to ensure the accuracy of this automated transcription, some errors in transcription may have occurred.     Electronically signed by EDMOND Monroe CNP on 2/7/2022

## 2022-01-28 DIAGNOSIS — M54.41 ACUTE BILATERAL LOW BACK PAIN WITH BILATERAL SCIATICA: ICD-10-CM

## 2022-01-28 DIAGNOSIS — K21.9 GASTROESOPHAGEAL REFLUX DISEASE WITHOUT ESOPHAGITIS: ICD-10-CM

## 2022-01-28 DIAGNOSIS — E78.5 HYPERLIPIDEMIA, UNSPECIFIED HYPERLIPIDEMIA TYPE: ICD-10-CM

## 2022-01-28 DIAGNOSIS — I10 ESSENTIAL HYPERTENSION: ICD-10-CM

## 2022-01-28 DIAGNOSIS — E11.9 TYPE 2 DIABETES MELLITUS WITHOUT COMPLICATION, WITHOUT LONG-TERM CURRENT USE OF INSULIN (HCC): ICD-10-CM

## 2022-01-28 DIAGNOSIS — E11.8 TYPE 2 DIABETES MELLITUS WITH COMPLICATION (HCC): ICD-10-CM

## 2022-01-28 DIAGNOSIS — M54.42 ACUTE BILATERAL LOW BACK PAIN WITH BILATERAL SCIATICA: ICD-10-CM

## 2022-01-28 RX ORDER — DULOXETIN HYDROCHLORIDE 30 MG/1
30 CAPSULE, DELAYED RELEASE ORAL DAILY
Qty: 90 CAPSULE | Refills: 3 | Status: SHIPPED | OUTPATIENT
Start: 2022-01-28

## 2022-01-28 RX ORDER — SIMVASTATIN 40 MG
40 TABLET ORAL NIGHTLY
Qty: 90 TABLET | Refills: 3 | Status: SHIPPED | OUTPATIENT
Start: 2022-01-28

## 2022-01-28 RX ORDER — ESOMEPRAZOLE MAGNESIUM 40 MG/1
CAPSULE, DELAYED RELEASE ORAL
Qty: 90 CAPSULE | Refills: 3 | Status: SHIPPED | OUTPATIENT
Start: 2022-01-28

## 2022-01-28 RX ORDER — LISINOPRIL 20 MG/1
20 TABLET ORAL DAILY
Qty: 90 TABLET | Refills: 3 | Status: SHIPPED | OUTPATIENT
Start: 2022-01-28

## 2022-01-28 RX ORDER — TAMSULOSIN HYDROCHLORIDE 0.4 MG/1
0.4 CAPSULE ORAL DAILY
Qty: 90 CAPSULE | Refills: 3 | Status: SHIPPED | OUTPATIENT
Start: 2022-01-28

## 2022-01-28 NOTE — TELEPHONE ENCOUNTER
Yolanda Hopkinsness is requesting a refill on the following medication(s):  Requested Prescriptions     Pending Prescriptions Disp Refills    esomeprazole (NEXIUM) 40 MG delayed release capsule 90 capsule 3     Sig: TAKE 1 CAPSULE EVERY MORNING BEFORE BREAKFAST    metFORMIN (GLUCOPHAGE) 1000 MG tablet 180 tablet 3     Sig: TAKE 1 TABLET TWICE A DAY WITH MEALS    simvastatin (ZOCOR) 40 MG tablet 90 tablet 3     Sig: Take 1 tablet by mouth nightly    tamsulosin (FLOMAX) 0.4 MG capsule 90 capsule 3     Sig: Take 1 capsule by mouth daily    lisinopril (PRINIVIL;ZESTRIL) 20 MG tablet 90 tablet 3     Sig: Take 1 tablet by mouth daily    SITagliptin (JANUVIA) 100 MG tablet 90 tablet 3     Sig: Take 1 tablet by mouth daily    DULoxetine (CYMBALTA) 30 MG extended release capsule 90 capsule 3     Sig: Take 1 capsule by mouth daily       Last Visit Date (If Applicable):  1/58/1720    Next Visit Date:    Visit date not found

## 2022-02-07 PROBLEM — G89.29 CHRONIC RIGHT SHOULDER PAIN: Status: ACTIVE | Noted: 2022-02-07

## 2022-02-07 PROBLEM — Z98.42: Status: RESOLVED | Noted: 2020-11-20 | Resolved: 2022-02-07

## 2022-02-07 PROBLEM — H25.11 NUCLEAR SCLEROTIC CATARACT OF RIGHT EYE: Status: RESOLVED | Noted: 2020-11-20 | Resolved: 2022-02-07

## 2022-02-07 PROBLEM — M54.06 PANNICULITIS AFFECTING REGIONS OF NECK AND BACK, LUMBAR REGION: Status: RESOLVED | Noted: 2019-07-26 | Resolved: 2022-02-07

## 2022-02-07 PROBLEM — M25.511 CHRONIC RIGHT SHOULDER PAIN: Status: ACTIVE | Noted: 2022-02-07

## 2022-02-07 PROBLEM — D03.8: Status: RESOLVED | Noted: 2017-06-05 | Resolved: 2022-02-07

## 2022-02-07 PROBLEM — F52.21 MALE ERECTILE DISORDER (CODE): Status: RESOLVED | Noted: 2017-06-05 | Resolved: 2022-02-07

## 2022-02-07 PROBLEM — S05.01XA RIGHT CORNEAL ABRASION: Status: RESOLVED | Noted: 2020-10-26 | Resolved: 2022-02-07

## 2022-02-07 PROBLEM — G89.29 CHRONIC MIDLINE LOW BACK PAIN WITH RIGHT-SIDED SCIATICA: Status: RESOLVED | Noted: 2019-01-17 | Resolved: 2022-02-07

## 2022-02-07 PROBLEM — G93.89 OTHER SPECIFIED DISORDERS OF BRAIN: Status: RESOLVED | Noted: 2017-06-05 | Resolved: 2022-02-07

## 2022-02-07 PROBLEM — N13.30 HYDRONEPHROSIS: Status: RESOLVED | Noted: 2020-12-14 | Resolved: 2022-02-07

## 2022-02-07 PROBLEM — M54.41 CHRONIC MIDLINE LOW BACK PAIN WITH RIGHT-SIDED SCIATICA: Status: RESOLVED | Noted: 2019-01-17 | Resolved: 2022-02-07

## 2022-02-07 PROBLEM — R97.20 RAISED PROSTATE SPECIFIC ANTIGEN: Status: RESOLVED | Noted: 2017-06-05 | Resolved: 2022-02-07

## 2022-02-07 ASSESSMENT — ENCOUNTER SYMPTOMS
NAUSEA: 0
COUGH: 0
ABDOMINAL PAIN: 0
EYES NEGATIVE: 1
DIARRHEA: 0
WHEEZING: 0
SHORTNESS OF BREATH: 0
CONSTIPATION: 0

## 2022-02-08 NOTE — ASSESSMENT & PLAN NOTE
Well-controlled, continue current medications     Lab Results   Component Value Date    LABA1C 6.6 01/27/2022     Lab Results   Component Value Date     11/09/2020

## 2022-02-14 ENCOUNTER — OFFICE VISIT (OUTPATIENT)
Dept: FAMILY MEDICINE CLINIC | Age: 77
End: 2022-02-14
Payer: MEDICARE

## 2022-02-14 VITALS — HEART RATE: 64 BPM | DIASTOLIC BLOOD PRESSURE: 74 MMHG | SYSTOLIC BLOOD PRESSURE: 134 MMHG | OXYGEN SATURATION: 99 %

## 2022-02-14 DIAGNOSIS — G89.29 CHRONIC RIGHT SHOULDER PAIN: Primary | ICD-10-CM

## 2022-02-14 DIAGNOSIS — M25.511 CHRONIC RIGHT SHOULDER PAIN: Primary | ICD-10-CM

## 2022-02-14 DIAGNOSIS — M19.011 ARTHRITIS OF RIGHT SHOULDER REGION: ICD-10-CM

## 2022-02-14 PROCEDURE — 1036F TOBACCO NON-USER: CPT | Performed by: NURSE PRACTITIONER

## 2022-02-14 PROCEDURE — G8417 CALC BMI ABV UP PARAM F/U: HCPCS | Performed by: NURSE PRACTITIONER

## 2022-02-14 PROCEDURE — 99212 OFFICE O/P EST SF 10 MIN: CPT

## 2022-02-14 PROCEDURE — G8484 FLU IMMUNIZE NO ADMIN: HCPCS | Performed by: NURSE PRACTITIONER

## 2022-02-14 PROCEDURE — 4040F PNEUMOC VAC/ADMIN/RCVD: CPT | Performed by: NURSE PRACTITIONER

## 2022-02-14 PROCEDURE — G8427 DOCREV CUR MEDS BY ELIG CLIN: HCPCS | Performed by: NURSE PRACTITIONER

## 2022-02-14 PROCEDURE — 1123F ACP DISCUSS/DSCN MKR DOCD: CPT | Performed by: NURSE PRACTITIONER

## 2022-02-14 PROCEDURE — 99213 OFFICE O/P EST LOW 20 MIN: CPT | Performed by: NURSE PRACTITIONER

## 2022-02-14 RX ORDER — TRAMADOL HYDROCHLORIDE 50 MG/1
50 TABLET ORAL EVERY 6 HOURS PRN
Qty: 28 TABLET | Refills: 0 | Status: SHIPPED | OUTPATIENT
Start: 2022-02-14 | End: 2022-02-21

## 2022-02-14 NOTE — PROGRESS NOTES
[address] [address] [address]  Dept: [phone]  Dept Fax: [phone]    History and Physical  Patient:  Ada Aragon  YOB: 1945  Date of Service:  2022    Subjective:   Ada Aragon (:  1945) is a 76 y.o. male, Established patient, here for evaluation of the following chief complaint(s):    Chief Complaint   Patient presents with    Shoulder Pain     c/o right shoulder pain getting worse would like to discuss options for pain relief, unable to sleep at night and using topical ointment three times a day so that shoulder is tolerable      Shoulder Pain   The pain is present in the right shoulder. This is a new problem. The current episode started 2 months ago. There has been no history of extremity trauma. The problem occurs intermittently. The problem has been worsening. The quality of the pain is described as aching. The pain is rated 8/10. Pertinent negatives include no fever, limited range of motion, numbness or tingling. The symptoms are aggravated by activity. He has tried acetaminophen and Voltaren gel for the symptoms. The treatment provided mild relief. His past medical history is significant for diabetes, osteoarthritis and rheumatoid arthritis. X-ray right shoulder completed 2022:  Moderate arthritis    The 10-year ASCVD risk score (Nayely Hampton, et al., 2013) is: 58.7%    Values used to calculate the score:      Age: 76 years      Sex: Male      Is Non- : No      Diabetic: Yes      Tobacco smoker: No      Systolic Blood Pressure: 851 mmHg      Is BP treated: Yes      HDL Cholesterol: 43 mg/dL      Total Cholesterol: 142 mg/dL     BP Readings from Last 3 Encounters:   22 134/74   22 132/80   21 132/84      Pulse Readings from Last 3 Encounters:   22 64   22 76   21 60      Wt Readings from Last 3 Encounters:   22 207 lb 14.4 oz (94.3 kg)   12/06/21 212 lb 9.6 oz (96.4 kg)   10/20/21 202 lb 9.6 oz (91.9 kg)        Allergies   Allergen Reactions    Crestor [Rosuvastatin]     Meloxicam        Current Outpatient Medications   Medication Sig Dispense Refill    traMADol (ULTRAM) 50 MG tablet Take 1 tablet by mouth every 6 hours as needed for Pain for up to 7 days. Intended supply: 7 days. Take lowest dose possible to manage pain 28 tablet 0    esomeprazole (NEXIUM) 40 MG delayed release capsule TAKE 1 CAPSULE EVERY MORNING BEFORE BREAKFAST 90 capsule 3    metFORMIN (GLUCOPHAGE) 1000 MG tablet TAKE 1 TABLET TWICE A DAY WITH MEALS 180 tablet 3    simvastatin (ZOCOR) 40 MG tablet Take 1 tablet by mouth nightly 90 tablet 3    tamsulosin (FLOMAX) 0.4 MG capsule Take 1 capsule by mouth daily 90 capsule 3    lisinopril (PRINIVIL;ZESTRIL) 20 MG tablet Take 1 tablet by mouth daily 90 tablet 3    SITagliptin (JANUVIA) 100 MG tablet Take 1 tablet by mouth daily 90 tablet 3    DULoxetine (CYMBALTA) 30 MG extended release capsule Take 1 capsule by mouth daily 90 capsule 3    diclofenac sodium (VOLTAREN) 1 % GEL Apply topically 2 times daily      leuprolide acetate, 6 Month, (ELIGARD) 45 MG injection Inject 45 mg into the skin once      doxycycline monohydrate (MONODOX) 100 MG capsule Take 100 mg by mouth daily      diazePAM (VALIUM) 5 MG tablet TAKE 2 TABLETS BY MOUTH IN THE MORNING BEFORE BIOPSY      blood glucose monitor strips 1 strip by Other route 2 times daily Test one to two times a day & as needed for symptoms of irregular blood glucose.  Dispense sufficient amount for indicated testing frequency plus additional to accommodate PRN testing needs for One touch ultra meter 200 strip 0    albuterol sulfate HFA (PROAIR HFA) 108 (90 Base) MCG/ACT inhaler Inhale 2 puffs into the lungs every 4 hours as needed for Wheezing or Shortness of Breath (cough) 1 Inhaler 0    PROAIR  (90 Base) MCG/ACT inhaler USE 2 INHALATIONS EVERY 6 HOURS AS NEEDED FOR WHEEZING 25.5 g 3    DULERA 200-5 MCG/ACT inhaler USE 2 INHALATIONS EVERY 12 HOURS 39 g 3    ONE TOUCH ULTRA TEST strip USE TO TEST BLOOD SUGAR TWICE A  each 3    Multiple Vitamins-Minerals (MULTIVITAMIN & MINERAL PO) Take by mouth       No current facility-administered medications for this visit. Past Medical History:   Diagnosis Date    Chronic midline low back pain with right-sided sciatica 2019    GERD (gastroesophageal reflux disease)     Hyperlipidemia     Hypertension     Kidney stone     Melanoma [facility])     Nuclear sclerotic cataract of right eye 2020    Panniculitis affecting regions of neck and back, lumbar region 2019    Prostate cancer ([facility] 2018    Dr Allie Holm ; saturatio biopsies     Prostate cancer ([facility] 3/26/2020    Raised prostate specific antigen 2017    Rheumatoid arthritis ([facility]     Right corneal abrasion 10/26/2020    Sepsis ([address] 10/26/2018    Status post laser cataract surgery, left 2020    Type 2 diabetes mellitus ([facility]        Past Surgical History:   Procedure Laterality Date    CARDIAC CATHETERIZATION      CHOLECYSTECTOMY      COLONOSCOPY      Dr Gema Mckeon ;negative     NERVE SURGERY Left 2019    Left L4/L5 L5/S1 FACET performed by Lorrie Spears MD at [address] [place] [address] Left 2019    Left L4/L5 L5/S1 FACET performed by Lorrie Spears MD at [place] [address]      Dr Allie Holm      Family History   Problem Relation Age of Onset    Diabetes Mother      Social History     Tobacco Use    Smoking status: Former Smoker     Packs/day: 1.00     Years: 45.00     Pack years: 45.00     Quit date:      Years since quittin.1    Smokeless tobacco: Never Used    Tobacco comment: loc dover,10/26/2018   Vaping Use    Vaping Use: Never used   Substance Use Topics    Alcohol use:  Yes     Alcohol/week: 1.0 standard drink     Types: 1 Cans of beer per week    Drug use: No       Review of Systems:     Review of Systems   Constitutional: Negative for appetite change, fatigue and fever. Respiratory: Negative. Cardiovascular: Negative. Musculoskeletal: Positive for arthralgias (right shoulder). Neurological: Negative. Psychiatric/Behavioral: Positive for sleep disturbance. PHQ Scores 1/27/2022 10/13/2021 4/20/2021 12/15/2020 3/13/2020 9/19/2019 6/14/2018   PHQ2 Score 0 0 0 0 0 0 0   PHQ9 Score 0 0 0 0 0 0 0     Interpretation of Total Score Depression Severity: 1-4 = Minimal depression, 5-9 = Mild depression, 10-14 = Moderate depression, 15-19 = Moderately severe depression, 20-27 = Severe depression     Physical Exam:     Vitals:    02/14/22 1559   BP: 134/74   Pulse: 64   SpO2: 99%      There is no height or weight on file to calculate BMI. Physical Exam  Constitutional:       Appearance: Normal appearance. HENT:      Head: Normocephalic. Cardiovascular:      Rate and Rhythm: Normal rate and regular rhythm. Heart sounds: Normal heart sounds. Pulmonary:      Effort: Pulmonary effort is normal.      Breath sounds: Normal breath sounds. Musculoskeletal:      Right shoulder: Tenderness present. Normal range of motion (full ROM with discomfort). Normal strength. Left shoulder: Normal.      Cervical back: Neck supple. Neurological:      Mental Status: He is alert and oriented to person, place, and time. Assessment/Plan:   1. Chronic right shoulder pain  -     External Referral To Orthopedic Surgery  -     traMADol (ULTRAM) 50 MG tablet; Take 1 tablet by mouth every 6 hours as needed for Pain for up to 7 days. Intended supply: 7 days. Take lowest dose possible to manage pain, Disp-28 tablet, R-0Normal  2. Arthritis of right shoulder region  -     External Referral To Orthopedic Surgery  -     traMADol (ULTRAM) 50 MG tablet; Take 1 tablet by mouth every 6 hours as needed for Pain for up to 7 days. Intended supply: 7 days.  Take lowest dose possible to manage pain, Disp-28 tablet, R-0Normal        All patient questions answered. Patient voiced understanding. Instructed to continue current medications. Patient agreed with treatment plan. Follow up as directed. No follow-ups on file. Please note that this chart was generated using voice recognition Dragon dictation software. Although every effort was made to ensure the accuracy of this automated transcription, some errors in transcription may have occurred.     Electronically signed by EDMOND Armstrong CNP on 2/20/2022

## 2022-02-20 ASSESSMENT — ENCOUNTER SYMPTOMS: RESPIRATORY NEGATIVE: 1

## 2022-06-02 LAB — DIAGNOSTIC PSA: < 0.06 NG/ML (ref 0–4)

## 2022-10-25 ENCOUNTER — OFFICE VISIT (OUTPATIENT)
Dept: FAMILY MEDICINE CLINIC | Age: 77
End: 2022-10-25
Payer: MEDICARE

## 2022-10-25 VITALS
HEART RATE: 85 BPM | HEIGHT: 68 IN | WEIGHT: 207 LBS | OXYGEN SATURATION: 96 % | RESPIRATION RATE: 16 BRPM | SYSTOLIC BLOOD PRESSURE: 142 MMHG | TEMPERATURE: 99.1 F | BODY MASS INDEX: 31.37 KG/M2 | DIASTOLIC BLOOD PRESSURE: 80 MMHG

## 2022-10-25 DIAGNOSIS — E11.8 TYPE 2 DIABETES MELLITUS WITH COMPLICATION (HCC): ICD-10-CM

## 2022-10-25 DIAGNOSIS — J40 BRONCHITIS: Primary | ICD-10-CM

## 2022-10-25 PROCEDURE — 99211 OFF/OP EST MAY X REQ PHY/QHP: CPT | Performed by: NURSE PRACTITIONER

## 2022-10-25 PROCEDURE — 3044F HG A1C LEVEL LT 7.0%: CPT | Performed by: NURSE PRACTITIONER

## 2022-10-25 PROCEDURE — G8417 CALC BMI ABV UP PARAM F/U: HCPCS | Performed by: NURSE PRACTITIONER

## 2022-10-25 PROCEDURE — 3074F SYST BP LT 130 MM HG: CPT | Performed by: NURSE PRACTITIONER

## 2022-10-25 PROCEDURE — 1123F ACP DISCUSS/DSCN MKR DOCD: CPT | Performed by: NURSE PRACTITIONER

## 2022-10-25 PROCEDURE — 1036F TOBACCO NON-USER: CPT | Performed by: NURSE PRACTITIONER

## 2022-10-25 PROCEDURE — G8427 DOCREV CUR MEDS BY ELIG CLIN: HCPCS | Performed by: NURSE PRACTITIONER

## 2022-10-25 PROCEDURE — 3078F DIAST BP <80 MM HG: CPT | Performed by: NURSE PRACTITIONER

## 2022-10-25 PROCEDURE — 99214 OFFICE O/P EST MOD 30 MIN: CPT | Performed by: NURSE PRACTITIONER

## 2022-10-25 PROCEDURE — G8484 FLU IMMUNIZE NO ADMIN: HCPCS | Performed by: NURSE PRACTITIONER

## 2022-10-25 RX ORDER — AZITHROMYCIN 250 MG/1
TABLET, FILM COATED ORAL
Qty: 1 PACKET | Refills: 0 | Status: SHIPPED | OUTPATIENT
Start: 2022-10-25 | End: 2022-10-30

## 2022-10-25 SDOH — ECONOMIC STABILITY: FOOD INSECURITY: WITHIN THE PAST 12 MONTHS, YOU WORRIED THAT YOUR FOOD WOULD RUN OUT BEFORE YOU GOT MONEY TO BUY MORE.: NEVER TRUE

## 2022-10-25 SDOH — ECONOMIC STABILITY: FOOD INSECURITY: WITHIN THE PAST 12 MONTHS, THE FOOD YOU BOUGHT JUST DIDN'T LAST AND YOU DIDN'T HAVE MONEY TO GET MORE.: NEVER TRUE

## 2022-10-25 ASSESSMENT — ENCOUNTER SYMPTOMS
RHINORRHEA: 0
COUGH: 1
SHORTNESS OF BREATH: 0
WHEEZING: 1
SORE THROAT: 0

## 2022-10-25 ASSESSMENT — SOCIAL DETERMINANTS OF HEALTH (SDOH): HOW HARD IS IT FOR YOU TO PAY FOR THE VERY BASICS LIKE FOOD, HOUSING, MEDICAL CARE, AND HEATING?: NOT HARD AT ALL

## 2022-10-25 NOTE — PATIENT INSTRUCTIONS
Zithromax as directed for infection. Januvia samples given. Follow up with primary care provider in 1 to 2 days if needed.

## 2022-10-25 NOTE — PROGRESS NOTES
[address] [place] [address] In [address] [facility], APRN-CNP  [address]  Phone:  [phone]  Fax:  [phone]  Sumeet Franz is a 76 y.o. male who presents today for his medical conditions/complaints as noted below. Sumeet Sidhuacher c/o of Cough (Pt presents to the walk in with cough and congestion. Coughing mucus up. Denies fever. Pt tested for Covid 2x his last time was this morning and it was negative. These symptoms have been going on for about 1 week. He has been treating his symptoms with mucinex, tylenol pm. )      HPI:     Cough  This is a new problem. The current episode started 1 to 4 weeks ago. The problem has been unchanged. The cough is Productive of sputum (gray). Associated symptoms include wheezing. Pertinent negatives include no chills, fever, headaches, rhinorrhea, sore throat or shortness of breath. Patient has had 2 negative COVID test at home in the past few days. Diabetes controlled with Januvia.   Wt Readings from Last 3 Encounters:   10/25/22 207 lb (93.9 kg)   01/27/22 207 lb 14.4 oz (94.3 kg)   12/06/21 212 lb 9.6 oz (96.4 kg)       Temp Readings from Last 3 Encounters:   10/25/22 99.1 °F (37.3 °C)   03/13/20 98.2 °F (36.8 °C)   12/11/19 98.8 °F (37.1 °C) (Tympanic)       BP Readings from Last 3 Encounters:   10/25/22 (!) 142/80   02/14/22 134/74   01/27/22 132/80       Pulse Readings from Last 3 Encounters:   10/25/22 85   02/14/22 64   01/27/22 76        SpO2 Readings from Last 3 Encounters:   10/25/22 96%   02/14/22 99%   01/27/22 96%             Past Medical History:   Diagnosis Date    Chronic midline low back pain with right-sided sciatica 1/17/2019    GERD (gastroesophageal reflux disease)     Hyperlipidemia     Hypertension     Kidney stone     Melanoma ([facility]     Nuclear sclerotic cataract of right eye 11/20/2020    Panniculitis affecting regions of neck and back, lumbar region 7/26/2019    [facility]    [facility]     Prostate cancer ([facility] 3/26/2020    Raised prostate specific antigen 2017    Rheumatoid arthritis ([facility]     Right corneal abrasion 10/26/2020    [facility] 10/26/2018    Status post laser cataract surgery, left 2020    Type 2 diabetes mellitus [facility])       Past Surgical History:   Procedure Laterality Date    CARDIAC CATHETERIZATION      CHOLECYSTECTOMY      COLONOSCOPY      Dr Arnie Wise ;negative     NERVE SURGERY Left 2019    Left L4/L5 L5/S1 FACET performed by Sp Smalls MD at [facility] Left 2019    Left L4/L5 L5/S1 FACET performed by Sp Smalls MD at [address] [place] [address]      Dr Maryjo Rivas      Family History   Problem Relation Age of Onset    Diabetes Mother      Social History     Tobacco Use    Smoking status: Former     Packs/day: 1.00     Years: 45.00     Pack years: 45.00     Types: Cigarettes     Quit date:      Years since quittin.8    Smokeless tobacco: Never    Tobacco comments:     loc dover,10/26/2018   Substance Use Topics    Alcohol use: Yes     Alcohol/week: 1.0 standard drink     Types: 1 Cans of beer per week      Current Outpatient Medications   Medication Sig Dispense Refill    azithromycin (ZITHROMAX Z-DEBBIE) 250 MG tablet Two pills daily first day, then one pill daily for 4 days. Take with food. 1 packet 0    esomeprazole (NEXIUM) 40 MG delayed release capsule TAKE 1 CAPSULE EVERY MORNING BEFORE BREAKFAST 90 capsule 3    metFORMIN (GLUCOPHAGE) 1000 MG tablet TAKE 1 TABLET TWICE A DAY WITH MEALS 180 tablet 3    simvastatin (ZOCOR) 40 MG tablet Take 1 tablet by mouth nightly 90 tablet 3    lisinopril (PRINIVIL;ZESTRIL) 20 MG tablet Take 1 tablet by mouth daily 90 tablet 3    SITagliptin (JANUVIA) 100 MG tablet Take 1 tablet by mouth daily 90 tablet 3    blood glucose monitor strips 1 strip by Other route 2 times daily Test one to two times a day & as needed for symptoms of irregular blood glucose.  Dispense sufficient amount for indicated testing frequency plus additional to accommodate PRN testing needs for One touch ultra meter 200 strip 0    albuterol sulfate HFA (PROAIR HFA) 108 (90 Base) MCG/ACT inhaler Inhale 2 puffs into the lungs every 4 hours as needed for Wheezing or Shortness of Breath (cough) 1 Inhaler 0    PROAIR  (90 Base) MCG/ACT inhaler USE 2 INHALATIONS EVERY 6 HOURS AS NEEDED FOR WHEEZING 25.5 g 3    DULERA 200-5 MCG/ACT inhaler USE 2 INHALATIONS EVERY 12 HOURS 39 g 3    Multiple Vitamins-Minerals (MULTIVITAMIN & MINERAL PO) Take by mouth      tamsulosin (FLOMAX) 0.4 MG capsule Take 1 capsule by mouth daily (Patient not taking: Reported on 10/25/2022) 90 capsule 3    DULoxetine (CYMBALTA) 30 MG extended release capsule Take 1 capsule by mouth daily (Patient not taking: Reported on 10/25/2022) 90 capsule 3    diclofenac sodium (VOLTAREN) 1 % GEL Apply topically 2 times daily (Patient not taking: Reported on 10/25/2022)      ONE TOUCH ULTRA TEST strip USE TO TEST BLOOD SUGAR TWICE A DAY (Patient not taking: Reported on 10/25/2022) 200 each 3     No current facility-administered medications for this visit. Allergies   Allergen Reactions    Crestor [Rosuvastatin]     Meloxicam        No results found. Subjective:      Review of Systems   Constitutional:  Positive for fatigue. Negative for chills, diaphoresis and fever. HENT:  Negative for congestion, rhinorrhea and sore throat. Respiratory:  Positive for cough and wheezing. Negative for shortness of breath. Neurological:  Negative for headaches. Objective:     BP (!) 142/80 (Site: Right Upper Arm, Position: Sitting, Cuff Size: Medium Adult)   Pulse 85   Temp 99.1 °F (37.3 °C)   Resp 16   Ht 5' 8\" (1.727 m)   Wt 207 lb (93.9 kg)   SpO2 96%   BMI 31.47 kg/m²     Physical Exam  Constitutional:       General: He is not in acute distress. Appearance: He is well-developed. He is not ill-appearing, toxic-appearing or diaphoretic. HENT:      Head: Normocephalic. Right Ear: Tympanic membrane, ear canal and external ear normal.      Left Ear: Tympanic membrane, ear canal and external ear normal.      Nose: Nose normal. No mucosal edema, congestion or rhinorrhea. Mouth/Throat:      Mouth: Mucous membranes are moist. Mucous membranes are not pale and not dry. Pharynx: Oropharynx is clear. Eyes:      General: Lids are normal. No scleral icterus. Right eye: No discharge. Left eye: No discharge. Extraocular Movements:      Right eye: No nystagmus. Left eye: No nystagmus. Conjunctiva/sclera: Conjunctivae normal.   Neck:      Trachea: Trachea normal.   Cardiovascular:      Rate and Rhythm: Normal rate and regular rhythm. Pulses: Normal pulses. Heart sounds: Normal heart sounds. Pulmonary:      Effort: Pulmonary effort is normal. No accessory muscle usage or respiratory distress. Breath sounds: Rhonchi present. Comments: Congested cough. Productive green phlegm. Musculoskeletal:         General: Normal range of motion. Cervical back: Full passive range of motion without pain and normal range of motion. Skin:     General: Skin is warm and dry. Capillary Refill: Capillary refill takes less than 2 seconds. Coloration: Skin is not pale. Neurological:      Mental Status: He is alert and oriented to person, place, and time. Psychiatric:         Mood and Affect: Mood normal.         Speech: Speech normal.         Behavior: Behavior normal.         Thought Content: Thought content normal.         Judgment: Judgment normal.       Assessment:      Diagnosis Orders   1. Bronchitis  azithromycin (ZITHROMAX Z-DEBBIE) 250 MG tablet      2. Type 2 diabetes mellitus with [facility]          No results found for this visit on 10/25/22. Plan:       Zithromax as directed for infection. Januvia samples given.     Follow up with primary care provider in 1 to 2 days if needed. Patient/Caregiver instructed on use, benefit, and side effects of prescribed medications. All patient/parent/caregiver questions answered. Patient/parent/caregiver voiced understanding. Reviewed health maintenance. Instructed to continue current medications, diet and exercise. Patient agreed with treatment plan. Follow up as directed.            Electronically signed by EDMOND Quintanilla NP on10/25/2022

## 2022-11-18 ENCOUNTER — TELEPHONE (OUTPATIENT)
Dept: FAMILY MEDICINE CLINIC | Age: 77
End: 2022-11-18

## 2022-11-19 NOTE — TELEPHONE ENCOUNTER
Received text from [id number] service regarding patient complaining of abdominal pain since 4 pm this afternoon. Spoke with Charles Ordoñez (significant other), she reports patient was taking a nap and woke with sudden onset of right upper abdominal pain. The pain has been unchanged, but he has started vomiting. Instructed to go to the ED for further immediate evaluation. Charles Ordoñez verbalized understanding, but states he has already refused to go. She will continue to talk with him and get to the ED.

## 2022-11-28 ENCOUNTER — OFFICE VISIT (OUTPATIENT)
Dept: FAMILY MEDICINE CLINIC | Age: 77
End: 2022-11-28
Payer: MEDICARE

## 2022-11-28 VITALS
HEART RATE: 54 BPM | SYSTOLIC BLOOD PRESSURE: 138 MMHG | WEIGHT: 209 LBS | BODY MASS INDEX: 31.78 KG/M2 | DIASTOLIC BLOOD PRESSURE: 74 MMHG | OXYGEN SATURATION: 97 %

## 2022-11-28 DIAGNOSIS — G89.29 CHRONIC RIGHT SHOULDER PAIN: ICD-10-CM

## 2022-11-28 DIAGNOSIS — E11.9 TYPE 2 DIABETES MELLITUS WITHOUT COMPLICATION, WITHOUT LONG-TERM CURRENT USE OF INSULIN (HCC): Primary | ICD-10-CM

## 2022-11-28 DIAGNOSIS — C61 MALIGNANT TUMOR OF PROSTATE (HCC): ICD-10-CM

## 2022-11-28 DIAGNOSIS — R10.11 RUQ ABDOMINAL PAIN: ICD-10-CM

## 2022-11-28 DIAGNOSIS — E78.2 MIXED HYPERLIPIDEMIA: ICD-10-CM

## 2022-11-28 DIAGNOSIS — I10 ESSENTIAL HYPERTENSION: ICD-10-CM

## 2022-11-28 DIAGNOSIS — M25.511 CHRONIC RIGHT SHOULDER PAIN: ICD-10-CM

## 2022-11-28 DIAGNOSIS — M06.9 RHEUMATOID ARTHRITIS, INVOLVING UNSPECIFIED SITE, UNSPECIFIED WHETHER RHEUMATOID FACTOR PRESENT (HCC): ICD-10-CM

## 2022-11-28 DIAGNOSIS — J45.30 MILD PERSISTENT ASTHMA WITHOUT COMPLICATION: ICD-10-CM

## 2022-11-28 DIAGNOSIS — H35.3231 EXUDATIVE AGE-RELATED MACULAR DEGENERATION OF BOTH EYES WITH ACTIVE CHOROIDAL NEOVASCULARIZATION (HCC): ICD-10-CM

## 2022-11-28 DIAGNOSIS — K21.9 GASTROESOPHAGEAL REFLUX DISEASE WITHOUT ESOPHAGITIS: ICD-10-CM

## 2022-11-28 DIAGNOSIS — H25.811 COMBINED FORMS OF AGE-RELATED CATARACT OF RIGHT EYE: ICD-10-CM

## 2022-11-28 LAB
ALBUMIN/GLOBULIN RATIO: 1.2 G/DL
ALBUMIN: 3.6 G/DL (ref 3.5–5)
ALP BLD-CCNC: 68 UNITS/L (ref 38–126)
ALT SERPL-CCNC: 19 UNITS/L (ref 4–50)
ANION GAP SERPL CALCULATED.3IONS-SCNC: 7.6 MMOL/L
AST SERPL-CCNC: 26 UNITS/L (ref 17–59)
BASOPHILS %: 2.2 (ref 0–3)
BASOPHILS ABSOLUTE: 0.07 (ref 0–0.3)
BILIRUB SERPL-MCNC: 0.6 MG/DL (ref 0.2–1.3)
BUN BLDV-MCNC: 12 MG/DL (ref 9–20)
CALCIUM SERPL-MCNC: 8.9 MG/DL (ref 8.4–10.2)
CHLORIDE BLD-SCNC: 104 MMOL/L (ref 98–120)
CHOLESTEROL/HDL RATIO: 3.1 RATIO (ref 0–4.5)
CHOLESTEROL: 152 MG/DL (ref 50–200)
CO2: 29 MMOL/L (ref 22–31)
CREAT SERPL-MCNC: 1.2 MG/DL (ref 0.7–1.3)
CREATININE, RANDOM URINE: 117.7 MG/DL (ref 20–370)
EOSINOPHILS %: 3.7 (ref 0–10)
EOSINOPHILS ABSOLUTE: 0.12 (ref 0–1.1)
GFR CALCULATED: > 60
GLOBULIN: 3 G/DL
GLUCOSE: 176 MG/DL (ref 75–110)
HBA1C MFR BLD: 7.2 %
HCT VFR BLD CALC: 41 % (ref 42–52)
HDLC SERPL-MCNC: 49 MG/DL (ref 36–68)
HEMOGLOBIN: 13.3 (ref 13.8–17.8)
LDL CHOLESTEROL CALCULATED: 75.2 MG/DL (ref 0–160)
LYMPHOCYTE %: 27.42 (ref 20–51.1)
LYMPHOCYTES ABSOLUTE: 0.89 (ref 1–5.5)
MCH RBC QN AUTO: 28.6 PG (ref 28.5–32.5)
MCHC RBC AUTO-ENTMCNC: 32.4 G/DL (ref 32–37)
MCV RBC AUTO: 88.1 FL (ref 80–94)
MICROALBUMIN UR-MCNC: 3.8 MG/DL (ref 0–1.7)
MICROALBUMIN/CREAT UR-RTO: 32.28
MONOCYTES %: 13.24 (ref 1.7–9.3)
MONOCYTES ABSOLUTE: 0.43 (ref 0.1–1)
NEUTROPHILS %: 53.44 (ref 42.2–75.2)
NEUTROPHILS ABSOLUTE: 1.73 (ref 2–8.1)
PDW BLD-RTO: 13.3 % (ref 10–15.5)
PLATELET # BLD: 154.2 THOU/MM3 (ref 130–400)
POTASSIUM SERPL-SCNC: 4.2 MMOL/L (ref 3.6–5)
RBC: 4.66 M/UL (ref 4.7–6.1)
SODIUM BLD-SCNC: 140 MMOL/L (ref 135–145)
TOTAL PROTEIN, SERUM: 6.7 G/DL (ref 6.3–8.2)
TRIGL SERPL-MCNC: 139 MG/DL (ref 10–250)
VLDLC SERPL CALC-MCNC: 28 MG/DL (ref 0–50)
WBC: 3.2 THOU/ML3 (ref 4.8–10.8)

## 2022-11-28 PROCEDURE — G8484 FLU IMMUNIZE NO ADMIN: HCPCS | Performed by: NURSE PRACTITIONER

## 2022-11-28 PROCEDURE — 3051F HG A1C>EQUAL 7.0%<8.0%: CPT | Performed by: NURSE PRACTITIONER

## 2022-11-28 PROCEDURE — 1036F TOBACCO NON-USER: CPT | Performed by: NURSE PRACTITIONER

## 2022-11-28 PROCEDURE — 1123F ACP DISCUSS/DSCN MKR DOCD: CPT | Performed by: NURSE PRACTITIONER

## 2022-11-28 PROCEDURE — 3078F DIAST BP <80 MM HG: CPT | Performed by: NURSE PRACTITIONER

## 2022-11-28 PROCEDURE — G8427 DOCREV CUR MEDS BY ELIG CLIN: HCPCS | Performed by: NURSE PRACTITIONER

## 2022-11-28 PROCEDURE — PBSHW POCT GLYCOSYLATED HEMOGLOBIN (HGB A1C): Performed by: NURSE PRACTITIONER

## 2022-11-28 PROCEDURE — 3074F SYST BP LT 130 MM HG: CPT | Performed by: NURSE PRACTITIONER

## 2022-11-28 PROCEDURE — 99214 OFFICE O/P EST MOD 30 MIN: CPT | Performed by: NURSE PRACTITIONER

## 2022-11-28 PROCEDURE — G8417 CALC BMI ABV UP PARAM F/U: HCPCS | Performed by: NURSE PRACTITIONER

## 2022-11-28 PROCEDURE — 83036 HEMOGLOBIN GLYCOSYLATED A1C: CPT | Performed by: NURSE PRACTITIONER

## 2022-11-28 NOTE — PROGRESS NOTES
[address] [address] [address]  Dept: [phone]  Dept Fax: [phone]    Patient:  Antoni Sanchez  YOB: 1945  Date of Service:  11/28/2022    Chief Complaint   Patient presents with    Chest Pain     C/o having pain on right side right under rib cage a couple weeks ago had vomiting with pain and lasted for several hours     ASSESSMENT /PLAN     1. Type 2 diabetes mellitus without complication, without long-term current use of insulin ([facility]  Assessment & Plan:   Borderline controlled, continue current medications, medication adherence emphasized and lifestyle modifications recommended. Hemoglobin A1C   Date Value Ref Range Status   11/28/2022 7.2 % Final     Orders:  -     Comprehensive Metabolic Panel; Future  -     Microalbumin, Ur; Future  -     Lipid, Fasting; Future  -     POCT glycosylated hemoglobin (Hb A1C)  -     CBC with Auto Differential; Future  2. Essential hypertension  Assessment & Plan:   Well-controlled, continue current medications  Orders:  -     Comprehensive Metabolic Panel; Future  -     Microalbumin, Ur; Future  -     Lipid, Fasting; Future  3. Mixed hyperlipidemia  -     Lipid, Fasting; Future  4. Gastroesophageal reflux disease without esophagitis  -     CBC with Auto Differential; Future  5. Mild persistent asthma without complication  Assessment & Plan:   Well-controlled, continue current medications  Orders:  -     CBC with Auto Differential; Future  6. Rheumatoid arthritis, involving unspecified site, unspecified whether rheumatoid factor present ([facility]  7. RUQ abdominal pain  -     XR ABDOMEN (KUB) (SINGLE AP VIEW); Future  8. Chronic right shoulder pain  Assessment & Plan:   Monitored by specialist- no acute findings meriting change in the plan  9. Combined forms of age-related cataract of right eye  Assessment & Plan:   Monitored by specialist- no acute findings meriting change in the plan  10. Exudative age-related macular degeneration of both eyes with active choroidal neovascularization ([address]  Assessment & Plan:   Monitored by specialist- no acute findings meriting change in the plan  11. Malignant tumor of prostate [facility])  Assessment & Plan:   Monitored by specialist- no acute findings meriting change in the plan, Dr. Cintia Bob. Diabetes education provided today:  Diabetic Neuropathy: signs and therapy. Foot care: advised to wash feet daily, pat dry and apply lotion at night, avoiding between toes. Need to look at feet daily and report to a physician any signs of inflammation or skin damage. Discussed diabetes shoes and socks. Diabetic retinopathy: the most frequent cause of blindness in [place] currently. Measures to prevent that. Nutrition as a mainstream of diabetes therapy. [place] about label reading. Carbs: good carbs and bad carbs, importance of carb counting, incorporation of protein with each meal to reduce Glycemic index, importance of portions, Carb/insulin ratio. Fats: Good fats and bad fats, meal planning and supplements. Physical activity: advised to exercise 5-7 days a week 30-60 mins at least. Discussed how it affects BS readings. Different diabetic medications. Managing high and low sugar readings. All patient questions answered. Patient voiced understanding. Instructed to continue current medications. Patient agreed with treatment plan. Follow up as directed. Return in about 3 months (around 2/28/2023) for DM, HTN, HLD. SUBJECTIVE:   Had an episode of RUQ pain sharp 8/10 lasted for a few hours. Also had nausea and vomiting with this. Happen afer eating a cheeseburger and then taking a nap. Has not has any additions issues. Hypertension, diabetes, and hyperlipidemia:  He indicates that he is feeling well and denies any symptoms referable to his elevated blood pressure or diabetes.  Specifically denies chest pain, palpitations, dyspnea, peripheral edema, thirst, frequent urination, and blurred vision. Current medication regimen is as listed below. He denies any side effects of medication, and has been compliant, taking it regularly. Home glucose readings have been 170-180. Checks blood sugars every other day.  Diabetic complications include:  none    Treatment Adherence:   Medication compliance:  compliant most of the time  Diet compliance:  compliant most of the time  Weight trend: stable  Current exercise: no regular exercise, bowling three times weekly    []  Hemoglobin A1c has been completed in the last 3-6 months  [x]  To be ordered today    []  Urine MicroAlbumin completed and reviewed within the last 12 months  [x]  To be ordered today    []  Annual eye exam has been completed within the past 12 months referring that patient does or does not have diabetic retinopathy  [x]  Recommendation to schedule appointment, scheduled 12/7/2022    []  Annual diabetic foot exam has been completed in office in the last 12 months  []  To be completed today    The 10-year ASCVD risk score (Stevenson LOPEZ, et al., 2019) is: 59.9%    Values used to calculate the score:      Age: 76 years      Sex: Male      Is Non- : No      Diabetic: Yes      Tobacco smoker: No      Systolic Blood Pressure: 533 mmHg      Is BP treated: Yes      HDL Cholesterol: 49 mg/dL      Total Cholesterol: 152 mg/dL     BP Readings from Last 3 Encounters:   11/28/22 138/74   10/25/22 (!) 142/80   02/14/22 134/74      Pulse Readings from Last 3 Encounters:   11/28/22 54   10/25/22 85   02/14/22 64      Wt Readings from Last 3 Encounters:   11/28/22 209 lb (94.8 kg)   10/25/22 207 lb (93.9 kg)   01/27/22 207 lb 14.4 oz (94.3 kg)        Allergies   Allergen Reactions    Crestor [Rosuvastatin]     Meloxicam      Current Outpatient Medications   Medication Sig Dispense Refill    esomeprazole (NEXIUM) 40 MG delayed release capsule TAKE 1 CAPSULE EVERY MORNING BEFORE BREAKFAST 90 capsule 3    metFORMIN (GLUCOPHAGE) 1000 MG tablet TAKE 1 TABLET TWICE A DAY WITH MEALS 180 tablet 3    simvastatin (ZOCOR) 40 MG tablet Take 1 tablet by mouth nightly 90 tablet 3    lisinopril (PRINIVIL;ZESTRIL) 20 MG tablet Take 1 tablet by mouth daily 90 tablet 3    blood glucose monitor strips 1 strip by Other route 2 times daily Test one to two times a day & as needed for symptoms of irregular blood glucose. Dispense sufficient amount for indicated testing frequency plus additional to accommodate PRN testing needs for One touch ultra meter 200 strip 0    albuterol sulfate HFA (PROAIR HFA) 108 (90 Base) MCG/ACT inhaler Inhale 2 puffs into the lungs every 4 hours as needed for Wheezing or Shortness of Breath (cough) 1 Inhaler 0    PROAIR  (90 Base) MCG/ACT inhaler USE 2 INHALATIONS EVERY 6 HOURS AS NEEDED FOR WHEEZING 25.5 g 3    DULERA 200-5 MCG/ACT inhaler USE 2 INHALATIONS EVERY 12 HOURS 39 g 3    Multiple Vitamins-Minerals (MULTIVITAMIN & MINERAL PO) Take by mouth      SITagliptin (JANUVIA) 100 MG tablet Take 1 tablet by mouth daily 90 tablet 3     No current facility-administered medications for this visit.       Past Medical History:   Diagnosis Date    Chronic midline low back pain with right-sided sciatica 1/17/2019    GERD (gastroesophageal reflux disease)     Hyperlipidemia     Hypertension     Kidney stone     Melanoma [facility])     Nuclear sclerotic cataract of right eye 11/20/2020    Panniculitis affecting regions of neck and back, lumbar region 7/26/2019    Prostate cancer ([address] 2018    Dr Rosaura Diana ; [facility] biopsies     Prostate cancer ([address] 3/26/2020    Raised prostate specific antigen 6/5/2017    Rheumatoid arthritis ([address]     Right corneal abrasion 10/26/2020    Sepsis ([address] 10/26/2018    Status post laser cataract surgery, left 11/20/2020    Type 2 diabetes mellitus ([address]       Past Surgical History:   Procedure Laterality Date    CARDIAC CATHETERIZATION      CHOLECYSTECTOMY      COLONOSCOPY  2018    Dr Sondra Yoder ;negative     NERVE SURGERY Left 7/26/2019    Left L4/L5 L5/S1 FACET performed by Colten Daniel MD at [facility] Left 8/13/2019    Left L4/L5 L5/S1 FACET performed by Colten Daniel MD at [address] [place] [address]  2018    Dr Audrey Reynoso      Family History   Problem Relation Age of Onset    Diabetes Mother        REVIEW OF SYSTEMS:     Review of Systems   Constitutional:  Negative for appetite change, chills, fatigue and fever. HENT: Negative. Eyes: Negative. Respiratory:  Negative for cough, shortness of breath and wheezing. Cardiovascular:  Negative for chest pain, palpitations and leg swelling. Gastrointestinal:  Negative for abdominal pain, constipation and diarrhea. Endocrine: Negative for cold intolerance, heat intolerance, polydipsia, polyphagia and polyuria. Genitourinary: Negative. Musculoskeletal:  Positive for arthralgias (right shoulder). Negative for myalgias. Allergic/Immunologic: Negative for environmental allergies and food allergies. Neurological:  Negative for dizziness, weakness, light-headedness and headaches. Psychiatric/Behavioral:  Negative for agitation, dysphoric mood, self-injury, sleep disturbance and suicidal ideas. The patient is not nervous/anxious. PHQ Scores 1/27/2022 10/13/2021 4/20/2021 12/15/2020 3/13/2020 9/19/2019 6/14/2018   PHQ2 Score 0 0 0 0 0 0 0   PHQ9 Score 0 0 0 0 0 0 0     Interpretation of Total Score Depression Severity: 1-4 = Minimal depression, 5-9 = Mild depression, 10-14 = Moderate depression, 15-19 = Moderately severe depression, 20-27 = Severe depression     PHYSICAL EXAM:     /74   Pulse 54   Wt 209 lb (94.8 kg)   SpO2 97%   BMI 31.78 kg/m²    Body mass index is 31.78 kg/m². Physical Exam  Constitutional:       Appearance: Normal appearance. He is well-developed and well-groomed. He is obese. HENT:      Head: Normocephalic.    Eyes:      Conjunctiva/sclera: Conjunctivae normal.   Neck:      Thyroid: No thyromegaly. Vascular: No carotid bruit. Cardiovascular:      Rate and Rhythm: Normal rate and regular rhythm. Heart sounds: Normal heart sounds. Pulmonary:      Effort: Pulmonary effort is normal.      Breath sounds: Normal breath sounds. No wheezing. Musculoskeletal:      Cervical back: Neck supple. Right lower leg: No edema. Left lower leg: No edema. Lymphadenopathy:      Cervical: No cervical adenopathy. Skin:     Capillary Refill: Capillary refill takes less than 2 seconds. Neurological:      Mental Status: He is alert and oriented to person, place, and time. Psychiatric:         Mood and Affect: Mood normal.         Behavior: Behavior is cooperative. Lab Results   Component Value Date    LABA1C 7.2 11/28/2022    LABA1C 6.6 01/27/2022    LABA1C 7.2 10/20/2021     Lab Results   Component Value Date    LABMICR 3.8 (H) 11/28/2022    LDLCALC 75.2 11/28/2022    CREATININE 1.2 11/28/2022        Please note that this chart was generated using voice recognition Dragon dictation software. Although every effort was made to ensure the accuracy of this automated transcription, some errors in transcription may have occurred.     Electronically signed by EDMOND Phillips CNP on 12/9/2022

## 2022-11-30 DIAGNOSIS — J45.30 MILD PERSISTENT ASTHMA WITHOUT COMPLICATION: ICD-10-CM

## 2022-11-30 DIAGNOSIS — K21.9 GASTROESOPHAGEAL REFLUX DISEASE WITHOUT ESOPHAGITIS: ICD-10-CM

## 2022-11-30 DIAGNOSIS — E78.2 MIXED HYPERLIPIDEMIA: ICD-10-CM

## 2022-11-30 DIAGNOSIS — I10 ESSENTIAL HYPERTENSION: ICD-10-CM

## 2022-11-30 DIAGNOSIS — E11.9 TYPE 2 DIABETES MELLITUS WITHOUT COMPLICATION, WITHOUT LONG-TERM CURRENT USE OF INSULIN (HCC): ICD-10-CM

## 2022-12-01 DIAGNOSIS — R10.11 RUQ ABDOMINAL PAIN: ICD-10-CM

## 2022-12-09 ASSESSMENT — ENCOUNTER SYMPTOMS
COUGH: 0
CONSTIPATION: 0
DIARRHEA: 0
EYES NEGATIVE: 1
ABDOMINAL PAIN: 0
SHORTNESS OF BREATH: 0
WHEEZING: 0

## 2022-12-09 NOTE — ASSESSMENT & PLAN NOTE
Borderline controlled, continue current medications, medication adherence emphasized and lifestyle modifications recommended.   Hemoglobin A1C   Date Value Ref Range Status   11/28/2022 7.2 % Final

## 2022-12-10 DIAGNOSIS — C61 MALIGNANT TUMOR OF PROSTATE (HCC): ICD-10-CM

## 2022-12-10 DIAGNOSIS — G89.29 CHRONIC RIGHT SHOULDER PAIN: Primary | ICD-10-CM

## 2022-12-10 DIAGNOSIS — M25.511 CHRONIC RIGHT SHOULDER PAIN: Primary | ICD-10-CM

## 2022-12-10 DIAGNOSIS — K21.9 GASTROESOPHAGEAL REFLUX DISEASE WITHOUT ESOPHAGITIS: ICD-10-CM

## 2022-12-10 DIAGNOSIS — M19.011 ARTHRITIS OF RIGHT SHOULDER REGION: ICD-10-CM

## 2022-12-10 DIAGNOSIS — E11.9 TYPE 2 DIABETES MELLITUS WITHOUT COMPLICATION, WITHOUT LONG-TERM CURRENT USE OF INSULIN (HCC): ICD-10-CM

## 2022-12-10 DIAGNOSIS — E78.5 HYPERLIPIDEMIA, UNSPECIFIED HYPERLIPIDEMIA TYPE: ICD-10-CM

## 2022-12-10 DIAGNOSIS — I10 ESSENTIAL HYPERTENSION: ICD-10-CM

## 2022-12-12 NOTE — TELEPHONE ENCOUNTER
Tristin Slaughter is calling to request a refill on the following medication(s):  Requested Prescriptions     Pending Prescriptions Disp Refills    esomeprazole ([address]) 40 MG delayed release capsule [Pharmacy Med Name: ESOMEPRAZOLE MAGNESIUM 40 MG Capsule Delayed Release] 90 capsule 3     Sig: TAKE 1 CAPSULE EVERY MORNING BEFORE BREAKFAST    lisinopril (PRINIVIL;ZESTRIL) 20 MG tablet [Pharmacy Med Name: LISINOPRIL 20 MG Tablet] 90 tablet 3     Sig: TAKE 1 TABLET EVERY DAY    simvastatin (ZOCOR) 40 MG tablet [Pharmacy Med Name: SIMVASTATIN 40 MG Tablet] 90 tablet 3     Sig: TAKE 1 TABLET EVERY NIGHT    tamsulosin (FLOMAX) 0.4 MG capsule [Pharmacy Med Name: TAMSULOSIN HYDROCHLORIDE 0.4 MG Capsule] 90 capsule      Sig: TAKE 1 CAPSULE EVERY DAY    DULoxetine (CYMBALTA) 30 MG extended release capsule [Pharmacy Med Name: DULOXETINE HCL 30 MG Capsule Delayed Release Particles] 90 capsule      Sig: TAKE 1 CAPSULE EVERY DAY    metFORMIN (GLUCOPHAGE) 1000 MG tablet [Pharmacy Med Name: METFORMIN HYDROCHLORIDE 1000 MG Tablet] 180 tablet 3     Sig: TAKE 1 TABLET TWICE A DAY WITH MEALS       Last Visit Date (If Applicable):  09/89/6382    Next Visit Date:    1/10/2023

## 2022-12-13 RX ORDER — DULOXETIN HYDROCHLORIDE 30 MG/1
CAPSULE, DELAYED RELEASE ORAL
Qty: 90 CAPSULE | Refills: 3 | Status: SHIPPED | OUTPATIENT
Start: 2022-12-13

## 2022-12-13 RX ORDER — ESOMEPRAZOLE MAGNESIUM 40 MG/1
CAPSULE, DELAYED RELEASE ORAL
Qty: 90 CAPSULE | Refills: 3 | Status: SHIPPED | OUTPATIENT
Start: 2022-12-13

## 2022-12-13 RX ORDER — TAMSULOSIN HYDROCHLORIDE 0.4 MG/1
CAPSULE ORAL
Qty: 90 CAPSULE | Refills: 3 | Status: SHIPPED | OUTPATIENT
Start: 2022-12-13

## 2022-12-13 RX ORDER — SIMVASTATIN 40 MG
TABLET ORAL
Qty: 90 TABLET | Refills: 3 | Status: SHIPPED | OUTPATIENT
Start: 2022-12-13

## 2022-12-13 RX ORDER — LISINOPRIL 20 MG/1
TABLET ORAL
Qty: 90 TABLET | Refills: 3 | Status: SHIPPED | OUTPATIENT
Start: 2022-12-13

## 2022-12-18 DIAGNOSIS — E11.8 TYPE 2 DIABETES MELLITUS WITH COMPLICATION (HCC): ICD-10-CM

## 2022-12-19 NOTE — TELEPHONE ENCOUNTER
Haleigh Olson is requesting a refill on the following medication(s):  Requested Prescriptions     Pending Prescriptions Disp Refills    JANUVIA 100 MG tablet [Pharmacy Med Name: JANUVIA 100 MG Tablet] 90 tablet 3     Sig: TAKE 1 TABLET EVERY DAY       Last Visit Date (If Applicable):  00/14/1082    Next Visit Date:    1/10/2023

## 2022-12-21 RX ORDER — SITAGLIPTIN 100 MG/1
TABLET, FILM COATED ORAL
Qty: 90 TABLET | Refills: 3 | Status: SHIPPED | OUTPATIENT
Start: 2022-12-21

## 2023-01-10 ENCOUNTER — OFFICE VISIT (OUTPATIENT)
Dept: FAMILY MEDICINE CLINIC | Age: 78
End: 2023-01-10
Payer: MEDICARE

## 2023-01-10 VITALS
WEIGHT: 212.4 LBS | SYSTOLIC BLOOD PRESSURE: 130 MMHG | BODY MASS INDEX: 32.3 KG/M2 | OXYGEN SATURATION: 96 % | DIASTOLIC BLOOD PRESSURE: 88 MMHG | HEART RATE: 74 BPM

## 2023-01-10 DIAGNOSIS — M25.511 CHRONIC RIGHT SHOULDER PAIN: ICD-10-CM

## 2023-01-10 DIAGNOSIS — K21.9 GASTROESOPHAGEAL REFLUX DISEASE WITHOUT ESOPHAGITIS: ICD-10-CM

## 2023-01-10 DIAGNOSIS — J45.30 MILD PERSISTENT ASTHMA WITHOUT COMPLICATION: ICD-10-CM

## 2023-01-10 DIAGNOSIS — C61 MALIGNANT TUMOR OF PROSTATE (HCC): ICD-10-CM

## 2023-01-10 DIAGNOSIS — H35.3231 EXUDATIVE AGE-RELATED MACULAR DEGENERATION OF BOTH EYES WITH ACTIVE CHOROIDAL NEOVASCULARIZATION (HCC): ICD-10-CM

## 2023-01-10 DIAGNOSIS — H25.811 COMBINED FORMS OF AGE-RELATED CATARACT OF RIGHT EYE: ICD-10-CM

## 2023-01-10 DIAGNOSIS — G89.29 CHRONIC RIGHT SHOULDER PAIN: ICD-10-CM

## 2023-01-10 DIAGNOSIS — M06.9 RHEUMATOID ARTHRITIS, INVOLVING UNSPECIFIED SITE, UNSPECIFIED WHETHER RHEUMATOID FACTOR PRESENT (HCC): ICD-10-CM

## 2023-01-10 DIAGNOSIS — Z01.818 ENCOUNTER FOR PRE-OPERATIVE EXAMINATION: Primary | ICD-10-CM

## 2023-01-10 DIAGNOSIS — E11.9 TYPE 2 DIABETES MELLITUS WITHOUT COMPLICATION, WITHOUT LONG-TERM CURRENT USE OF INSULIN (HCC): ICD-10-CM

## 2023-01-10 DIAGNOSIS — E78.2 MIXED HYPERLIPIDEMIA: ICD-10-CM

## 2023-01-10 DIAGNOSIS — I10 ESSENTIAL HYPERTENSION: ICD-10-CM

## 2023-01-10 DIAGNOSIS — M79.89 MASS OF SOFT TISSUE OF NECK: ICD-10-CM

## 2023-01-10 PROCEDURE — 1123F ACP DISCUSS/DSCN MKR DOCD: CPT | Performed by: NURSE PRACTITIONER

## 2023-01-10 PROCEDURE — 1036F TOBACCO NON-USER: CPT | Performed by: NURSE PRACTITIONER

## 2023-01-10 PROCEDURE — G8484 FLU IMMUNIZE NO ADMIN: HCPCS | Performed by: NURSE PRACTITIONER

## 2023-01-10 PROCEDURE — 99214 OFFICE O/P EST MOD 30 MIN: CPT | Performed by: NURSE PRACTITIONER

## 2023-01-10 PROCEDURE — G8427 DOCREV CUR MEDS BY ELIG CLIN: HCPCS | Performed by: NURSE PRACTITIONER

## 2023-01-10 PROCEDURE — 3074F SYST BP LT 130 MM HG: CPT | Performed by: NURSE PRACTITIONER

## 2023-01-10 PROCEDURE — G8417 CALC BMI ABV UP PARAM F/U: HCPCS | Performed by: NURSE PRACTITIONER

## 2023-01-10 PROCEDURE — 3078F DIAST BP <80 MM HG: CPT | Performed by: NURSE PRACTITIONER

## 2023-01-10 ASSESSMENT — PATIENT HEALTH QUESTIONNAIRE - PHQ9
SUM OF ALL RESPONSES TO PHQ9 QUESTIONS 1 & 2: 0
2. FEELING DOWN, DEPRESSED OR HOPELESS: 0
SUM OF ALL RESPONSES TO PHQ QUESTIONS 1-9: 0
SUM OF ALL RESPONSES TO PHQ QUESTIONS 1-9: 0
1. LITTLE INTEREST OR PLEASURE IN DOING THINGS: 0
SUM OF ALL RESPONSES TO PHQ QUESTIONS 1-9: 0
SUM OF ALL RESPONSES TO PHQ QUESTIONS 1-9: 0

## 2023-01-10 ASSESSMENT — ENCOUNTER SYMPTOMS
DIARRHEA: 0
ABDOMINAL PAIN: 0
COUGH: 0
WHEEZING: 0
SHORTNESS OF BREATH: 0
CONSTIPATION: 0

## 2023-01-10 NOTE — PROGRESS NOTES
[address] [address] [address]  Dept: [phone]  Dept Fax: [phone]      Fernnado Sen  YOB: 1945  Date of Service:  1/10/2023    Chief Complaint   Patient presents with    Pre-op Exam     Having cataract surgery to right eye on 1/31/2023 with [facility]      Assessment/Plan:       1. Encounter for pre-operative examination  2. Combined forms of age-related cataract of right eye  3. Exudative age-related macular degeneration of both eyes with active choroidal neovascularization ([address]  Assessment & Plan:   Monitored by specialist- no acute findings meriting change in the plan  4. Type 2 diabetes mellitus without complication, without long-term current use of insulin ([address]  Assessment & Plan:   Borderline controlled, continue current medications, medication adherence emphasized and lifestyle modifications recommended. Lab Results   Component Value Date    LABA1C 7.2 11/28/2022    LABA1C 6.6 01/27/2022    LABA1C 7.2 10/20/2021     Lab Results   Component Value Date    LABMICR 3.8 (H) 11/28/2022    LDLCALC 75.2 11/28/2022    CREATININE 1.2 11/28/2022     5. Essential hypertension  Assessment & Plan:   Well-controlled, continue current medications  6. Mixed hyperlipidemia  Assessment & Plan:   Well-controlled, continue current medications  7. Mass of soft tissue of neck  Comments:  right  Orders:  -     US HEAD NECK SOFT TISSUE THYROID; Future  8. Gastroesophageal reflux disease without esophagitis  Assessment & Plan:   Well-controlled, continue current medications  9. Rheumatoid arthritis, involving unspecified site, unspecified whether rheumatoid factor present ([address]  10. Malignant tumor of prostate ([address]  11. Mild persistent asthma without complication  Assessment & Plan:   Well-controlled, continue current medications  12.  Chronic right shoulder pain     Known risk factors for perioperative complications: {PERIOPERATIVE RISK FACTORS:20004}  Current medications which may produce withdrawal symptoms if withheld perioperatively: {NONE DEFAULTED:11360::\"none\"}     1. Preoperative workup as follows: {STUDIES; PRE-OP Z/O:70336}  2. Change in medication regimen before surgery: {MEDICATION REGIMEN BEFORE SURGERY:20007}  3. Prophylaxis for cardiac events with perioperative beta-blockers: {PROPHYLAXIS CARDIAC EVENTS W PERIOPERATIVE BETA-BLOCKERS:20011}  ACC/AHA indications for pre-operative beta-blocker use:    Vascular surgery with history of postitive stress test  Intermediate or high risk surgery withhistory of CAD   Intermediate or high risk surgery with multiple clinical predictors of CAD- 2 of thefollowing: history of compensated or prior heart failure, history of cerebrovascular disease, DM, or renal insufficiency    Routine administration of higher-dose, long-acting metoprolol in beta-blocker-naïve patients on the day of surgery, and in the absence of dose titration is associated with an overall increase in mortality. Beta-blockers should be started days to weeks prior tosurgery and titrated to pulse < 70.  4. Deep vein thrombosis prophylaxis: {plan; dvt prophylaxis:07577}  5. {Preop assessment:36614}      Please note that this chart was generated using voice recognition Dragon dictation software. Although every effort was made to ensure the accuracy of this automated transcription, some errors in transcription may have occurred. Subjective:   Patient presents to the office today for a preoperative examination at the request of surgeon, Dr. Jama North, who plans on performing cataract removal right eye on 1/31/2023 at [address] [place] [address]. Blood sugar fluctuating 170-180. Last A1C 7.2 on 11/28/2022. Hx CVA 1994    Completed PT for the shoulder and states he is doing better . ENT for swollen right mass. States it is a saliva gland. Chronic 18-20 years ago. Dr. Farrah Gibbs.     Known anesthesia problems:{ANESTHESIA PROBLEMS:}   Bleeding risk: {BLEEDING RISK:83492}  Personal or FH of DVT/PE: {NO/YES:6561638913::\"No\"}      HPI     BP Readings from Last 3 Encounters:   01/10/23 130/88   22 138/74   10/25/22 (!) 142/80        Pulse Readings from Last 3 Encounters:   01/10/23 74   22 54   10/25/22 85        Wt Readings from Last 3 Encounters:   01/10/23 212 lb 6.4 oz (96.3 kg)   22 209 lb (94.8 kg)   10/25/22 207 lb (93.9 kg)        Past Medical History:   Diagnosis Date    Chronic midline low back pain with right-sided sciatica 2019    GERD (gastroesophageal reflux disease)     Hyperlipidemia     Hypertension     Kidney stone     Melanoma ([address]     Nuclear sclerotic cataract of right eye 2020    Panniculitis affecting regions of neck and back, lumbar region 2019    Prostate cancer ([address] 2018    Dr Lubna Navarro ; saturatio biopsies     Prostate cancer ([address] 2020    Raised prostate specific antigen 2017    Rheumatoid arthritis ([address]     Right corneal abrasion 10/26/2020    Sepsis ([address] 10/26/2018    Status post laser cataract surgery, left 2020    Stroke ([address] 1994    sagittal sinus venous thrombosis    Type 2 diabetes mellitus ([address]      Past Surgical History:   Procedure Laterality Date    CARDIAC CATHETERIZATION      CHOLECYSTECTOMY      COLONOSCOPY  2018    Dr Wilder Prescott ;negative     NERVE SURGERY Left 2019    Left L4/L5 L5/S1 FACET performed by Jovanny Thompson MD at [facility] Left 2019    Left L4/L5 L5/S1 FACET performed by Jovanny Thompson MD at [address] [place] [address]  2018    Dr Lubna Navarro      Family History   Problem Relation Age of Onset    Diabetes Mother      Social History     Tobacco Use    Smoking status: Former     Packs/day: 1.00     Years: 45.00     Pack years: 45.00     Types: Cigarettes     Quit date:      Years since quittin.0    Smokeless tobacco: Never    Tobacco comments:     loc rrt,10/26/2018   Vaping Use    Vaping Use: Never used   Substance Use Topics    Alcohol use: Yes     Alcohol/week: 1.0 standard drink     Types: 1 Cans of beer per week    Drug use: No       Allergies   Allergen Reactions    Crestor [Rosuvastatin]     Meloxicam      Outpatient Medications Marked as Taking for the 1/10/23 encounter (Office Visit) with EDMOND Adames - CNP   Medication Sig Dispense Refill    JANUVIA 100 MG tablet TAKE 1 TABLET EVERY DAY 90 tablet 3    esomeprazole (NEXIUM) 40 MG delayed release capsule TAKE 1 CAPSULE EVERY MORNING BEFORE BREAKFAST 90 capsule 3    lisinopril (PRINIVIL;ZESTRIL) 20 MG tablet TAKE 1 TABLET EVERY DAY 90 tablet 3    simvastatin (ZOCOR) 40 MG tablet TAKE 1 TABLET EVERY NIGHT 90 tablet 3    tamsulosin (FLOMAX) 0.4 MG capsule TAKE 1 CAPSULE EVERY DAY 90 capsule 3    DULoxetine (CYMBALTA) 30 MG extended release capsule TAKE 1 CAPSULE EVERY DAY 90 capsule 3    metFORMIN (GLUCOPHAGE) 1000 MG tablet TAKE 1 TABLET TWICE A DAY WITH MEALS 180 tablet 3    blood glucose monitor strips 1 strip by Other route 2 times daily Test one to two times a day & as needed for symptoms of irregular blood glucose. Dispense sufficient amount for indicated testing frequency plus additional to accommodate PRN testing needs for One touch ultra meter 200 strip 0    albuterol sulfate HFA (PROAIR HFA) 108 (90 Base) MCG/ACT inhaler Inhale 2 puffs into the lungs every 4 hours as needed for Wheezing or Shortness of Breath (cough) 1 Inhaler 0    PROAIR  (90 Base) MCG/ACT inhaler USE 2 INHALATIONS EVERY 6 HOURS AS NEEDED FOR WHEEZING 25.5 g 3    DULERA 200-5 MCG/ACT inhaler USE 2 INHALATIONS EVERY 12 HOURS 39 g 3    Multiple Vitamins-Minerals (MULTIVITAMIN & MINERAL PO) Take by mouth       Review of Systems   Constitutional:  Negative for appetite change, chills, fatigue and fever. HENT: Negative. Eyes:  Positive for visual disturbance. Respiratory:  Negative for cough, shortness of breath and wheezing.     Cardiovascular:  Negative for chest pain, palpitations and leg swelling. Gastrointestinal:  Negative for abdominal pain, constipation and diarrhea. Endocrine: Negative for cold intolerance, heat intolerance, polydipsia, polyphagia and polyuria. Genitourinary: Negative. Musculoskeletal:  Positive for arthralgias (right shoulder - improving). Negative for myalgias. Neurological:  Negative for dizziness, weakness, light-headedness and headaches. Psychiatric/Behavioral:  Negative for agitation, dysphoric mood, self-injury, sleep disturbance and suicidal ideas. The patient is not nervous/anxious. Objective:     Vitals:    01/10/23 1418   BP: 130/88   Pulse: 74   SpO2: 96%   Weight: 212 lb 6.4 oz (96.3 kg)      Physical Exam   Constitutional: He is oriented to person, place, and time. He appears well-developed and well-nourished. No distress. HENT:   Head: Normocephalic and atraumatic. Mouth/Throat: Uvula is midline, oropharynx is clear and moist and mucous membranes are normal. Upper full denture, partial on the bottom. Eyes: Conjunctivae and EOM are normal. Pupils are equal, round, and reactive to light. Neck: Trachea normal and normal range of motion. Neck supple. No JVD present. Carotid bruit is not present. Soft tissue mass noted on right, no redness, swelling or pain with palpation. Cardiovascular: Normal rate, regular rhythm, normal heart sounds and intact distal pulses. Exam reveals no gallop and no friction rub. No murmur heard. Pulmonary/Chest: Effort normal and breath sounds normal. No respiratory distress. He has no wheezes. He has no rales. Abdominal: Soft. Normal bowel sounds. He exhibits no distension and no mass. There is no hepatosplenomegaly. No tenderness. Musculoskeletal: He exhibits no edema and no tenderness. Neurological: He is alert and oriented to person, place, and time. He has normal strength. No cranial nerve deficit or sensory deficit.  Coordination and gait normal.   Skin: Skin is warm and dry. No rash noted. No erythema. Psychiatric: He has a normal mood and affect.  His behavior is normal.     Lab Review:  Lab Results   Component Value Date    WBC 3.2 (L) 11/28/2022    HGB 13.3 (L) 11/28/2022    HCT 41.0 (L) 11/28/2022    MCV 88.1 11/28/2022    .2 11/28/2022     Lab Results   Component Value Date     11/28/2022    K 4.2 11/28/2022     11/28/2022    CO2 29 11/28/2022    BUN 12 11/28/2022    CREATININE 1.2 11/28/2022    GLUCOSE 176 (H) 11/28/2022    CALCIUM 8.9 11/28/2022    PROT 7.2 11/09/2020    LABALBU 3.6 11/28/2022    BILITOT 0.6 11/28/2022    ALKPHOS 68 11/28/2022    AST 26 11/28/2022    ALT 19 11/28/2022    LABGLOM > 60.0 11/28/2022    GFRAA >60 11/09/2020    AGRATIO 1.2 03/18/2019    GLOB 3.0 11/28/2022       Lab Results   Component Value Date/Time    APPEARANCE CLOUDY 11/07/2018 12:17 PM    COLORU Pale Yellow 03/13/2020 05:44 PM    COLORU YELLOW 11/07/2018 12:17 PM    LABSPEC 1.020 03/13/2020 05:44 PM    NITRU Negative 03/13/2020 05:44 PM    GLUCOSEU 2+ 03/13/2020 05:44 PM    KETUA 2+ 03/13/2020 05:44 PM    UROBILINOGEN 0.2 03/13/2020 05:44 PM    BILIRUBINUR Negative 03/13/2020 05:44 PM    BILIRUBINUR neg 04/10/2018 04:13 PM        Electronically signed by EDMOND Rose CNP on 1/10/2023 at 10:00 PM.

## 2023-01-11 NOTE — ASSESSMENT & PLAN NOTE
Borderline controlled, continue current medications, medication adherence emphasized and lifestyle modifications recommended.    Lab Results   Component Value Date    LABA1C 7.2 11/28/2022    LABA1C 6.6 01/27/2022    LABA1C 7.2 10/20/2021     Lab Results   Component Value Date    LABMICR 3.8 (H) 11/28/2022    LDLCALC 75.2 11/28/2022    CREATININE 1.2 11/28/2022

## 2023-01-11 NOTE — PROGRESS NOTES
[address] [address] [address]  Dept: [phone]  Dept Fax: [phone]      Lowell Zee  YOB: 1945  Date of Service:  1/10/2023    Chief Complaint   Patient presents with    Pre-op Exam     Having cataract surgery to right eye on 1/31/2023 with [facility]      Assessment/Plan:       1. Encounter for pre-operative examination  2. Combined forms of age-related cataract of right eye  3. Exudative age-related macular degeneration of both eyes with active choroidal neovascularization ([address]  Assessment & Plan:   Monitored by specialist- no acute findings meriting change in the plan  4. Type 2 diabetes mellitus without complication, without long-term current use of insulin ([address]  Assessment & Plan:   Borderline controlled, continue current medications, medication adherence emphasized and lifestyle modifications recommended. Lab Results   Component Value Date    LABA1C 7.2 11/28/2022    LABA1C 6.6 01/27/2022    LABA1C 7.2 10/20/2021     Lab Results   Component Value Date    LABMICR 3.8 (H) 11/28/2022    LDLCALC 75.2 11/28/2022    CREATININE 1.2 11/28/2022     5. Essential hypertension  Assessment & Plan:   Well-controlled, continue current medications  6. Mixed hyperlipidemia  Assessment & Plan:   Well-controlled, continue current medications  7. Mass of soft tissue of neck  Comments:  right  Orders:  -     US HEAD NECK SOFT TISSUE THYROID; Future  8. Gastroesophageal reflux disease without esophagitis  Assessment & Plan:   Well-controlled, continue current medications  9. Rheumatoid arthritis, involving unspecified site, unspecified whether rheumatoid factor present ([address]  10. Malignant tumor of prostate ([address]  11. Mild persistent asthma without complication  Assessment & Plan:   Well-controlled, continue current medications  12.  Chronic right shoulder pain     No contraindications to planned surgery      Please note that this chart was generated using voice recognition Dragon dictation software. Although every effort was made to ensure the accuracy of this automated transcription, some errors in transcription may have occurred. Subjective:   Patient presents to the office today for a preoperative examination at the request of surgeon, Dr. Juliana Mahmood, who plans on performing cataract removal right eye on 1/31/2023 at [address] [place] [address] in [place]. Blood sugar has been fluctuating 170-180. Last A1C 7.2 on 11/28/2022. Completed PT for the shoulder and states he is doing better .      Known anesthesia problems:None   Bleeding risk: No recent or remote history of abnormal bleeding  Personal or FH of DVT/PE: No      BP Readings from Last 3 Encounters:   01/10/23 130/88   11/28/22 138/74   10/25/22 (!) 142/80        Pulse Readings from Last 3 Encounters:   01/10/23 74   11/28/22 54   10/25/22 85        Wt Readings from Last 3 Encounters:   01/10/23 212 lb 6.4 oz (96.3 kg)   11/28/22 209 lb (94.8 kg)   10/25/22 207 lb (93.9 kg)        Past Medical History:   Diagnosis Date    Chronic midline low back pain with right-sided sciatica 01/17/2019    GERD (gastroesophageal reflux disease)     Hyperlipidemia     Hypertension     Kidney stone     Melanoma ([address]     Nuclear sclerotic cataract of right eye 11/20/2020    Panniculitis affecting regions of neck and back, lumbar region 07/26/2019    Prostate cancer ([address] 2018    Dr Debbie Shipley ; saturatio biopsies     Prostate cancer ([address] 03/26/2020    Raised prostate specific antigen 06/05/2017    Rheumatoid arthritis ([address]     Right corneal abrasion 10/26/2020    Sepsis ([address] 10/26/2018    Status post laser cataract surgery, left 11/20/2020    Stroke ([address] 1994    sagittal sinus venous thrombosis    Type 2 diabetes mellitus ([address]      Past Surgical History:   Procedure Laterality Date    CARDIAC CATHETERIZATION      CHOLECYSTECTOMY      COLONOSCOPY  2018    Dr Rafia Hernandez ;negative     NERVE SURGERY Left 7/26/2019 Left L4/L5 L5/S1 [facility]    Left L4/L5 L5/S1 FACET performed by Parrish Bass MD at [address] [place] [address]  2018    Dr Stephan Severs      Family History   Problem Relation Age of Onset    Diabetes Mother      Social History     Tobacco Use    Smoking status: Former     Packs/day: 1.00     Years: 45.00     Pack years: 45.00     Types: Cigarettes     Quit date:      Years since quittin.0    Smokeless tobacco: Never    Tobacco comments:     loc rrt,10/26/2018   Vaping Use    Vaping Use: Never used   Substance Use Topics    Alcohol use: Yes     Alcohol/week: 1.0 standard drink     Types: 1 Cans of beer per week    Drug use: No       Allergies   Allergen Reactions    Crestor [Rosuvastatin]     Meloxicam      Outpatient Medications Marked as Taking for the 1/10/23 encounter (Office Visit) with EDMOND Morris CNP   Medication Sig Dispense Refill    JANUVIA 100 MG tablet TAKE 1 TABLET EVERY DAY 90 tablet 3    esomeprazole (NEXIUM) 40 MG delayed release capsule TAKE 1 CAPSULE EVERY MORNING BEFORE BREAKFAST 90 capsule 3    lisinopril (PRINIVIL;ZESTRIL) 20 MG tablet TAKE 1 TABLET EVERY DAY 90 tablet 3    simvastatin (ZOCOR) 40 MG tablet TAKE 1 TABLET EVERY NIGHT 90 tablet 3    tamsulosin (FLOMAX) 0.4 MG capsule TAKE 1 CAPSULE EVERY DAY 90 capsule 3    DULoxetine (CYMBALTA) 30 MG extended release capsule TAKE 1 CAPSULE EVERY DAY 90 capsule 3    metFORMIN (GLUCOPHAGE) 1000 MG tablet TAKE 1 TABLET TWICE A DAY WITH MEALS 180 tablet 3    blood glucose monitor strips 1 strip by Other route 2 times daily Test one to two times a day & as needed for symptoms of irregular blood glucose.  Dispense sufficient amount for indicated testing frequency plus additional to accommodate PRN testing needs for One touch ultra meter 200 strip 0    albuterol sulfate HFA (PROAIR HFA) 108 (90 Base) MCG/ACT inhaler Inhale 2 puffs into the lungs every 4 hours as needed for Wheezing or Shortness of Breath (cough) 1 Inhaler 0    PROAIR  (90 Base) MCG/ACT inhaler USE 2 INHALATIONS EVERY 6 HOURS AS NEEDED FOR WHEEZING 25.5 g 3    DULERA 200-5 MCG/ACT inhaler USE 2 INHALATIONS EVERY 12 HOURS 39 g 3    Multiple Vitamins-Minerals (MULTIVITAMIN & MINERAL PO) Take by mouth       Review of Systems   Constitutional:  Negative for appetite change, chills, fatigue and fever. HENT: Negative. Eyes:  Positive for visual disturbance. Respiratory:  Negative for cough, shortness of breath and wheezing. Cardiovascular:  Negative for chest pain, palpitations and leg swelling. Gastrointestinal:  Negative for abdominal pain, constipation and diarrhea. Endocrine: Negative for cold intolerance, heat intolerance, polydipsia, polyphagia and polyuria. Genitourinary: Negative. Musculoskeletal:  Positive for arthralgias (right shoulder - improving). Negative for myalgias. Neurological:  Negative for dizziness, weakness, light-headedness and headaches. Hematological:  Positive for adenopathy (chronic swollen mass on neck). Psychiatric/Behavioral:  Negative for agitation, dysphoric mood, self-injury, sleep disturbance and suicidal ideas. The patient is not nervous/anxious. Objective:     Vitals:    01/10/23 1418   BP: 130/88   Pulse: 74   SpO2: 96%   Weight: 212 lb 6.4 oz (96.3 kg)      Physical Exam   Constitutional: He is oriented to person, place, and time. He appears well-developed and well-nourished. No distress. HENT:   Head: Normocephalic and atraumatic. Mouth/Throat: Uvula is midline, oropharynx is clear and moist and mucous membranes are normal. Upper full denture, partial on the bottom. Eyes: Conjunctivae and EOM are normal. Pupils are equal, round, and reactive to light. Neck: Trachea normal and normal range of motion. Neck supple. No JVD present. Carotid bruit is not present.  Soft tissue mass noted to right submandibular, no redness, swelling or pain with palpation. Cardiovascular: Normal rate, regular rhythm, normal heart sounds and intact distal pulses. Exam reveals no gallop and no friction rub. No murmur heard. Pulmonary/Chest: Effort normal and breath sounds normal. No respiratory distress. He has no wheezes. He has no rales. Abdominal: Soft. Normal bowel sounds. He exhibits no distension and no mass. There is no hepatosplenomegaly. No tenderness. Musculoskeletal: He exhibits no edema and no tenderness. Neurological: He is alert and oriented to person, place, and time. He has normal strength. No cranial nerve deficit or sensory deficit. Coordination and gait normal.   Skin: Skin is warm and dry. No rash noted. No erythema. Psychiatric: He has a normal mood and affect.  His behavior is normal.     Lab Review:  Lab Results   Component Value Date    WBC 3.2 (L) 11/28/2022    HGB 13.3 (L) 11/28/2022    HCT 41.0 (L) 11/28/2022    MCV 88.1 11/28/2022    .2 11/28/2022     Lab Results   Component Value Date     11/28/2022    K 4.2 11/28/2022     11/28/2022    CO2 29 11/28/2022    BUN 12 11/28/2022    CREATININE 1.2 11/28/2022    GLUCOSE 176 (H) 11/28/2022    CALCIUM 8.9 11/28/2022    PROT 7.2 11/09/2020    LABALBU 3.6 11/28/2022    BILITOT 0.6 11/28/2022    ALKPHOS 68 11/28/2022    AST 26 11/28/2022    ALT 19 11/28/2022    LABGLOM > 60.0 11/28/2022    GFRAA >60 11/09/2020    AGRATIO 1.2 03/18/2019    GLOB 3.0 11/28/2022       Lab Results   Component Value Date/Time    APPEARANCE CLOUDY 11/07/2018 12:17 PM    COLORU Pale Yellow 03/13/2020 05:44 PM    COLORU YELLOW 11/07/2018 12:17 PM    LABSPEC 1.020 03/13/2020 05:44 PM    NITRU Negative 03/13/2020 05:44 PM    GLUCOSEU 2+ 03/13/2020 05:44 PM    KETUA 2+ 03/13/2020 05:44 PM    UROBILINOGEN 0.2 03/13/2020 05:44 PM    BILIRUBINUR Negative 03/13/2020 05:44 PM    BILIRUBINUR neg 04/10/2018 04:13 PM        Electronically signed by EDMOND Ashley CNP on 1/15/2023 at 3:31 PM.

## 2023-03-08 ENCOUNTER — OFFICE VISIT (OUTPATIENT)
Dept: FAMILY MEDICINE CLINIC | Age: 78
End: 2023-03-08
Payer: MEDICARE

## 2023-03-08 VITALS
WEIGHT: 210 LBS | DIASTOLIC BLOOD PRESSURE: 78 MMHG | SYSTOLIC BLOOD PRESSURE: 137 MMHG | BODY MASS INDEX: 31.93 KG/M2 | OXYGEN SATURATION: 96 % | HEART RATE: 84 BPM

## 2023-03-08 DIAGNOSIS — K21.9 GASTROESOPHAGEAL REFLUX DISEASE WITHOUT ESOPHAGITIS: ICD-10-CM

## 2023-03-08 DIAGNOSIS — I10 ESSENTIAL HYPERTENSION: ICD-10-CM

## 2023-03-08 DIAGNOSIS — E78.2 MIXED HYPERLIPIDEMIA: ICD-10-CM

## 2023-03-08 DIAGNOSIS — E11.9 TYPE 2 DIABETES MELLITUS WITHOUT COMPLICATION, WITHOUT LONG-TERM CURRENT USE OF INSULIN (HCC): Primary | ICD-10-CM

## 2023-03-08 DIAGNOSIS — G89.29 CHRONIC RIGHT SHOULDER PAIN: ICD-10-CM

## 2023-03-08 DIAGNOSIS — J45.30 MILD PERSISTENT ASTHMA WITHOUT COMPLICATION: ICD-10-CM

## 2023-03-08 DIAGNOSIS — M25.511 CHRONIC RIGHT SHOULDER PAIN: ICD-10-CM

## 2023-03-08 PROBLEM — H25.811 COMBINED FORMS OF AGE-RELATED CATARACT OF RIGHT EYE: Status: RESOLVED | Noted: 2022-08-15 | Resolved: 2023-03-08

## 2023-03-08 LAB — HBA1C MFR BLD: 7.1 %

## 2023-03-08 PROCEDURE — 1036F TOBACCO NON-USER: CPT | Performed by: NURSE PRACTITIONER

## 2023-03-08 PROCEDURE — G8417 CALC BMI ABV UP PARAM F/U: HCPCS | Performed by: NURSE PRACTITIONER

## 2023-03-08 PROCEDURE — 3078F DIAST BP <80 MM HG: CPT | Performed by: NURSE PRACTITIONER

## 2023-03-08 PROCEDURE — 99214 OFFICE O/P EST MOD 30 MIN: CPT | Performed by: NURSE PRACTITIONER

## 2023-03-08 PROCEDURE — 3074F SYST BP LT 130 MM HG: CPT | Performed by: NURSE PRACTITIONER

## 2023-03-08 PROCEDURE — 83036 HEMOGLOBIN GLYCOSYLATED A1C: CPT | Performed by: NURSE PRACTITIONER

## 2023-03-08 PROCEDURE — PBSHW POCT GLYCOSYLATED HEMOGLOBIN (HGB A1C): Performed by: NURSE PRACTITIONER

## 2023-03-08 PROCEDURE — 3051F HG A1C>EQUAL 7.0%<8.0%: CPT | Performed by: NURSE PRACTITIONER

## 2023-03-08 PROCEDURE — G8484 FLU IMMUNIZE NO ADMIN: HCPCS | Performed by: NURSE PRACTITIONER

## 2023-03-08 PROCEDURE — G8427 DOCREV CUR MEDS BY ELIG CLIN: HCPCS | Performed by: NURSE PRACTITIONER

## 2023-03-08 PROCEDURE — 1123F ACP DISCUSS/DSCN MKR DOCD: CPT | Performed by: NURSE PRACTITIONER

## 2023-03-08 ASSESSMENT — PATIENT HEALTH QUESTIONNAIRE - PHQ9
1. LITTLE INTEREST OR PLEASURE IN DOING THINGS: 0
SUM OF ALL RESPONSES TO PHQ9 QUESTIONS 1 & 2: 0
SUM OF ALL RESPONSES TO PHQ QUESTIONS 1-9: 0
2. FEELING DOWN, DEPRESSED OR HOPELESS: 0

## 2023-03-08 NOTE — PROGRESS NOTES
[address] [address] [address]  Dept: [phone]  Dept Fax: [phone]    Date of Service:  3/8/2023    Ary Anderson is a [de-identified] y.o. male who presents in office today with Self    Chief Complaint   Patient presents with    Hypertension     Has been monitoring bp at home and has been elevated this morning was 183/98 Pulse was at 90 highest reading has been 186/102. Diagnoses / Plan:   1. Type 2 diabetes mellitus without complication, without long-term current use of insulin (HCC)  Assessment & Plan:   Borderline controlled, continue current medications. Lab Results   Component Value Date    LABA1C 7.1 03/08/2023    LABA1C 7.2 11/28/2022    LABA1C 6.6 01/27/2022     Lab Results   Component Value Date    LABMICR 3.8 (H) 11/28/2022    LDLCALC 75.2 11/28/2022    CREATININE 1.2 11/28/2022     Orders:  -     POCT glycosylated hemoglobin (Hb A1C)  2. Essential hypertension  Assessment & Plan:   Well-controlled, continue current medications. Instructed patient to monitor blood pressure at home and keep a log. Increase water and activity level, decrease salt in diet. 3. Mixed hyperlipidemia  4. Mild persistent asthma without complication  Assessment & Plan:   Well-controlled, continue current medications. 5. Gastroesophageal reflux disease without esophagitis  Assessment & Plan:   Well-controlled, continue current medications. 6. Chronic right shoulder pain     Instructed to increase water. Encouraged healthy diet and routine exercise. Instructed to continue current medications. All patient questions answered. Patient voiced understanding. Return in about 3 months (around 6/8/2023) for Annual Medicare Wellness. Subjective (Review of Systems)   History of Present Illness:  Blood pressure at home has been high. Sometimes has a slight dull headache. Had an eye exam yesterday and has a follow up next week for injection.  . Does not drink water, only Pepsi. Going on vacation at the end of the month. Hypertension, diabetes, and hyperlipidemia:  He indicates that he is feeling well and denies any symptoms referable to his elevated blood pressure or diabetes. Specifically denies chest pain, palpitations, dyspnea, peripheral edema, thirst, frequent urination, and blurred vision. Current medication regimen is as listed below. He denies any side effects of medication, and has been compliant, taking it regularly. Home glucose readings have been 160s. Checks blood sugars every other day. Diabetic complications include:  none     Treatment Adherence:   Medication compliance:  compliant most of the time  Diet compliance:  compliant most of the time  Weight trend: stable  Current exercise: no regular exercise, bowling three times weekly     []  Hemoglobin A1c has been completed in the last 3-6 months  [x]  To be ordered today     [x]  Urine MicroAlbumin completed and reviewed within the last 12 months  []  To be ordered today     [x]  Annual eye exam has been completed within the past 12 months referring that patient does or does not have diabetic retinopathy. []  Recommendation to schedule appointment    BP Readings from Last 3 Encounters:   03/08/23 137/78   01/10/23 130/88   11/28/22 138/74      Wt Readings from Last 3 Encounters:   03/08/23 210 lb (95.3 kg)   01/10/23 212 lb 6.4 oz (96.3 kg)   11/28/22 209 lb (94.8 kg)      The ASCVD Risk score (Stevenson LOPEZ, et al., 2019) failed to calculate for the following reasons: The 2019 ASCVD risk score is only valid for ages 36 to 78    Review of Systems   Constitutional:  Negative for appetite change, chills, fatigue and fever. HENT: Negative. Eyes:  Positive for visual disturbance. Respiratory:  Negative for cough, shortness of breath and wheezing. Cardiovascular:  Negative for chest pain, palpitations and leg swelling.    Gastrointestinal:  Negative for abdominal pain, constipation, diarrhea and nausea. Endocrine: Negative for cold intolerance, heat intolerance, polydipsia, polyphagia and polyuria. Genitourinary: Negative. Musculoskeletal:  Positive for arthralgias (right shoulder). Negative for myalgias. Neurological:  Positive for headaches (occasional dull). Negative for dizziness and light-headedness. Psychiatric/Behavioral:  Negative for agitation, dysphoric mood, self-injury, sleep disturbance and suicidal ideas. The patient is not nervous/anxious. PHQ Scores 3/8/2023 1/10/2023 1/27/2022 10/13/2021 4/20/2021 12/15/2020 3/13/2020   PHQ2 Score 0 0 0 0 0 0 0   PHQ9 Score 0 0 0 0 0 0 0     Interpretation of Total Score Depression Severity: 1-4 = Minimal depression, 5-9 = Mild depression, 10-14 = Moderate depression, 15-19 = Moderately severe depression, 20-27 = Severe depression     Reviewed     [x] Past Medical, Family, and Social History was reviewed.     [x] Laboratory Results, Vital signs, Imaging, Active Problems, Immunizations, Current/Recently Discontinued Medications, Health Maintenance Activities Due, Referral Notes (if available) were reviewed per writer     [x] Reviewed Depression screening if taken or valid today or any other valid screening tool (others seen below)     Current Outpatient Medications   Medication Sig Dispense Refill    JANUVIA 100 MG tablet TAKE 1 TABLET EVERY DAY 90 tablet 3    esomeprazole (NEXIUM) 40 MG delayed release capsule TAKE 1 CAPSULE EVERY MORNING BEFORE BREAKFAST 90 capsule 3    lisinopril (PRINIVIL;ZESTRIL) 20 MG tablet TAKE 1 TABLET EVERY DAY 90 tablet 3    simvastatin (ZOCOR) 40 MG tablet TAKE 1 TABLET EVERY NIGHT 90 tablet 3    tamsulosin (FLOMAX) 0.4 MG capsule TAKE 1 CAPSULE EVERY DAY 90 capsule 3    DULoxetine (CYMBALTA) 30 MG extended release capsule TAKE 1 CAPSULE EVERY DAY 90 capsule 3    metFORMIN (GLUCOPHAGE) 1000 MG tablet TAKE 1 TABLET TWICE A DAY WITH MEALS 180 tablet 3    blood glucose monitor strips 1 strip by Other route 2 times daily Test one to two times a day & as needed for symptoms of irregular blood glucose. Dispense sufficient amount for indicated testing frequency plus additional to accommodate PRN testing needs for One touch ultra meter 200 strip 0    albuterol sulfate HFA (PROAIR HFA) 108 (90 Base) MCG/ACT inhaler Inhale 2 puffs into the lungs every 4 hours as needed for Wheezing or Shortness of Breath (cough) 1 Inhaler 0    PROAIR  (90 Base) MCG/ACT inhaler USE 2 INHALATIONS EVERY 6 HOURS AS NEEDED FOR WHEEZING 25.5 g 3    DULERA 200-5 MCG/ACT inhaler USE 2 INHALATIONS EVERY 12 HOURS 39 g 3    Multiple Vitamins-Minerals (MULTIVITAMIN & MINERAL PO) Take by mouth       No current facility-administered medications for this visit. Objective (Physical Assessment)     Vitals:    03/08/23 1416 03/08/23 1453   BP: (!) 132/96 137/78   Pulse: 97 84   SpO2: 96%    Weight: 210 lb (95.3 kg)       Estimated body mass index is 31.93 kg/m² as calculated from the following:    Height as of 10/25/22: 5' 8\" (1.727 m). Weight as of this encounter: 210 lb (95.3 kg). Physical Exam  Constitutional:       Appearance: Normal appearance. He is well-developed and well-groomed. He is obese. HENT:      Head: Normocephalic. Eyes:      Conjunctiva/sclera: Conjunctivae normal.   Neck:      Thyroid: No thyromegaly. Vascular: No carotid bruit. Cardiovascular:      Rate and Rhythm: Normal rate and regular rhythm. Heart sounds: Normal heart sounds. Pulmonary:      Effort: Pulmonary effort is normal.      Breath sounds: Normal breath sounds. No wheezing. Musculoskeletal:      Cervical back: Neck supple. Right lower leg: No edema. Left lower leg: No edema. Lymphadenopathy:      Cervical: No cervical adenopathy. Skin:     Capillary Refill: Capillary refill takes less than 2 seconds. Neurological:      Mental Status: He is alert and oriented to person, place, and time.       Gait: Gait normal.   Psychiatric: Behavior: Behavior is cooperative. Please note that this chart was generated using voice recognition Dragon dictation software. Although every effort was made to ensure the accuracy of this automated transcription, some errors in transcription may have occurred.     Electronically signed by EDMOND Wen CNP on 3/19/2023 at 12:28 PM.

## 2023-03-19 PROBLEM — M75.00 ADHESIVE CAPSULITIS OF SHOULDER: Status: ACTIVE | Noted: 2023-03-19

## 2023-03-19 ASSESSMENT — ENCOUNTER SYMPTOMS
NAUSEA: 0
DIARRHEA: 0
WHEEZING: 0
SHORTNESS OF BREATH: 0
COUGH: 0
ABDOMINAL PAIN: 0
CONSTIPATION: 0

## 2023-03-19 NOTE — ASSESSMENT & PLAN NOTE
Borderline controlled, continue current medications.    Lab Results   Component Value Date    LABA1C 7.1 03/08/2023    LABA1C 7.2 11/28/2022    LABA1C 6.6 01/27/2022     Lab Results   Component Value Date    LABMICR 3.8 (H) 11/28/2022    LDLCALC 75.2 11/28/2022    CREATININE 1.2 11/28/2022

## 2023-03-19 NOTE — ASSESSMENT & PLAN NOTE
Well-controlled, continue current medications. Instructed patient to monitor blood pressure at home and keep a log. Increase water and activity level, decrease salt in diet.

## 2023-09-20 ENCOUNTER — OFFICE VISIT (OUTPATIENT)
Dept: FAMILY MEDICINE CLINIC | Age: 78
End: 2023-09-20
Payer: MEDICARE

## 2023-09-20 VITALS
TEMPERATURE: 97.2 F | HEART RATE: 92 BPM | WEIGHT: 204.6 LBS | BODY MASS INDEX: 31.01 KG/M2 | DIASTOLIC BLOOD PRESSURE: 72 MMHG | HEIGHT: 68 IN | OXYGEN SATURATION: 96 % | SYSTOLIC BLOOD PRESSURE: 128 MMHG

## 2023-09-20 DIAGNOSIS — R09.82 POST-NASAL DRIP: ICD-10-CM

## 2023-09-20 DIAGNOSIS — J01.90 ACUTE BACTERIAL SINUSITIS: Primary | ICD-10-CM

## 2023-09-20 DIAGNOSIS — R05.1 ACUTE COUGH: ICD-10-CM

## 2023-09-20 DIAGNOSIS — B96.89 ACUTE BACTERIAL SINUSITIS: Primary | ICD-10-CM

## 2023-09-20 PROCEDURE — G8427 DOCREV CUR MEDS BY ELIG CLIN: HCPCS | Performed by: NURSE PRACTITIONER

## 2023-09-20 PROCEDURE — 99213 OFFICE O/P EST LOW 20 MIN: CPT | Performed by: NURSE PRACTITIONER

## 2023-09-20 PROCEDURE — 3074F SYST BP LT 130 MM HG: CPT | Performed by: NURSE PRACTITIONER

## 2023-09-20 PROCEDURE — 1036F TOBACCO NON-USER: CPT | Performed by: NURSE PRACTITIONER

## 2023-09-20 PROCEDURE — 1123F ACP DISCUSS/DSCN MKR DOCD: CPT | Performed by: NURSE PRACTITIONER

## 2023-09-20 PROCEDURE — G8417 CALC BMI ABV UP PARAM F/U: HCPCS | Performed by: NURSE PRACTITIONER

## 2023-09-20 PROCEDURE — 3078F DIAST BP <80 MM HG: CPT | Performed by: NURSE PRACTITIONER

## 2023-09-20 RX ORDER — FLUTICASONE PROPIONATE 50 MCG
1 SPRAY, SUSPENSION (ML) NASAL 2 TIMES DAILY
Qty: 16 G | Refills: 0 | Status: SHIPPED | OUTPATIENT
Start: 2023-09-20

## 2023-09-20 RX ORDER — AMOXICILLIN AND CLAVULANATE POTASSIUM 875; 125 MG/1; MG/1
1 TABLET, FILM COATED ORAL 2 TIMES DAILY
Qty: 20 TABLET | Refills: 0 | Status: SHIPPED | OUTPATIENT
Start: 2023-09-20 | End: 2023-09-30

## 2023-09-20 RX ORDER — BENZONATATE 200 MG/1
200 CAPSULE ORAL 3 TIMES DAILY PRN
Qty: 30 CAPSULE | Refills: 0 | Status: SHIPPED | OUTPATIENT
Start: 2023-09-20 | End: 2023-09-30

## 2023-09-20 SDOH — ECONOMIC STABILITY: FOOD INSECURITY: WITHIN THE PAST 12 MONTHS, YOU WORRIED THAT YOUR FOOD WOULD RUN OUT BEFORE YOU GOT MONEY TO BUY MORE.: NEVER TRUE

## 2023-09-20 SDOH — ECONOMIC STABILITY: FOOD INSECURITY: WITHIN THE PAST 12 MONTHS, THE FOOD YOU BOUGHT JUST DIDN'T LAST AND YOU DIDN'T HAVE MONEY TO GET MORE.: NEVER TRUE

## 2023-09-20 SDOH — ECONOMIC STABILITY: HOUSING INSECURITY
IN THE LAST 12 MONTHS, WAS THERE A TIME WHEN YOU DID NOT HAVE A STEADY PLACE TO SLEEP OR SLEPT IN A SHELTER (INCLUDING NOW)?: NO

## 2023-09-20 SDOH — ECONOMIC STABILITY: INCOME INSECURITY: HOW HARD IS IT FOR YOU TO PAY FOR THE VERY BASICS LIKE FOOD, HOUSING, MEDICAL CARE, AND HEATING?: NOT HARD AT ALL

## 2023-09-20 ASSESSMENT — ENCOUNTER SYMPTOMS
WHEEZING: 1
HEMOPTYSIS: 0
COUGH: 1
RHINORRHEA: 1
SORE THROAT: 0
SINUS PRESSURE: 1

## 2023-09-20 NOTE — PATIENT INSTRUCTIONS
Recommended over the counter medications/treatments: The use of an antihistamine (Claritin or Zyrtec) may help with sinus congestion and drainage. A nasal steroid spray (Flonase or Nasacort) should be used to help decrease swelling and irritation in the sinuses to reduce congestion and drainage. Mucinex (12 hour) will also help to thin mucus so that it drains easier and improves congestion. Works best if you are drinking plenty of water. Honey with or without Lemon may also help with coughing. Alternating hot liquids with cold liquids helps to soothe a sore throat. Use Acetaminophen (Tylenol) to help relieve fever, chills or body aches. If allowed, you may alternate using ibuprofen (Motrin) and Tylenol. Do not exceed 3,000mg of Tylenol in a 24 hour time period. Make sure to stay well hydrated by drinking plenty of water. Follow up with primary care provider in 2 to 3 days or as needed if no improvement or worsening of your symptoms.

## 2023-09-20 NOTE — PROGRESS NOTES
[facility] department of [facility]  [address] [address]  Phone: [phone]  Fax: [phone]      Radha Costa is a 78 y.o. male who presents to the [address] [place] [address] today for his medical conditions/complaints as noted below. Radha Costa is c/o of Cough (Mucous-producing cough started a couple weeks ago. A couple days after he got his RSV and FLU vaccines is when he first noticed it. Gets congestion in throat. Took home Covid test a couple days ago and it was negative.)      Assessment and Plan     1. Acute bacterial sinusitis  - amoxicillin-clavulanate (AUGMENTIN) 875-125 MG per tablet; Take 1 tablet by mouth 2 times daily for 10 days  Dispense: 20 tablet; Refill: 0    2. Post-nasal drip  - fluticasone (FLONASE) 50 MCG/ACT nasal spray; 1 spray by Each Nostril route in the morning and 1 spray in the evening. Dispense: 16 g; Refill: 0    3. Acute cough  - benzonatate (TESSALON) 200 MG capsule; Take 1 capsule by mouth 3 times daily as needed for Cough  Dispense: 30 capsule; Refill: 0    Cough that is now productive at times with gray mucus. Symptoms started 12 days ago and since receiving Flu and RSV vaccination. Most likely bacterial sinus infection now as lungs are clear throughout lung fields and without wheezes or rhonchi noted. Encouraged increased fluid intake and use of Zyrtec daily. May also find Flonase 2 times a day helpful. Tessalon pearls prescribed for cough as needed. He was advised to take Augmentin 2 times a day for 10 days with food. Follow up with PCP if no significant improvement noted in the next 3-4 days or if symptoms worsen. Subjective:   Cough  This is a new problem. Episode onset: 9/8/2023. The problem has been gradually worsening. The problem occurs constantly. The cough is Productive of sputum (gray). Associated symptoms include postnasal drip, rhinorrhea and wheezing.  Pertinent negatives include

## 2023-10-26 ENCOUNTER — OFFICE VISIT (OUTPATIENT)
Dept: FAMILY MEDICINE CLINIC | Age: 78
End: 2023-10-26
Payer: MEDICARE

## 2023-10-26 VITALS
WEIGHT: 206 LBS | OXYGEN SATURATION: 96 % | HEART RATE: 68 BPM | BODY MASS INDEX: 31.55 KG/M2 | DIASTOLIC BLOOD PRESSURE: 76 MMHG | SYSTOLIC BLOOD PRESSURE: 138 MMHG

## 2023-10-26 DIAGNOSIS — C61 MALIGNANT TUMOR OF PROSTATE (HCC): ICD-10-CM

## 2023-10-26 DIAGNOSIS — K21.9 GASTROESOPHAGEAL REFLUX DISEASE WITHOUT ESOPHAGITIS: ICD-10-CM

## 2023-10-26 DIAGNOSIS — H35.3231 EXUDATIVE AGE-RELATED MACULAR DEGENERATION OF BOTH EYES WITH ACTIVE CHOROIDAL NEOVASCULARIZATION (HCC): ICD-10-CM

## 2023-10-26 DIAGNOSIS — E78.2 MIXED HYPERLIPIDEMIA: ICD-10-CM

## 2023-10-26 DIAGNOSIS — Z00.00 MEDICARE ANNUAL WELLNESS VISIT, SUBSEQUENT: Primary | ICD-10-CM

## 2023-10-26 DIAGNOSIS — M06.9 RHEUMATOID ARTHRITIS, INVOLVING UNSPECIFIED SITE, UNSPECIFIED WHETHER RHEUMATOID FACTOR PRESENT (HCC): ICD-10-CM

## 2023-10-26 DIAGNOSIS — E11.9 TYPE 2 DIABETES MELLITUS WITHOUT COMPLICATION, WITHOUT LONG-TERM CURRENT USE OF INSULIN (HCC): ICD-10-CM

## 2023-10-26 DIAGNOSIS — I10 ESSENTIAL HYPERTENSION: ICD-10-CM

## 2023-10-26 DIAGNOSIS — J45.30 MILD PERSISTENT ASTHMA WITHOUT COMPLICATION: ICD-10-CM

## 2023-10-26 LAB
ALBUMIN/GLOBULIN RATIO: 1.2 G/DL
ALBUMIN: 3.8 G/DL (ref 3.5–5)
ALP BLD-CCNC: 72 UNITS/L (ref 38–126)
ALT SERPL-CCNC: 11 UNITS/L (ref 4–50)
ANION GAP SERPL CALCULATED.3IONS-SCNC: 12.1 MMOL/L (ref 12–16)
AST SERPL-CCNC: 20 UNITS/L (ref 17–59)
BASOPHILS %: 1.99 (ref 0–3)
BASOPHILS ABSOLUTE: 0.07 (ref 0–0.3)
BILIRUB SERPL-MCNC: 0.4 MG/DL (ref 0.2–1.3)
BUN BLDV-MCNC: 19 MG/DL (ref 9–20)
CALCIUM SERPL-MCNC: 9 MG/DL (ref 8.4–10.2)
CHLORIDE BLD-SCNC: 110 MMOL/L (ref 98–120)
CHOLESTEROL/HDL RATIO: 3.9 RATIO (ref 0–4.5)
CHOLESTEROL: 160 MG/DL (ref 50–200)
CO2: 23 MMOL/L (ref 22–31)
CREAT SERPL-MCNC: 1.2 MG/DL (ref 0.7–1.3)
CREATININE, RANDOM URINE: 108.2 MG/DL (ref 20–370)
DIAGNOSTIC PSA: < 0.06 NG/ML (ref 0–4)
EOSINOPHILS %: 3.4 (ref 0–10)
EOSINOPHILS ABSOLUTE: 0.13 (ref 0–1.1)
GFR CALCULATED: > 60
GLOBULIN: 3.1 G/DL
GLUCOSE: 147 MG/DL (ref 75–110)
HBA1C MFR BLD: 6.2 %
HCT VFR BLD CALC: 42.7 % (ref 42–52)
HDLC SERPL-MCNC: 41 MG/DL (ref 36–68)
HEMOGLOBIN: 13.4 (ref 13.8–17.8)
LDL CHOLESTEROL CALCULATED: 70.8 MG/DL (ref 0–160)
LYMPHOCYTE %: 32.75 (ref 20–51.1)
LYMPHOCYTES ABSOLUTE: 1.22 (ref 1–5.5)
MCH RBC QN AUTO: 28.2 PG (ref 28.5–32.5)
MCHC RBC AUTO-ENTMCNC: 31.2 G/DL (ref 32–37)
MCV RBC AUTO: 90.4 FL (ref 80–94)
MICROALBUMIN UR-MCNC: 1.5 MG/DL (ref 0–1.7)
MICROALBUMIN/CREAT UR-RTO: 13.86
MONOCYTES %: 9.5 (ref 1.7–9.3)
MONOCYTES ABSOLUTE: 0.35 (ref 0.1–1)
NEUTROPHILS %: 52.37 (ref 42.2–75.2)
NEUTROPHILS ABSOLUTE: 1.95 (ref 2–8.1)
PDW BLD-RTO: 13.1 % (ref 10–15.5)
PLATELET # BLD: 157.3 THOU/MM3 (ref 130–400)
POTASSIUM SERPL-SCNC: 5.1 MMOL/L (ref 3.6–5)
RBC: 4.73 M/UL (ref 4.7–6.1)
SODIUM BLD-SCNC: 140 MMOL/L (ref 135–145)
TOTAL PROTEIN, SERUM: 6.9 G/DL (ref 6.3–8.2)
TRIGL SERPL-MCNC: 241 MG/DL (ref 10–250)
VLDLC SERPL CALC-MCNC: 48 MG/DL (ref 0–50)
WBC: 3.7 THOU/ML3 (ref 4.8–10.8)

## 2023-10-26 PROCEDURE — 83036 HEMOGLOBIN GLYCOSYLATED A1C: CPT | Performed by: NURSE PRACTITIONER

## 2023-10-26 PROCEDURE — G0439 PPPS, SUBSEQ VISIT: HCPCS | Performed by: NURSE PRACTITIONER

## 2023-10-26 PROCEDURE — 3078F DIAST BP <80 MM HG: CPT | Performed by: NURSE PRACTITIONER

## 2023-10-26 PROCEDURE — 3044F HG A1C LEVEL LT 7.0%: CPT | Performed by: NURSE PRACTITIONER

## 2023-10-26 PROCEDURE — 1123F ACP DISCUSS/DSCN MKR DOCD: CPT | Performed by: NURSE PRACTITIONER

## 2023-10-26 PROCEDURE — G8484 FLU IMMUNIZE NO ADMIN: HCPCS | Performed by: NURSE PRACTITIONER

## 2023-10-26 PROCEDURE — PBSHW POCT GLYCOSYLATED HEMOGLOBIN (HGB A1C): Performed by: NURSE PRACTITIONER

## 2023-10-26 PROCEDURE — 3074F SYST BP LT 130 MM HG: CPT | Performed by: NURSE PRACTITIONER

## 2023-10-26 ASSESSMENT — PATIENT HEALTH QUESTIONNAIRE - PHQ9
SUM OF ALL RESPONSES TO PHQ QUESTIONS 1-9: 0
1. LITTLE INTEREST OR PLEASURE IN DOING THINGS: 0
2. FEELING DOWN, DEPRESSED OR HOPELESS: 0
SUM OF ALL RESPONSES TO PHQ QUESTIONS 1-9: 0
SUM OF ALL RESPONSES TO PHQ9 QUESTIONS 1 & 2: 0
SUM OF ALL RESPONSES TO PHQ QUESTIONS 1-9: 0
SUM OF ALL RESPONSES TO PHQ QUESTIONS 1-9: 0

## 2023-10-26 ASSESSMENT — LIFESTYLE VARIABLES
HOW OFTEN DO YOU HAVE A DRINK CONTAINING ALCOHOL: NEVER
HOW MANY STANDARD DRINKS CONTAINING ALCOHOL DO YOU HAVE ON A TYPICAL DAY: PATIENT DOES NOT DRINK

## 2023-10-26 NOTE — PROGRESS NOTES
Medicare Annual Wellness Visit    Nitin Olmstead is here for No chief complaint on file. Assessment & Plan   Medicare annual wellness visit, subsequent  -     CBC with Auto Differential  -     Comprehensive Metabolic Panel  -     Lipid Panel  -     Microalbumin Panel  -     PSA, Diagnostic  Type 2 diabetes mellitus without complication, without long-term current use of insulin (HCC)  Assessment & Plan:   Well-controlled, continue current medications. Hemoglobin A1C   Date Value Ref Range Status   10/26/2023 6.2 % Corrected     Orders:  -     POCT glycosylated hemoglobin (Hb A1C)  -     Comprehensive Metabolic Panel  -     Lipid Panel  -     Microalbumin Panel  Essential hypertension  Assessment & Plan:   Well-controlled, continue current medications  Orders:  -     Comprehensive Metabolic Panel  -     Lipid Panel  -     Microalbumin Panel  Mixed hyperlipidemia  Assessment & Plan:  Doing well on statin.   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

## 2023-12-13 DIAGNOSIS — E78.5 HYPERLIPIDEMIA, UNSPECIFIED HYPERLIPIDEMIA TYPE: ICD-10-CM

## 2023-12-13 DIAGNOSIS — C61 MALIGNANT TUMOR OF PROSTATE (HCC): ICD-10-CM

## 2023-12-13 DIAGNOSIS — I10 ESSENTIAL HYPERTENSION: ICD-10-CM

## 2023-12-13 DIAGNOSIS — K21.9 GASTROESOPHAGEAL REFLUX DISEASE WITHOUT ESOPHAGITIS: ICD-10-CM

## 2023-12-13 RX ORDER — LISINOPRIL 20 MG/1
TABLET ORAL
Qty: 90 TABLET | Refills: 3 | Status: SHIPPED | OUTPATIENT
Start: 2023-12-13

## 2023-12-13 RX ORDER — TAMSULOSIN HYDROCHLORIDE 0.4 MG/1
CAPSULE ORAL
Qty: 90 CAPSULE | Refills: 3 | Status: SHIPPED | OUTPATIENT
Start: 2023-12-13

## 2023-12-13 RX ORDER — ESOMEPRAZOLE MAGNESIUM 40 MG/1
CAPSULE, DELAYED RELEASE ORAL
Qty: 90 CAPSULE | Refills: 3 | Status: SHIPPED | OUTPATIENT
Start: 2023-12-13

## 2023-12-13 RX ORDER — SIMVASTATIN 40 MG
TABLET ORAL
Qty: 90 TABLET | Refills: 3 | Status: SHIPPED | OUTPATIENT
Start: 2023-12-13

## 2023-12-13 RX ORDER — DULOXETIN HYDROCHLORIDE 30 MG/1
30 CAPSULE, DELAYED RELEASE ORAL DAILY
Qty: 90 CAPSULE | Refills: 3 | Status: SHIPPED | OUTPATIENT
Start: 2023-12-13

## 2023-12-13 NOTE — TELEPHONE ENCOUNTER
Ilda Elise is calling to request a refill on the following medication(s):  Requested Prescriptions     Pending Prescriptions Disp Refills    DULoxetine (CYMBALTA) 30 MG extended release capsule [Pharmacy Med Name: DULOXETINE HCL 30 MG Capsule Delayed Release Particles] 90 capsule 3     Sig: TAKE 1 CAPSULE EVERY DAY    lisinopril (PRINIVIL;ZESTRIL) 20 MG tablet [Pharmacy Med Name: LISINOPRIL 20 MG Tablet] 90 tablet 3     Sig: TAKE 1 TABLET EVERY DAY    simvastatin (ZOCOR) 40 MG tablet [Pharmacy Med Name: SIMVASTATIN 40 MG Tablet] 90 tablet 3     Sig: TAKE 1 TABLET EVERY NIGHT    tamsulosin (FLOMAX) 0.4 MG capsule [Pharmacy Med Name: TAMSULOSIN HYDROCHLORIDE 0.4 MG Capsule] 90 capsule 3     Sig: TAKE 1 CAPSULE EVERY DAY    esomeprazole (NEXIUM) 40 MG delayed release capsule [Pharmacy Med Name: ESOMEPRAZOLE MAGNESIUM 40 MG Capsule Delayed Release] 90 capsule 3     Sig: TAKE 1 CAPSULE EVERY MORNING BEFORE BREAKFAST       Last Visit Date (If Applicable):  45/56/3787    Next Visit Date:    Visit date not found

## 2023-12-23 DIAGNOSIS — E11.9 TYPE 2 DIABETES MELLITUS WITHOUT COMPLICATION, WITHOUT LONG-TERM CURRENT USE OF INSULIN (HCC): ICD-10-CM

## 2023-12-26 NOTE — TELEPHONE ENCOUNTER
Braden Vallejo is calling to request a refill on the following medication(s):  Requested Prescriptions     Pending Prescriptions Disp Refills    metFORMIN (GLUCOPHAGE) 1000 MG tablet [Pharmacy Med Name: METFORMIN HYDROCHLORIDE 1000 MG Tablet] 180 tablet 0     Sig: TAKE 1 TABLET TWICE DAILY WITH MEALS       Last Visit Date (If Applicable):  24/95/8344    Next Visit Date:    Visit date not found

## 2024-03-01 DIAGNOSIS — E11.8 TYPE 2 DIABETES MELLITUS WITH COMPLICATION (HCC): ICD-10-CM

## 2024-03-01 RX ORDER — SITAGLIPTIN 100 MG/1
TABLET, FILM COATED ORAL
Qty: 90 TABLET | Refills: 3 | Status: SHIPPED | OUTPATIENT
Start: 2024-03-01

## 2024-03-01 NOTE — TELEPHONE ENCOUNTER
Mir Felix is calling to request a refill on the following medication(s):  Requested Prescriptions     Pending Prescriptions Disp Refills    JANUVIA 100 MG tablet [Pharmacy Med Name: JANUVIA 100 MG Tablet] 90 tablet 3     Sig: TAKE 1 TABLET EVERY DAY       Last Visit Date (If Applicable):  10/26/2023    Next Visit Date:    Visit date not found

## 2024-04-16 ENCOUNTER — OFFICE VISIT (OUTPATIENT)
Dept: FAMILY MEDICINE CLINIC | Age: 79
End: 2024-04-16
Payer: MEDICARE

## 2024-04-16 VITALS
WEIGHT: 216 LBS | BODY MASS INDEX: 33.09 KG/M2 | DIASTOLIC BLOOD PRESSURE: 78 MMHG | SYSTOLIC BLOOD PRESSURE: 138 MMHG | HEART RATE: 61 BPM | OXYGEN SATURATION: 96 %

## 2024-04-16 DIAGNOSIS — L02.91 ABSCESS: Primary | ICD-10-CM

## 2024-04-16 DIAGNOSIS — D17.1 LIPOMA OF BACK: ICD-10-CM

## 2024-04-16 PROCEDURE — 99213 OFFICE O/P EST LOW 20 MIN: CPT | Performed by: NURSE PRACTITIONER

## 2024-04-16 PROCEDURE — 1123F ACP DISCUSS/DSCN MKR DOCD: CPT | Performed by: NURSE PRACTITIONER

## 2024-04-16 PROCEDURE — G8417 CALC BMI ABV UP PARAM F/U: HCPCS | Performed by: NURSE PRACTITIONER

## 2024-04-16 PROCEDURE — 3075F SYST BP GE 130 - 139MM HG: CPT | Performed by: NURSE PRACTITIONER

## 2024-04-16 PROCEDURE — 3078F DIAST BP <80 MM HG: CPT | Performed by: NURSE PRACTITIONER

## 2024-04-16 PROCEDURE — G8427 DOCREV CUR MEDS BY ELIG CLIN: HCPCS | Performed by: NURSE PRACTITIONER

## 2024-04-16 PROCEDURE — 1036F TOBACCO NON-USER: CPT | Performed by: NURSE PRACTITIONER

## 2024-04-16 RX ORDER — SULFAMETHOXAZOLE AND TRIMETHOPRIM 800; 160 MG/1; MG/1
1 TABLET ORAL 2 TIMES DAILY
Qty: 20 TABLET | Refills: 0 | Status: SHIPPED | OUTPATIENT
Start: 2024-04-16 | End: 2024-04-26

## 2024-04-16 ASSESSMENT — PATIENT HEALTH QUESTIONNAIRE - PHQ9
SUM OF ALL RESPONSES TO PHQ9 QUESTIONS 1 & 2: 0
2. FEELING DOWN, DEPRESSED OR HOPELESS: NOT AT ALL
SUM OF ALL RESPONSES TO PHQ QUESTIONS 1-9: 0
SUM OF ALL RESPONSES TO PHQ QUESTIONS 1-9: 0
1. LITTLE INTEREST OR PLEASURE IN DOING THINGS: NOT AT ALL
SUM OF ALL RESPONSES TO PHQ QUESTIONS 1-9: 0
SUM OF ALL RESPONSES TO PHQ QUESTIONS 1-9: 0

## 2024-04-16 NOTE — PROGRESS NOTES
[address] [facility], [address]  [place] [id number]  Dept: [phone]  Dept Fax: [phone]    Date of Service:  4/16/2024    Chief Complaint   Patient presents with    Skin Problem     Has a sore on back near left shoulder blade had a abscess there years ago        Diagnoses / Plan:   1. Abscess  -     sulfamethoxazole-trimethoprim (BACTRIM DS;SEPTRA DS) 800-160 MG per tablet; Take 1 tablet by mouth 2 times daily for 10 days, Disp-20 tablet, R-0Normal  -     Culture, Aerobic; Future  2. Lipoma of back     Start Bactrim as discussed. Follow up after completion of antibiotic. May need referral to general surgery for removal of lipoma.     Medication sent to the pharmacy.  Discussed medication desired effects, potential side effects, and how to take the medication. Patient verbalizes understanding regarding plan of care and all questions answered.    Return in about 2 weeks (around 4/30/2024).     Subjective:   History of Present Illness:  Noticed a spot on the back that feels sore. Started 1 week ago and gradually worsening. Pain is continuous rated 3/10. He is leaving for vacation in a few days and would like to have it resolved.      Current Outpatient Medications   Medication Sig Dispense Refill    JANUVIA 100 MG tablet TAKE 1 TABLET EVERY DAY 90 tablet 3    metFORMIN (GLUCOPHAGE) 1000 MG tablet TAKE 1 TABLET TWICE DAILY WITH MEALS 180 tablet 0    DULoxetine (CYMBALTA) 30 MG extended release capsule TAKE 1 CAPSULE EVERY DAY 90 capsule 3    lisinopril (PRINIVIL;ZESTRIL) 20 MG tablet TAKE 1 TABLET EVERY DAY 90 tablet 3    simvastatin (ZOCOR) 40 MG tablet TAKE 1 TABLET EVERY NIGHT 90 tablet 3    tamsulosin (FLOMAX) 0.4 MG capsule TAKE 1 CAPSULE EVERY DAY 90 capsule 3    esomeprazole (NEXIUM) 40 MG delayed release capsule TAKE 1 CAPSULE EVERY MORNING BEFORE BREAKFAST 90 capsule 3    fluticasone (FLONASE) 50 MCG/ACT nasal spray 1 spray by Each Nostril route in the

## 2024-04-19 DIAGNOSIS — L02.91 ABSCESS: ICD-10-CM

## 2024-04-29 ENCOUNTER — OFFICE VISIT (OUTPATIENT)
Dept: FAMILY MEDICINE CLINIC | Age: 79
End: 2024-04-29
Payer: MEDICARE

## 2024-04-29 VITALS
DIASTOLIC BLOOD PRESSURE: 80 MMHG | BODY MASS INDEX: 32.47 KG/M2 | OXYGEN SATURATION: 97 % | HEART RATE: 57 BPM | WEIGHT: 212 LBS | SYSTOLIC BLOOD PRESSURE: 128 MMHG

## 2024-04-29 DIAGNOSIS — S21.202D WOUND OF LEFT SIDE OF BACK, SUBSEQUENT ENCOUNTER: Primary | ICD-10-CM

## 2024-04-29 DIAGNOSIS — L98.9 CHANGING SKIN LESION: ICD-10-CM

## 2024-04-29 PROCEDURE — 99214 OFFICE O/P EST MOD 30 MIN: CPT | Performed by: NURSE PRACTITIONER

## 2024-04-29 PROCEDURE — 3074F SYST BP LT 130 MM HG: CPT | Performed by: NURSE PRACTITIONER

## 2024-04-29 PROCEDURE — G8417 CALC BMI ABV UP PARAM F/U: HCPCS | Performed by: NURSE PRACTITIONER

## 2024-04-29 PROCEDURE — G8427 DOCREV CUR MEDS BY ELIG CLIN: HCPCS | Performed by: NURSE PRACTITIONER

## 2024-04-29 PROCEDURE — 3079F DIAST BP 80-89 MM HG: CPT | Performed by: NURSE PRACTITIONER

## 2024-04-29 PROCEDURE — 99213 OFFICE O/P EST LOW 20 MIN: CPT | Performed by: NURSE PRACTITIONER

## 2024-04-29 PROCEDURE — 1036F TOBACCO NON-USER: CPT | Performed by: NURSE PRACTITIONER

## 2024-04-29 PROCEDURE — 1123F ACP DISCUSS/DSCN MKR DOCD: CPT | Performed by: NURSE PRACTITIONER

## 2024-04-29 RX ORDER — CEPHALEXIN 500 MG/1
500 CAPSULE ORAL 3 TIMES DAILY
Qty: 30 CAPSULE | Refills: 0 | Status: SHIPPED | OUTPATIENT
Start: 2024-04-29 | End: 2024-05-09

## 2024-04-29 RX ORDER — SULFAMETHOXAZOLE AND TRIMETHOPRIM 800; 160 MG/1; MG/1
1 TABLET ORAL 2 TIMES DAILY
Qty: 20 TABLET | Refills: 0 | Status: SHIPPED | OUTPATIENT
Start: 2024-04-29 | End: 2024-05-09

## 2024-04-29 ASSESSMENT — ENCOUNTER SYMPTOMS
RESPIRATORY NEGATIVE: 1
BACK PAIN: 1

## 2024-04-29 NOTE — PROGRESS NOTES
[address] [facility], [address]  [place] [address]  Dept: [phone]  Dept Fax: [phone]    Date of Service:  4/29/2024    Mir Felix is a 79 y.o. male who comes in today for follow up of chronic health issues.     Chief Complaint   Patient presents with    Abscess     2 wk f/u abscess on back, no better, wants all of back checked        Diagnoses / Plan:   1. Wound of left side of back, subsequent encounter  -     cephALEXin (KEFLEX) 500 MG capsule; Take 1 capsule by mouth 3 times daily for 10 days, Disp-30 capsule, R-0Normal  -     sulfamethoxazole-trimethoprim (BACTRIM DS;SEPTRA DS) 800-160 MG per tablet; Take 1 tablet by mouth 2 times daily for 10 days, Disp-20 tablet, R-0Normal  -     Amb External Referral To General Surgery  2. Changing skin lesion  -     Amb External Referral To General Surgery     Start antibiotics as discussed. Referral to general surgery (wound care) for further evaluation of wound and lesion to right upper back. Instructed to avoid getting the dressing wet. Follow up in 2 days for wound check and re-pack wound. Discussed medication desired effects and potential side effects. Patient verbalizes understanding regarding plan of care and all questions answered.    Return in about 2 days (around 5/1/2024), or wound check.     Subjective:   History of Present Illness:  Presents for wound check. He was placed on Bactrim 2 weeks ago for abscess to the upper back. Went on vacation, now back for routine wound check. He reports having increased pain to right shoulder blade. Completed the antibiotic.     Wound Check  He was originally treated more than 14 days ago. Previous treatment included oral antibiotics. Maximum temperature: NA. There has been colored (thick yellow) discharge from the wound. The redness has not changed. The swelling has improved. The pain has not changed. He has no difficulty moving the affected extremity or digit.

## 2024-05-01 ENCOUNTER — OFFICE VISIT (OUTPATIENT)
Dept: FAMILY MEDICINE CLINIC | Age: 79
End: 2024-05-01
Payer: MEDICARE

## 2024-05-01 VITALS
BODY MASS INDEX: 32.56 KG/M2 | HEART RATE: 75 BPM | WEIGHT: 212.6 LBS | SYSTOLIC BLOOD PRESSURE: 150 MMHG | OXYGEN SATURATION: 97 % | DIASTOLIC BLOOD PRESSURE: 86 MMHG

## 2024-05-01 DIAGNOSIS — Z51.89 WOUND CHECK, ABSCESS: Primary | ICD-10-CM

## 2024-05-01 DIAGNOSIS — E11.8 TYPE 2 DIABETES MELLITUS WITH COMPLICATION (HCC): ICD-10-CM

## 2024-05-01 PROCEDURE — 3079F DIAST BP 80-89 MM HG: CPT | Performed by: NURSE PRACTITIONER

## 2024-05-01 PROCEDURE — G8427 DOCREV CUR MEDS BY ELIG CLIN: HCPCS | Performed by: NURSE PRACTITIONER

## 2024-05-01 PROCEDURE — 99213 OFFICE O/P EST LOW 20 MIN: CPT | Performed by: NURSE PRACTITIONER

## 2024-05-01 PROCEDURE — 1036F TOBACCO NON-USER: CPT | Performed by: NURSE PRACTITIONER

## 2024-05-01 PROCEDURE — 3077F SYST BP >= 140 MM HG: CPT | Performed by: NURSE PRACTITIONER

## 2024-05-01 PROCEDURE — G8417 CALC BMI ABV UP PARAM F/U: HCPCS | Performed by: NURSE PRACTITIONER

## 2024-05-01 PROCEDURE — 1123F ACP DISCUSS/DSCN MKR DOCD: CPT | Performed by: NURSE PRACTITIONER

## 2024-05-02 ENCOUNTER — OFFICE VISIT (OUTPATIENT)
Dept: FAMILY MEDICINE CLINIC | Age: 79
End: 2024-05-02
Payer: MEDICARE

## 2024-05-02 VITALS — DIASTOLIC BLOOD PRESSURE: 60 MMHG | SYSTOLIC BLOOD PRESSURE: 130 MMHG | OXYGEN SATURATION: 95 % | HEART RATE: 60 BPM

## 2024-05-02 DIAGNOSIS — I10 ESSENTIAL HYPERTENSION: ICD-10-CM

## 2024-05-02 DIAGNOSIS — K21.9 GASTROESOPHAGEAL REFLUX DISEASE WITHOUT ESOPHAGITIS: ICD-10-CM

## 2024-05-02 DIAGNOSIS — J45.30 MILD PERSISTENT ASTHMA WITHOUT COMPLICATION: ICD-10-CM

## 2024-05-02 DIAGNOSIS — H35.3231 EXUDATIVE AGE-RELATED MACULAR DEGENERATION OF BOTH EYES WITH ACTIVE CHOROIDAL NEOVASCULARIZATION (HCC): ICD-10-CM

## 2024-05-02 DIAGNOSIS — M06.9 RHEUMATOID ARTHRITIS, INVOLVING UNSPECIFIED SITE, UNSPECIFIED WHETHER RHEUMATOID FACTOR PRESENT (HCC): ICD-10-CM

## 2024-05-02 DIAGNOSIS — E78.2 MIXED HYPERLIPIDEMIA: ICD-10-CM

## 2024-05-02 DIAGNOSIS — Z51.89 WOUND CHECK, ABSCESS: ICD-10-CM

## 2024-05-02 DIAGNOSIS — E11.8 TYPE 2 DIABETES MELLITUS WITH COMPLICATION (HCC): Primary | ICD-10-CM

## 2024-05-02 DIAGNOSIS — C61 MALIGNANT TUMOR OF PROSTATE (HCC): ICD-10-CM

## 2024-05-02 PROCEDURE — 99214 OFFICE O/P EST MOD 30 MIN: CPT | Performed by: NURSE PRACTITIONER

## 2024-05-02 PROCEDURE — 3075F SYST BP GE 130 - 139MM HG: CPT | Performed by: NURSE PRACTITIONER

## 2024-05-02 PROCEDURE — 3078F DIAST BP <80 MM HG: CPT | Performed by: NURSE PRACTITIONER

## 2024-05-02 PROCEDURE — G8427 DOCREV CUR MEDS BY ELIG CLIN: HCPCS | Performed by: NURSE PRACTITIONER

## 2024-05-02 PROCEDURE — 1036F TOBACCO NON-USER: CPT | Performed by: NURSE PRACTITIONER

## 2024-05-02 PROCEDURE — G8417 CALC BMI ABV UP PARAM F/U: HCPCS | Performed by: NURSE PRACTITIONER

## 2024-05-02 PROCEDURE — 1123F ACP DISCUSS/DSCN MKR DOCD: CPT | Performed by: NURSE PRACTITIONER

## 2024-05-02 NOTE — PROGRESS NOTES
[address], [address]  [place], [place] [address]  Dept: [phone]  Dept Fax: [phone]    Date of Service:  5/2/2024    Chief Complaint   Patient presents with    Abscess     Here for f/u on back abscess        Diagnoses / Plan:   1. Type 2 diabetes mellitus with complication (HCC)  Assessment & Plan:   Well-controlled, continue current medications  2. Essential hypertension  Assessment & Plan:   Well-controlled, continue current medications  3. Wound check, abscess  4. Mixed hyperlipidemia  Assessment & Plan:  Doing well with statin.  5. Rheumatoid arthritis, involving unspecified site, unspecified whether rheumatoid factor present (HCC)  Assessment & Plan:  Appears stable  6. Malignant tumor of prostate (HCC)  Assessment & Plan:   Monitored by specialist- no acute findings meriting change in the plan  7. Gastroesophageal reflux disease without esophagitis  Assessment & Plan:   Well-controlled, continue current medications  8. 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 Mild persistent asthma without complication  Assessment & Plan:   Well-controlled, continue current medications     Continue antibiotic, keep scheduled appointment with general surgery (wound care) for further evaluation. Avoid getting the dressing wet. Follow up on Monday for wound check and re-pack wound.     Chronic health conditions are stable and controlled on current prescribed medical therapy.  Reviewed and interpreted all relevant labs. Discussed medication desired effects and potential side effects. Non pharmacological interventions such as following a diet low-carb, low-salt diet, increased vegetables and fruit discussed.      Educated on importance of physical activity.  Patient verbalizes understanding regarding plan of care and

## 2024-05-06 ENCOUNTER — OFFICE VISIT (OUTPATIENT)
Dept: FAMILY MEDICINE CLINIC | Age: 79
End: 2024-05-06
Payer: MEDICARE

## 2024-05-06 VITALS
DIASTOLIC BLOOD PRESSURE: 72 MMHG | SYSTOLIC BLOOD PRESSURE: 120 MMHG | WEIGHT: 214.2 LBS | BODY MASS INDEX: 32.81 KG/M2 | HEART RATE: 61 BPM | OXYGEN SATURATION: 97 %

## 2024-05-06 DIAGNOSIS — E11.9 TYPE 2 DIABETES MELLITUS WITHOUT COMPLICATION, WITHOUT LONG-TERM CURRENT USE OF INSULIN (HCC): ICD-10-CM

## 2024-05-06 DIAGNOSIS — Z51.89 WOUND CHECK, ABSCESS: Primary | ICD-10-CM

## 2024-05-06 PROCEDURE — 99213 OFFICE O/P EST LOW 20 MIN: CPT | Performed by: NURSE PRACTITIONER

## 2024-05-06 PROCEDURE — 99212 OFFICE O/P EST SF 10 MIN: CPT | Performed by: NURSE PRACTITIONER

## 2024-05-06 PROCEDURE — 3078F DIAST BP <80 MM HG: CPT | Performed by: NURSE PRACTITIONER

## 2024-05-06 PROCEDURE — G8427 DOCREV CUR MEDS BY ELIG CLIN: HCPCS | Performed by: NURSE PRACTITIONER

## 2024-05-06 PROCEDURE — 1123F ACP DISCUSS/DSCN MKR DOCD: CPT | Performed by: NURSE PRACTITIONER

## 2024-05-06 PROCEDURE — 1036F TOBACCO NON-USER: CPT | Performed by: NURSE PRACTITIONER

## 2024-05-06 PROCEDURE — G8417 CALC BMI ABV UP PARAM F/U: HCPCS | Performed by: NURSE PRACTITIONER

## 2024-05-06 PROCEDURE — 3074F SYST BP LT 130 MM HG: CPT | Performed by: NURSE PRACTITIONER

## 2024-05-07 LAB
ALBUMIN/GLOBULIN RATIO: 1.3 G/DL
ALBUMIN: 4 G/DL (ref 3.5–5)
ALP BLD-CCNC: 98 UNITS/L (ref 38–126)
ALT SERPL-CCNC: 16 UNITS/L (ref 4–50)
ANION GAP SERPL CALCULATED.3IONS-SCNC: 16.9 MMOL/L (ref 3–11)
AST SERPL-CCNC: 22 UNITS/L (ref 17–59)
BASOPHILS ABSOLUTE: 0.11 (ref 0–0.3)
BASOPHILS RELATIVE PERCENT: 1.91 (ref 0–3)
BILIRUB SERPL-MCNC: 0.5 MG/DL (ref 0.2–1.3)
BUN BLDV-MCNC: 18 MG/DL (ref 9–20)
CALCIUM SERPL-MCNC: 9.1 MG/DL (ref 8.4–10.2)
CHLORIDE BLD-SCNC: 107 MMOL/L (ref 98–120)
CHOLESTEROL, TOTAL: 156 MG/DL (ref 50–200)
CHOLESTEROL/HDL RATIO: 3 RATIO (ref 0–4.5)
CO2: 21 MMOL/L (ref 22–31)
CREAT SERPL-MCNC: 1.8 MG/DL (ref 0.7–1.3)
CREATININE, RANDOM URINE: 135.2 MG/DL (ref 20–370)
DIAGNOSTIC PSA: < 0.06 NG/ML (ref 0–4)
EOSINOPHILS ABSOLUTE: 0.17 (ref 0–1.1)
EOSINOPHILS RELATIVE PERCENT: 3.08 (ref 0–10)
GFR, ESTIMATED: 38.7
GLOBULIN: 3.1 G/DL
GLUCOSE: 138 MG/DL (ref 75–110)
HCT VFR BLD CALC: 42.9 % (ref 42–52)
HDLC SERPL-MCNC: 53 MG/DL (ref 36–68)
HEMOGLOBIN: 13.2 (ref 13.8–17.8)
LDL CHOLESTEROL: 76.4 MG/DL (ref 0–160)
LYMPHOCYTES ABSOLUTE: 2.06 (ref 1–5.5)
LYMPHOCYTES RELATIVE PERCENT: 36.95 (ref 20–51.1)
MCH RBC QN AUTO: 27.5 PG (ref 28.5–32.5)
MCHC RBC AUTO-ENTMCNC: 30.7 G/DL (ref 32–37)
MCV RBC AUTO: 89.5 FL (ref 80–94)
MICROALBUMIN UR-MCNC: 0.7 MG/DL (ref 0–1.7)
MICROALBUMIN/CREAT UR-RTO: 5.17
MONOCYTES ABSOLUTE: 0.69 (ref 0.1–1)
MONOCYTES RELATIVE PERCENT: 12.44 (ref 1.7–9.3)
NEUTROPHILS ABSOLUTE: 2.54 (ref 2–8.1)
NEUTROPHILS RELATIVE PERCENT: 45.62 (ref 42.2–75.2)
PDW BLD-RTO: 12.9 % (ref 10–15.5)
PLATELET # BLD: 159.4 THOU/MM3 (ref 130–400)
POTASSIUM SERPL-SCNC: 4.9 MMOL/L (ref 3.6–5)
RBC # BLD: 4.79 M/UL (ref 4.7–6.1)
SODIUM BLD-SCNC: 140 MMOL/L (ref 135–145)
TOTAL PROTEIN: 7.2 G/DL (ref 6.3–8.2)
TRIGL SERPL-MCNC: 133 MG/DL (ref 10–250)
VLDLC SERPL CALC-MCNC: 27 MG/DL (ref 0–50)
WBC # BLD: 5.6 THOU/ML3 (ref 4.8–10.8)

## 2024-05-07 NOTE — PROGRESS NOTES
[address], [address]  [place] [address]  Dept: [phone]  Dept Fax: [phone]    Date of Service:  5/6/2024    Mir Felix is a 79 y.o. male who comes in today for follow up of chronic health issues.     Chief Complaint   Patient presents with    Wound Check     Repack wound        Diagnoses / Plan:   1. Wound check, abscess  2. Type 2 diabetes mellitus without complication, without long-term current use of insulin (HCC)     Continue antibiotic, keep scheduled appointment with general surgery (wound care) for further evaluation. Avoid getting the dressing wet. All questions answered. Patient verbalizes understanding regarding plan of care and all questions answered.    Return if symptoms worsen or fail to improve.     Subjective:   History of Present Illness:  Presents for wound check, reports feeling much better.     Wound Check  He was originally treated more than 14 days ago. Previous treatment included oral antibiotics and wound cleansing or irrigation. The redness has improved. The swelling has improved. The pain has improved.        BP Readings from Last 3 Encounters:   05/06/24 120/72   05/02/24 130/60   05/01/24 (!) 150/86       Pulse Readings from Last 3 Encounters:   05/06/24 61   05/02/24 60   05/01/24 75        Wt Readings from Last 3 Encounters:   05/06/24 97.2 kg (214 lb 3.2 oz)   05/01/24 96.4 kg (212 lb 9.6 oz)   04/29/24 96.2 kg (212 lb)        Current Outpatient Medications   Medication Sig Dispense Refill    cephALEXin (KEFLEX) 500 MG capsule Take 1 capsule by mouth 3 times daily for 10 days 30 capsule 0    sulfamethoxazole-trimethoprim (BACTRIM DS;SEPTRA DS) 800-160 MG per tablet Take 1 tablet by mouth 2 times daily for 10 days 20 tablet 0    JANUVIA 100 MG tablet TAKE 1 TABLET EVERY DAY 90 tablet 3    metFORMIN (GLUCOPHAGE) 1000 MG tablet TAKE 1 TABLET TWICE DAILY WITH MEALS 180 tablet 0    DULoxetine (CYMBALTA) 30 MG extended

## 2024-05-14 ENCOUNTER — TELEPHONE (OUTPATIENT)
Dept: FAMILY MEDICINE CLINIC | Age: 79
End: 2024-05-14

## 2024-05-19 PROBLEM — M79.89 MASS OF SOFT TISSUE OF NECK: Status: RESOLVED | Noted: 2023-01-10 | Resolved: 2024-05-19

## 2024-08-15 ENCOUNTER — OFFICE VISIT (OUTPATIENT)
Dept: FAMILY MEDICINE CLINIC | Age: 79
End: 2024-08-15
Payer: MEDICARE

## 2024-08-15 VITALS
DIASTOLIC BLOOD PRESSURE: 58 MMHG | WEIGHT: 214 LBS | BODY MASS INDEX: 32.78 KG/M2 | SYSTOLIC BLOOD PRESSURE: 134 MMHG | OXYGEN SATURATION: 95 % | HEART RATE: 80 BPM

## 2024-08-15 DIAGNOSIS — M54.50 CHRONIC BILATERAL LOW BACK PAIN WITHOUT SCIATICA: ICD-10-CM

## 2024-08-15 DIAGNOSIS — R79.89 ELEVATED SERUM CREATININE: ICD-10-CM

## 2024-08-15 DIAGNOSIS — G89.29 CHRONIC BILATERAL LOW BACK PAIN WITHOUT SCIATICA: ICD-10-CM

## 2024-08-15 DIAGNOSIS — Z79.899 HIGH RISK MEDICATION USE: ICD-10-CM

## 2024-08-15 DIAGNOSIS — E11.9 TYPE 2 DIABETES MELLITUS WITHOUT COMPLICATION, WITHOUT LONG-TERM CURRENT USE OF INSULIN (HCC): Primary | ICD-10-CM

## 2024-08-15 DIAGNOSIS — Z85.46 HISTORY OF PROSTATE CANCER: ICD-10-CM

## 2024-08-15 LAB
ALBUMIN/GLOBULIN RATIO: 1.4 G/DL
ALBUMIN: 4.1 G/DL (ref 3.5–5)
ALP BLD-CCNC: 85 UNITS/L (ref 38–126)
ALT SERPL-CCNC: 15 UNITS/L (ref 4–50)
ANION GAP SERPL CALCULATED.3IONS-SCNC: 12.4 MMOL/L (ref 3–11)
AST SERPL-CCNC: 19 UNITS/L (ref 17–59)
BILIRUB SERPL-MCNC: 0.5 MG/DL (ref 0.2–1.3)
BUN BLDV-MCNC: 19 MG/DL (ref 9–20)
C-REACTIVE PROTEIN: < 0.5 MG/DL (ref 0–1)
CALCIUM SERPL-MCNC: 9.3 MG/DL (ref 8.4–10.2)
CHLORIDE BLD-SCNC: 105 MMOL/L (ref 98–120)
CO2: 24 MMOL/L (ref 22–31)
CREAT SERPL-MCNC: 1.5 MG/DL (ref 0.7–1.3)
DIAGNOSTIC PSA: < 0.06 NG/ML (ref 0–4)
GFR, ESTIMATED: 47.7
GLOBULIN: 3 G/DL
GLUCOSE: 190 MG/DL (ref 75–110)
POTASSIUM SERPL-SCNC: 4.6 MMOL/L (ref 3.6–5)
SED RATE, AUTOMATED: 19 MM/HR (ref 0–15)
SODIUM BLD-SCNC: 141 MMOL/L (ref 135–145)
TOTAL PROTEIN: 7 G/DL (ref 6.3–8.2)

## 2024-08-15 PROCEDURE — 3075F SYST BP GE 130 - 139MM HG: CPT | Performed by: STUDENT IN AN ORGANIZED HEALTH CARE EDUCATION/TRAINING PROGRAM

## 2024-08-15 PROCEDURE — G8417 CALC BMI ABV UP PARAM F/U: HCPCS | Performed by: STUDENT IN AN ORGANIZED HEALTH CARE EDUCATION/TRAINING PROGRAM

## 2024-08-15 PROCEDURE — G8427 DOCREV CUR MEDS BY ELIG CLIN: HCPCS | Performed by: STUDENT IN AN ORGANIZED HEALTH CARE EDUCATION/TRAINING PROGRAM

## 2024-08-15 PROCEDURE — 99214 OFFICE O/P EST MOD 30 MIN: CPT | Performed by: STUDENT IN AN ORGANIZED HEALTH CARE EDUCATION/TRAINING PROGRAM

## 2024-08-15 PROCEDURE — 1036F TOBACCO NON-USER: CPT | Performed by: STUDENT IN AN ORGANIZED HEALTH CARE EDUCATION/TRAINING PROGRAM

## 2024-08-15 PROCEDURE — 1123F ACP DISCUSS/DSCN MKR DOCD: CPT | Performed by: STUDENT IN AN ORGANIZED HEALTH CARE EDUCATION/TRAINING PROGRAM

## 2024-08-15 PROCEDURE — 3078F DIAST BP <80 MM HG: CPT | Performed by: STUDENT IN AN ORGANIZED HEALTH CARE EDUCATION/TRAINING PROGRAM

## 2024-08-15 NOTE — PROGRESS NOTES
[address], [address]  [place] [address]  Phone: [phone]  Fax: [phone]      Date of Visit:  8/15/24  Patient Name: Mir Felix   Patient :  1945     ASSESSMENT/PLAN     1. Type 2 diabetes mellitus without complication, without long-term current use of insulin (HCC)  -     metFORMIN (GLUCOPHAGE) 500 MG tablet; Take 1 tablet by mouth 2 times daily (with meals) TAKE 1 TABLET TWICE DAILY WITH MEALS, Disp-180 tablet, R-1Normal  2. Elevated serum creatinine  3. Chronic bilateral low back pain without sciatica  4. History of prostate cancer    Type 2 diabetes-patient is on Januvia and metformin at max dose.  Most recent renal function in May 2024 demonstrated GFR of 38.7.  Given this, will decrease metformin due to renal function dose adjustment necessary to 500 mg twice daily.  Will repeat renal function.  Do note that Januvia is also on the cusp of needing renal adjusted.  Will further titrate medications next week once patient returns for appointment with laboratory review.    For patient's relatively newer onset back pain within the last week, will order x-ray and PSA.  Patient has a history of prostate cancer in which she underwent radiation.  His prostate was not removed.  He no longer follows with urology but was told he was in remission.  Low threshold for further advanced imaging without improvement.  Exercises given to patient for chronic lower back pain.  Physical therapy discussed as well, possible referral next week upon follow-up.  Patient has been using high-dose aspirin, given renal function this has been advised against.  Advised acetaminophen 1000 mg every 6 hours for max dose of 3 g daily.  Also recommended lidocaine patch at pharmacy.    Follow up in 1 week for lab review    - Questions/concerns answered. Patient verbalized and expressed understanding. Medications, laboratory testing, imaging, consultation, and

## 2024-08-15 NOTE — PATIENT INSTRUCTIONS
You can take Tylenol 1000mg every 6 hours (ONLY acetaminophen as the active ingredient).  You can also try lidocaine patches (4% is over the counter) for the back.  Try some of the exercises for your back.  I am printing off an xray order for you. Get done before our follow up next week also.  Get your labs done and follow up in 1 week to discuss kidney function.  Decrease your metformin dose to 500mg twice daily with meals.

## 2024-08-23 ENCOUNTER — OFFICE VISIT (OUTPATIENT)
Dept: FAMILY MEDICINE CLINIC | Age: 79
End: 2024-08-23
Payer: MEDICARE

## 2024-08-23 VITALS
BODY MASS INDEX: 32.47 KG/M2 | HEART RATE: 59 BPM | WEIGHT: 212 LBS | OXYGEN SATURATION: 97 % | SYSTOLIC BLOOD PRESSURE: 138 MMHG | DIASTOLIC BLOOD PRESSURE: 78 MMHG

## 2024-08-23 DIAGNOSIS — M54.50 CHRONIC BILATERAL LOW BACK PAIN WITHOUT SCIATICA: ICD-10-CM

## 2024-08-23 DIAGNOSIS — I12.9 HYPERTENSIVE KIDNEY DISEASE WITH STAGE 3A CHRONIC KIDNEY DISEASE (HCC): Primary | ICD-10-CM

## 2024-08-23 DIAGNOSIS — N18.31 HYPERTENSIVE KIDNEY DISEASE WITH STAGE 3A CHRONIC KIDNEY DISEASE (HCC): Primary | ICD-10-CM

## 2024-08-23 DIAGNOSIS — E11.8 TYPE 2 DIABETES MELLITUS WITH COMPLICATION (HCC): ICD-10-CM

## 2024-08-23 DIAGNOSIS — Z87.891 FORMER SMOKER: ICD-10-CM

## 2024-08-23 DIAGNOSIS — G89.29 CHRONIC BILATERAL LOW BACK PAIN WITHOUT SCIATICA: ICD-10-CM

## 2024-08-23 PROBLEM — I10 ESSENTIAL HYPERTENSION: Status: RESOLVED | Noted: 2020-03-26 | Resolved: 2024-08-23

## 2024-08-23 PROCEDURE — 99214 OFFICE O/P EST MOD 30 MIN: CPT | Performed by: STUDENT IN AN ORGANIZED HEALTH CARE EDUCATION/TRAINING PROGRAM

## 2024-08-23 PROCEDURE — 1036F TOBACCO NON-USER: CPT | Performed by: STUDENT IN AN ORGANIZED HEALTH CARE EDUCATION/TRAINING PROGRAM

## 2024-08-23 PROCEDURE — G8427 DOCREV CUR MEDS BY ELIG CLIN: HCPCS | Performed by: STUDENT IN AN ORGANIZED HEALTH CARE EDUCATION/TRAINING PROGRAM

## 2024-08-23 PROCEDURE — G8417 CALC BMI ABV UP PARAM F/U: HCPCS | Performed by: STUDENT IN AN ORGANIZED HEALTH CARE EDUCATION/TRAINING PROGRAM

## 2024-08-23 PROCEDURE — 1123F ACP DISCUSS/DSCN MKR DOCD: CPT | Performed by: STUDENT IN AN ORGANIZED HEALTH CARE EDUCATION/TRAINING PROGRAM

## 2024-08-23 PROCEDURE — 99212 OFFICE O/P EST SF 10 MIN: CPT | Performed by: STUDENT IN AN ORGANIZED HEALTH CARE EDUCATION/TRAINING PROGRAM

## 2024-08-23 NOTE — PROGRESS NOTES
[address], [address]  [place] [address]  Phone: [phone]  Fax: [phone]      Date of Visit:  24  Patient Name: Mir Felix   Patient :  1945     ASSESSMENT/PLAN     1. Hypertensive kidney disease with stage 3a chronic kidney disease (HCC)  -     empagliflozin (JARDIANCE) 10 MG tablet; Take 1 tablet by mouth daily, Disp-90 tablet, R-1Normal  -     Comprehensive Metabolic Panel; Future  2. Type 2 diabetes mellitus with complication (HCC)  -     Hemoglobin A1C; Future  3. Chronic bilateral low back pain without sciatica    Recent GFR confirms CKD 3A.  Will initiate Jardiance at 10 mg dosing and discontinue patient's Januvia.  May need to increase to 25mg if tolerates to control diabetes adequately.  Will repeat A1c when due next in November.  Blood pressure controlled today, will continue lisinopril 20 mg dosing.    Significant improvement in back pain.  Continue conservative measures.  Exercises provided at last office visit.  Physical therapy referral offered, patient will call the office and notify if he would like in the future.    Follow up in 3 months for diabetes    - Questions/concerns answered. Patient verbalized and expressed understanding. Medications, laboratory testing, imaging, consultation, and follow up as documented in this encounter.       HPI:     Mir Felix is a 79 y.o. male with   Patient Active Problem List   Diagnosis    Gastroesophageal reflux disease without esophagitis    Hyperlipidemia    Mild persistent asthma without complication    Rheumatoid arthritis (HCC)    Malignant tumor of prostate (HCC)    Type 2 diabetes mellitus with complication (HCC)    Chronic right shoulder pain    Exudative age-related macular degeneration of both eyes with active choroidal neovascularization (HCC)    Adhesive capsulitis of shoulder     who presents today to discuss   Chief Complaint   Patient presents with          General: No deformity.      Cervical back: Normal range of motion and neck supple.      Right lower leg: No edema.      Left lower leg: No edema.      Comments: Decreased active back flexion and extension secondary to pain, paraspinal muscles tender to palpation, straight leg test negative bilaterally, 5 out of 5 strength lower extremities   Skin:     General: Skin is warm and dry.      Findings: No lesion.   Neurological:      General: No focal deficit present.      Mental Status: He is alert and oriented to person, place, and time.   Psychiatric:         Mood and Affect: Mood normal.         Behavior: Behavior normal.           Please be aware portions of this note were completed using voice recognition software and unforeseen errors may have occurred    I personally reviewed the patient's past medical history, current medications, allergies, surgical history, family history and social history.  Updates were made as necessary.    Electronically signed by Deuce Rees MD on 8/29/2024 at 10:08 AM

## 2024-08-23 NOTE — PATIENT INSTRUCTIONS
Take the new medication Jardiance and discontinue the Januvia.  We will repeat labs in 3 months and follow up in 3 months.  Keep doing what you're doing for the back. If the pain worsens again, we can consider a formal referral to physical therapy.

## 2024-10-02 DIAGNOSIS — E78.5 HYPERLIPIDEMIA, UNSPECIFIED HYPERLIPIDEMIA TYPE: ICD-10-CM

## 2024-10-02 DIAGNOSIS — C61 MALIGNANT TUMOR OF PROSTATE (HCC): ICD-10-CM

## 2024-10-02 DIAGNOSIS — K21.9 GASTROESOPHAGEAL REFLUX DISEASE WITHOUT ESOPHAGITIS: ICD-10-CM

## 2024-10-02 DIAGNOSIS — I10 ESSENTIAL HYPERTENSION: ICD-10-CM

## 2024-10-02 RX ORDER — LISINOPRIL 20 MG/1
TABLET ORAL
Qty: 90 TABLET | Refills: 1 | Status: SHIPPED | OUTPATIENT
Start: 2024-10-02

## 2024-10-02 RX ORDER — ESOMEPRAZOLE MAGNESIUM 40 MG/1
CAPSULE, DELAYED RELEASE ORAL
Qty: 90 CAPSULE | Refills: 1 | Status: SHIPPED | OUTPATIENT
Start: 2024-10-02

## 2024-10-02 RX ORDER — TAMSULOSIN HYDROCHLORIDE 0.4 MG/1
CAPSULE ORAL
Qty: 90 CAPSULE | Refills: 1 | Status: SHIPPED | OUTPATIENT
Start: 2024-10-02

## 2024-10-02 RX ORDER — DULOXETIN HYDROCHLORIDE 30 MG/1
30 CAPSULE, DELAYED RELEASE ORAL DAILY
Qty: 90 CAPSULE | Refills: 1 | Status: SHIPPED | OUTPATIENT
Start: 2024-10-02

## 2024-10-02 RX ORDER — SIMVASTATIN 40 MG
TABLET ORAL
Qty: 90 TABLET | Refills: 1 | Status: SHIPPED | OUTPATIENT
Start: 2024-10-02

## 2024-10-02 NOTE — TELEPHONE ENCOUNTER
Mir Felix is calling to request a refill on the following medication(s):  Requested Prescriptions     Pending Prescriptions Disp Refills    DULoxetine (CYMBALTA) 30 MG extended release capsule [Pharmacy Med Name: DULoxetine HCl Oral Capsule Delayed Release Particles 30 MG] 90 capsule 3     Sig: TAKE 1 CAPSULE EVERY DAY    simvastatin (ZOCOR) 40 MG tablet [Pharmacy Med Name: Simvastatin Oral Tablet 40 MG] 90 tablet 3     Sig: TAKE 1 TABLET EVERY NIGHT    esomeprazole (NEXIUM) 40 MG delayed release capsule [Pharmacy Med Name: Esomeprazole Magnesium Oral Capsule Delayed Release 40 MG] 90 capsule 3     Sig: TAKE 1 CAPSULE EVERY MORNING BEFORE BREAKFAST    lisinopril (PRINIVIL;ZESTRIL) 20 MG tablet [Pharmacy Med Name: Lisinopril Oral Tablet 20 MG] 90 tablet 3     Sig: TAKE 1 TABLET EVERY DAY    tamsulosin (FLOMAX) 0.4 MG capsule [Pharmacy Med Name: Tamsulosin HCl Oral Capsule 0.4 MG] 90 capsule 3     Sig: TAKE 1 CAPSULE EVERY DAY       Last Visit Date (If Applicable):  8/23/2024    Next Visit Date:    11/25/2024

## 2024-11-19 LAB
ALBUMIN/GLOBULIN RATIO: 1.4 G/DL
ALBUMIN: 4 G/DL (ref 3.5–5)
ALP BLD-CCNC: 89 UNITS/L (ref 38–126)
ALT SERPL-CCNC: 15 UNITS/L (ref 4–50)
ANION GAP SERPL CALCULATED.3IONS-SCNC: 10.9 MMOL/L (ref 3–11)
AST SERPL-CCNC: 21 UNITS/L (ref 17–59)
BILIRUB SERPL-MCNC: 0.6 MG/DL (ref 0.2–1.3)
BUN BLDV-MCNC: 15 MG/DL (ref 9–20)
CALCIUM SERPL-MCNC: 9 MG/DL (ref 8.4–10.2)
CHLORIDE BLD-SCNC: 106 MMOL/L (ref 98–120)
CO2: 22 MMOL/L (ref 22–31)
CREAT SERPL-MCNC: 1.3 MG/DL (ref 0.7–1.3)
GFR, ESTIMATED: 56.3
GLOBULIN: 2.9 G/DL
GLUCOSE: 242 MG/DL (ref 75–110)
HBA1C MFR BLD: 7.7 % (ref 4.4–6.4)
POTASSIUM SERPL-SCNC: 3.8 MMOL/L (ref 3.6–5)
SODIUM BLD-SCNC: 139 MMOL/L (ref 135–145)
TOTAL PROTEIN: 6.9 G/DL (ref 6.3–8.2)

## 2024-11-25 ENCOUNTER — TELEPHONE (OUTPATIENT)
Dept: FAMILY MEDICINE CLINIC | Age: 79
End: 2024-11-25

## 2024-11-25 ENCOUNTER — OFFICE VISIT (OUTPATIENT)
Dept: FAMILY MEDICINE CLINIC | Age: 79
End: 2024-11-25
Payer: MEDICARE

## 2024-11-25 VITALS
WEIGHT: 197 LBS | OXYGEN SATURATION: 97 % | DIASTOLIC BLOOD PRESSURE: 86 MMHG | HEIGHT: 67 IN | BODY MASS INDEX: 30.92 KG/M2 | HEART RATE: 56 BPM | SYSTOLIC BLOOD PRESSURE: 138 MMHG

## 2024-11-25 DIAGNOSIS — E11.8 TYPE 2 DIABETES MELLITUS WITH COMPLICATION (HCC): Primary | ICD-10-CM

## 2024-11-25 DIAGNOSIS — N18.31 HYPERTENSIVE KIDNEY DISEASE WITH STAGE 3A CHRONIC KIDNEY DISEASE (HCC): ICD-10-CM

## 2024-11-25 DIAGNOSIS — I12.9 HYPERTENSIVE KIDNEY DISEASE WITH STAGE 3A CHRONIC KIDNEY DISEASE (HCC): ICD-10-CM

## 2024-11-25 PROCEDURE — G8427 DOCREV CUR MEDS BY ELIG CLIN: HCPCS | Performed by: STUDENT IN AN ORGANIZED HEALTH CARE EDUCATION/TRAINING PROGRAM

## 2024-11-25 PROCEDURE — 1123F ACP DISCUSS/DSCN MKR DOCD: CPT | Performed by: STUDENT IN AN ORGANIZED HEALTH CARE EDUCATION/TRAINING PROGRAM

## 2024-11-25 PROCEDURE — 99212 OFFICE O/P EST SF 10 MIN: CPT | Performed by: STUDENT IN AN ORGANIZED HEALTH CARE EDUCATION/TRAINING PROGRAM

## 2024-11-25 PROCEDURE — 1159F MED LIST DOCD IN RCRD: CPT | Performed by: STUDENT IN AN ORGANIZED HEALTH CARE EDUCATION/TRAINING PROGRAM

## 2024-11-25 PROCEDURE — 1036F TOBACCO NON-USER: CPT | Performed by: STUDENT IN AN ORGANIZED HEALTH CARE EDUCATION/TRAINING PROGRAM

## 2024-11-25 PROCEDURE — G8417 CALC BMI ABV UP PARAM F/U: HCPCS | Performed by: STUDENT IN AN ORGANIZED HEALTH CARE EDUCATION/TRAINING PROGRAM

## 2024-11-25 PROCEDURE — 3051F HG A1C>EQUAL 7.0%<8.0%: CPT | Performed by: STUDENT IN AN ORGANIZED HEALTH CARE EDUCATION/TRAINING PROGRAM

## 2024-11-25 PROCEDURE — 99214 OFFICE O/P EST MOD 30 MIN: CPT | Performed by: STUDENT IN AN ORGANIZED HEALTH CARE EDUCATION/TRAINING PROGRAM

## 2024-11-25 PROCEDURE — G8482 FLU IMMUNIZE ORDER/ADMIN: HCPCS | Performed by: STUDENT IN AN ORGANIZED HEALTH CARE EDUCATION/TRAINING PROGRAM

## 2024-11-25 SDOH — ECONOMIC STABILITY: FOOD INSECURITY: WITHIN THE PAST 12 MONTHS, YOU WORRIED THAT YOUR FOOD WOULD RUN OUT BEFORE YOU GOT MONEY TO BUY MORE.: NEVER TRUE

## 2024-11-25 SDOH — ECONOMIC STABILITY: INCOME INSECURITY: HOW HARD IS IT FOR YOU TO PAY FOR THE VERY BASICS LIKE FOOD, HOUSING, MEDICAL CARE, AND HEATING?: NOT HARD AT ALL

## 2024-11-25 SDOH — ECONOMIC STABILITY: FOOD INSECURITY: WITHIN THE PAST 12 MONTHS, THE FOOD YOU BOUGHT JUST DIDN'T LAST AND YOU DIDN'T HAVE MONEY TO GET MORE.: NEVER TRUE

## 2024-11-25 NOTE — TELEPHONE ENCOUNTER
Called [facility] pharmacy on changing jardiance dosing and pricing. Pt would have to pay $441.21 for a 90 day supply if we fill before the first of the year because he is currently in a coverage gap. LM for pt to see what he would like to do. In the meantime pt needs to be taking 2 jardiance a day.

## 2024-11-25 NOTE — PROGRESS NOTES
[address], [address]  [place] [address]  Phone: [phone]  Fax: [phone]      Date of Visit:  24  Patient Name: Mir Felix   Patient :  1945     ASSESSMENT/PLAN     1. Type 2 diabetes mellitus with complication (HCC)  The following orders have not been finalized:  -     empagliflozin (JARDIANCE) 25 MG tablet  2. Hypertensive kidney disease with stage 3a chronic kidney disease (HCC)     A1c today 7.7.  We will increase his Jardiance to 25 mg from 10 mg.  Follow-up in 3 months with repeat A1c.  Continue metformin.  Is on statin.    Continue lisinopril for blood pressure management.  Blood pressure well-controlled today.    Follow up in 3 months    - Questions/concerns answered. Patient verbalized and expressed understanding. Medications, laboratory testing, imaging, consultation, and follow up as documented in this encounter.       HPI:     Mir Felix is a 79 y.o. 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  He has no acute questions or concerns.  No symptoms.  Has been taking his medications without issue.    Prostate cancer 5 years ago - 45 sessions of radiation; was on hormonal suppressive meds for 2 years    Sees Dr. Curiel    Patient denies any fevers, chills, headaches, visual changes, lightheadedness, dizziness, chest pain, palpitations,

## 2025-01-07 ENCOUNTER — OFFICE VISIT (OUTPATIENT)
Dept: FAMILY MEDICINE CLINIC | Age: 80
End: 2025-01-07
Payer: MEDICARE

## 2025-01-07 VITALS
DIASTOLIC BLOOD PRESSURE: 74 MMHG | SYSTOLIC BLOOD PRESSURE: 128 MMHG | BODY MASS INDEX: 30.53 KG/M2 | WEIGHT: 196.8 LBS | OXYGEN SATURATION: 97 % | HEART RATE: 60 BPM

## 2025-01-07 DIAGNOSIS — E78.2 MIXED HYPERLIPIDEMIA: ICD-10-CM

## 2025-01-07 DIAGNOSIS — E11.8 TYPE 2 DIABETES MELLITUS WITH COMPLICATION (HCC): ICD-10-CM

## 2025-01-07 DIAGNOSIS — Z01.810 ENCOUNTER FOR PRE-OPERATIVE CARDIOVASCULAR CLEARANCE: Primary | ICD-10-CM

## 2025-01-07 DIAGNOSIS — J45.30 MILD PERSISTENT ASTHMA WITHOUT COMPLICATION: ICD-10-CM

## 2025-01-07 DIAGNOSIS — I10 ESSENTIAL HYPERTENSION: ICD-10-CM

## 2025-01-07 DIAGNOSIS — H26.9 CATARACT OF BOTH EYES, UNSPECIFIED CATARACT TYPE: ICD-10-CM

## 2025-01-07 PROCEDURE — 99212 OFFICE O/P EST SF 10 MIN: CPT | Performed by: STUDENT IN AN ORGANIZED HEALTH CARE EDUCATION/TRAINING PROGRAM

## 2025-01-07 ASSESSMENT — PATIENT HEALTH QUESTIONNAIRE - PHQ9
SUM OF ALL RESPONSES TO PHQ9 QUESTIONS 1 & 2: 0
1. LITTLE INTEREST OR PLEASURE IN DOING THINGS: NOT AT ALL
SUM OF ALL RESPONSES TO PHQ QUESTIONS 1-9: 0
2. FEELING DOWN, DEPRESSED OR HOPELESS: NOT AT ALL
SUM OF ALL RESPONSES TO PHQ QUESTIONS 1-9: 0

## 2025-01-07 NOTE — PROGRESS NOTES
[address], [address]  [place] [address]  Phone: [phone]  Fax: [phone]      Date of Visit:  25  Patient Name: Mir Felix   Patient :  1945     ASSESSMENT/PLAN     1. Encounter for pre-operative cardiovascular clearance  2. Type 2 diabetes mellitus with complication (HCC)  3. Mixed hyperlipidemia  4. Mild persistent asthma without complication  5. Essential hypertension  6. Cataract of both eyes, unspecified cataract type     Patient is low risk to proceed with cataract surgery. No medical conditions that need optimization prior to surgery. Instructed to discuss current medications with opthalmology to ensure does not need to hold prior to surgery.      - Questions/concerns answered. Patient verbalized and expressed understanding. Medications, laboratory testing, imaging, consultation, and follow up as documented in this encounter.       HPI:     Mir Felix is a 79 y.o. male with   Patient Active Problem List   Diagnosis    Gastroesophageal reflux disease without esophagitis    Hyperlipidemia    Mild persistent asthma without complication    Rheumatoid arthritis (HCC)    Essential hypertension    Malignant tumor of prostate (HCC)    Type 2 diabetes mellitus with complication (HCC)    Chronic right shoulder pain    Exudative age-related macular degeneration of both eyes with active choroidal neovascularization (HCC)    Adhesive capsulitis of shoulder     who presents today to discuss   Chief Complaint   Patient presents with    Pre-op Exam     Pre op exam for cataract surgery on  on the right eye.        Recent labwork:  Orders Only on 2024   Component Date Value Ref Range Status    Sodium 2024 139  135 - 145 mmol/L Final    Potassium 2024 3.8  3.6 - 5.0 mmol/L Final    Chloride 2024 106  98 - 120 mmol/L Final    CO2 2024 22  22 - 31 mmol/L Final    Anion Gap 2024 10.9  3 - 11

## 2025-01-09 ENCOUNTER — TELEPHONE (OUTPATIENT)
Dept: FAMILY MEDICINE CLINIC | Age: 80
End: 2025-01-09

## 2025-01-09 DIAGNOSIS — E11.9 TYPE 2 DIABETES MELLITUS WITHOUT COMPLICATION, WITHOUT LONG-TERM CURRENT USE OF INSULIN (HCC): ICD-10-CM

## 2025-01-09 NOTE — TELEPHONE ENCOUNTER
Mir Felix is requesting a refill on the following medication(s):  Requested Prescriptions     Pending Prescriptions Disp Refills    metFORMIN (GLUCOPHAGE) 500 MG tablet [Pharmacy Med Name: metFORMIN HCl Oral Tablet 500 MG] 180 tablet 3     Sig: TAKE 1 TABLET TWICE DAILY WITH MEALS       Last Visit Date (If Applicable):  1/7/2025    Next Visit Date:    2/27/2025

## 2025-01-09 NOTE — TELEPHONE ENCOUNTER
Please fax office note to Dr Michelle's office when signed and marked that he is cleared for surgery.  Fax#[phone]

## 2025-02-07 ENCOUNTER — OFFICE VISIT (OUTPATIENT)
Dept: FAMILY MEDICINE CLINIC | Age: 80
End: 2025-02-07
Payer: MEDICARE

## 2025-02-07 VITALS
OXYGEN SATURATION: 95 % | HEART RATE: 50 BPM | SYSTOLIC BLOOD PRESSURE: 134 MMHG | WEIGHT: 200.2 LBS | DIASTOLIC BLOOD PRESSURE: 82 MMHG | BODY MASS INDEX: 31.06 KG/M2

## 2025-02-07 DIAGNOSIS — I49.3 PVC (PREMATURE VENTRICULAR CONTRACTION): ICD-10-CM

## 2025-02-07 DIAGNOSIS — E11.8 TYPE 2 DIABETES MELLITUS WITH COMPLICATION (HCC): Primary | ICD-10-CM

## 2025-02-07 DIAGNOSIS — R01.1 HEART MURMUR: ICD-10-CM

## 2025-02-07 DIAGNOSIS — I44.0 FIRST DEGREE HEART BLOCK: ICD-10-CM

## 2025-02-07 DIAGNOSIS — R00.2 PALPITATIONS: ICD-10-CM

## 2025-02-07 DIAGNOSIS — R00.1 BRADYCARDIA: ICD-10-CM

## 2025-02-07 DIAGNOSIS — E11.9 TYPE 2 DIABETES MELLITUS WITHOUT COMPLICATION, WITHOUT LONG-TERM CURRENT USE OF INSULIN (HCC): ICD-10-CM

## 2025-02-07 LAB
ALBUMIN/GLOBULIN RATIO: 1.3 G/DL
ALBUMIN: 3.7 G/DL (ref 3.5–5)
ALP BLD-CCNC: 84 UNITS/L (ref 38–126)
ALT SERPL-CCNC: 14 UNITS/L (ref 4–50)
ANION GAP SERPL CALCULATED.3IONS-SCNC: 5.1 MMOL/L (ref 3–11)
AST SERPL-CCNC: 21 UNITS/L (ref 17–59)
BASOPHILS ABSOLUTE: 0.09 X10E3/?L (ref 0–0.3)
BASOPHILS RELATIVE PERCENT: 2.15 % (ref 0–3)
BILIRUB SERPL-MCNC: 0.5 MG/DL (ref 0.2–1.3)
BUN BLDV-MCNC: 19 MG/DL (ref 9–20)
CALCIUM SERPL-MCNC: 9.1 MG/DL (ref 8.4–10.2)
CHLORIDE BLD-SCNC: 109 MMOL/L (ref 98–120)
CO2: 25 MMOL/L (ref 22–31)
CREAT SERPL-MCNC: 1.2 MG/DL (ref 0.7–1.3)
EOSINOPHILS ABSOLUTE: 0.12 X10E3/?L (ref 0–1.1)
EOSINOPHILS RELATIVE PERCENT: 2.84 % (ref 0–10)
GFR, ESTIMATED: > 60
GLOBULIN: 2.9 G/DL
GLUCOSE: 165 MG/DL (ref 75–110)
HBA1C MFR BLD: 8.4 %
HCT VFR BLD CALC: 45.2 % (ref 42–52)
HEMOGLOBIN: 14.4 G/DL (ref 13.8–17.8)
LYMPHOCYTES ABSOLUTE: 1.13 X10E3/?L (ref 1–5.5)
LYMPHOCYTES RELATIVE PERCENT: 27.77 % (ref 20–51.1)
MAGNESIUM: 1.8 MG/DL (ref 1.6–2.3)
MCH RBC QN AUTO: 27.9 PG (ref 28.5–32.5)
MCHC RBC AUTO-ENTMCNC: 31.8 G/DL (ref 32–37)
MCV RBC AUTO: 87.8 FL (ref 80–94)
MONOCYTES ABSOLUTE: 0.49 X10E3/?L (ref 0.1–1)
MONOCYTES RELATIVE PERCENT: 12.09 % (ref 1.7–9.3)
NEUTROPHILS ABSOLUTE: 2.25 X10E3/?L (ref 2–8.1)
NEUTROPHILS RELATIVE PERCENT: 55.15 % (ref 42.2–75.2)
PDW BLD-RTO: 12.8 % (ref 10–15.5)
PLATELET # BLD: 138.9 THOU/MM3 (ref 130–400)
POTASSIUM SERPL-SCNC: 4.1 MMOL/L (ref 3.6–5)
RBC # BLD: 5.15 M/UL (ref 4.7–6.1)
SODIUM BLD-SCNC: 139 MMOL/L (ref 135–145)
TOTAL PROTEIN: 6.5 G/DL (ref 6.3–8.2)
TSH REFLEX FT4: 0.59 MIU/ML (ref 0.49–4.67)
WBC # BLD: 4.1 THOU/ML3 (ref 4.8–10.8)

## 2025-02-07 PROCEDURE — 93005 ELECTROCARDIOGRAM TRACING: CPT | Performed by: STUDENT IN AN ORGANIZED HEALTH CARE EDUCATION/TRAINING PROGRAM

## 2025-02-07 PROCEDURE — 83036 HEMOGLOBIN GLYCOSYLATED A1C: CPT | Performed by: STUDENT IN AN ORGANIZED HEALTH CARE EDUCATION/TRAINING PROGRAM

## 2025-02-07 PROCEDURE — 99212 OFFICE O/P EST SF 10 MIN: CPT | Performed by: STUDENT IN AN ORGANIZED HEALTH CARE EDUCATION/TRAINING PROGRAM

## 2025-02-07 RX ORDER — MOXIFLOXACIN 5 MG/ML
2 SOLUTION/ DROPS OPHTHALMIC 3 TIMES DAILY
COMMUNITY
Start: 2025-01-15

## 2025-02-07 RX ORDER — PREDNISOLONE ACETATE 10 MG/ML
2 SUSPENSION/ DROPS OPHTHALMIC 3 TIMES DAILY
COMMUNITY
Start: 2025-01-15

## 2025-02-07 SDOH — ECONOMIC STABILITY: FOOD INSECURITY: WITHIN THE PAST 12 MONTHS, YOU WORRIED THAT YOUR FOOD WOULD RUN OUT BEFORE YOU GOT MONEY TO BUY MORE.: NEVER TRUE

## 2025-02-07 SDOH — ECONOMIC STABILITY: FOOD INSECURITY: WITHIN THE PAST 12 MONTHS, THE FOOD YOU BOUGHT JUST DIDN'T LAST AND YOU DIDN'T HAVE MONEY TO GET MORE.: NEVER TRUE

## 2025-02-07 NOTE — PROGRESS NOTES
[address], [address]  [place] [address]  Phone: [phone]  Fax: [phone]      Date of Visit:  25  Patient Name: Mir Felix   Patient :  1945     ASSESSMENT/PLAN     1. Type 2 diabetes mellitus with complication (HCC)  -     POCT glycosylated hemoglobin (Hb A1C)  2. Heart murmur  -     Echo (TTE) complete (PRN contrast/bubble/strain/3D); Future  -     EKG 12 Lead  3. Palpitations  -     Echo (TTE) complete (PRN contrast/bubble/strain/3D); Future  -     External Referral To Cardiology  4. First degree heart block  -     Echo (TTE) complete (PRN contrast/bubble/strain/3D); Future  -     External Referral To Cardiology  5. PVC (premature ventricular contraction)  -     Echo (TTE) complete (PRN contrast/bubble/strain/3D); Future  -     External Referral To Cardiology  6. Bradycardia  7. Type 2 diabetes mellitus without complication, without long-term current use of insulin ([facility])  -     metFORMIN (GLUCOPHAGE) 500 MG tablet; Take 2 tablets by mouth 2 times daily (with meals), Disp-360 tablet, R-1NO PRINT     Patient with palpitations subjectively, does have PVCs on EKG today.  This is new symptom for him.  Will order CMP, magnesium, CBC, thyroid studies.  Will also order echocardiogram and referral to cardiology as above.  Patient does also have first-degree heart block on EKG.  May benefit from stress test and/or Holter, patient elects to discuss with cardiology before further ordering, therefore will place the order as above.  Patient to see Dr. Garcia at [facility].    A1c today 8.3.  Increasing metformin dosing.  Continue Jardiance.    - Questions/concerns answered. Patient verbalized and expressed understanding. Medications, laboratory testing, imaging, consultation, and follow up as documented in this encounter.       HPI:     Mir Felix is a 80 y.o. male with   Patient Active Problem List   Diagnosis    Gastroesophageal reflux

## 2025-02-26 DIAGNOSIS — K21.9 GASTROESOPHAGEAL REFLUX DISEASE WITHOUT ESOPHAGITIS: ICD-10-CM

## 2025-02-26 DIAGNOSIS — C61 MALIGNANT TUMOR OF PROSTATE (HCC): ICD-10-CM

## 2025-02-26 DIAGNOSIS — E78.5 HYPERLIPIDEMIA, UNSPECIFIED HYPERLIPIDEMIA TYPE: ICD-10-CM

## 2025-02-26 DIAGNOSIS — I10 ESSENTIAL HYPERTENSION: ICD-10-CM

## 2025-02-26 RX ORDER — ESOMEPRAZOLE MAGNESIUM 40 MG/1
CAPSULE, DELAYED RELEASE ORAL
Qty: 90 CAPSULE | Refills: 1 | Status: SHIPPED | OUTPATIENT
Start: 2025-02-26

## 2025-02-26 RX ORDER — DULOXETIN HYDROCHLORIDE 30 MG/1
30 CAPSULE, DELAYED RELEASE ORAL DAILY
Qty: 90 CAPSULE | Refills: 1 | Status: SHIPPED | OUTPATIENT
Start: 2025-02-26

## 2025-02-26 RX ORDER — LISINOPRIL 20 MG/1
TABLET ORAL
Qty: 90 TABLET | Refills: 1 | Status: SHIPPED | OUTPATIENT
Start: 2025-02-26

## 2025-02-26 RX ORDER — TAMSULOSIN HYDROCHLORIDE 0.4 MG/1
CAPSULE ORAL
Qty: 90 CAPSULE | Refills: 1 | Status: SHIPPED | OUTPATIENT
Start: 2025-02-26

## 2025-02-26 RX ORDER — SIMVASTATIN 40 MG
TABLET ORAL
Qty: 90 TABLET | Refills: 1 | Status: SHIPPED | OUTPATIENT
Start: 2025-02-26

## 2025-02-26 NOTE — TELEPHONE ENCOUNTER
Mir Felix is requesting a refill on the following medication(s):  Requested Prescriptions     Pending Prescriptions Disp Refills    lisinopril (PRINIVIL;ZESTRIL) 20 MG tablet [Pharmacy Med Name: Lisinopril Oral Tablet 20 MG] 90 tablet 3     Sig: TAKE 1 TABLET EVERY DAY    esomeprazole (NEXIUM) 40 MG delayed release capsule [Pharmacy Med Name: Esomeprazole Magnesium Oral Capsule Delayed Release 40 MG] 90 capsule 3     Sig: TAKE 1 CAPSULE EVERY MORNING BEFORE BREAKFAST    tamsulosin (FLOMAX) 0.4 MG capsule [Pharmacy Med Name: Tamsulosin HCl Oral Capsule 0.4 MG] 90 capsule 3     Sig: TAKE 1 CAPSULE EVERY DAY    DULoxetine (CYMBALTA) 30 MG extended release capsule [Pharmacy Med Name: DULoxetine HCl Oral Capsule Delayed Release Particles 30 MG] 90 capsule 3     Sig: TAKE 1 CAPSULE EVERY DAY    simvastatin (ZOCOR) 40 MG tablet [Pharmacy Med Name: Simvastatin Oral Tablet 40 MG] 90 tablet 3     Sig: TAKE 1 TABLET EVERY NIGHT       Last Visit Date (If Applicable):  2/7/2025    Next Visit Date:    5/8/2025

## 2025-03-13 DIAGNOSIS — E11.8 TYPE 2 DIABETES MELLITUS WITH COMPLICATION (HCC): ICD-10-CM

## 2025-03-13 RX ORDER — EMPAGLIFLOZIN 25 MG/1
25 TABLET, FILM COATED ORAL DAILY
Qty: 90 TABLET | Refills: 1 | Status: SHIPPED | OUTPATIENT
Start: 2025-03-13

## 2025-03-13 NOTE — TELEPHONE ENCOUNTER
Mir Felix is requesting a refill on the following medication(s):  Requested Prescriptions     Pending Prescriptions Disp Refills    JARDIANCE 25 MG tablet [Pharmacy Med Name: Jardiance Oral Tablet 25 MG] 90 tablet 3     Sig: TAKE 1 TABLET EVERY DAY       Last Visit Date (If Applicable):  2/7/2025    Next Visit Date:    5/8/2025

## 2025-04-26 NOTE — TELEPHONE ENCOUNTER
Bebeto Gaviria is calling to request a refill on the following medication(s):  Requested Prescriptions     Pending Prescriptions Disp Refills    esomeprazole (NEXIUM) 40 MG delayed release capsule 90 capsule 3     Sig: Take 1 capsule by mouth every morning (before breakfast)       Last Visit Date (If Applicable):  3/1/6778    Next Visit Date:    12/12/2017 Awake/Alert/Cooperative

## 2025-05-01 ENCOUNTER — OFFICE VISIT (OUTPATIENT)
Dept: FAMILY MEDICINE CLINIC | Age: 80
End: 2025-05-01
Payer: MEDICARE

## 2025-05-01 VITALS
WEIGHT: 198 LBS | DIASTOLIC BLOOD PRESSURE: 70 MMHG | BODY MASS INDEX: 30.71 KG/M2 | OXYGEN SATURATION: 98 % | SYSTOLIC BLOOD PRESSURE: 120 MMHG | HEART RATE: 70 BPM

## 2025-05-01 DIAGNOSIS — E11.9 TYPE 2 DIABETES MELLITUS WITHOUT COMPLICATION, WITHOUT LONG-TERM CURRENT USE OF INSULIN (HCC): ICD-10-CM

## 2025-05-01 DIAGNOSIS — R58 ECCHYMOSIS: ICD-10-CM

## 2025-05-01 DIAGNOSIS — M25.552 LEFT HIP PAIN: Primary | ICD-10-CM

## 2025-05-01 PROCEDURE — 1159F MED LIST DOCD IN RCRD: CPT | Performed by: STUDENT IN AN ORGANIZED HEALTH CARE EDUCATION/TRAINING PROGRAM

## 2025-05-01 PROCEDURE — 3078F DIAST BP <80 MM HG: CPT | Performed by: STUDENT IN AN ORGANIZED HEALTH CARE EDUCATION/TRAINING PROGRAM

## 2025-05-01 PROCEDURE — 1123F ACP DISCUSS/DSCN MKR DOCD: CPT | Performed by: STUDENT IN AN ORGANIZED HEALTH CARE EDUCATION/TRAINING PROGRAM

## 2025-05-01 PROCEDURE — 1036F TOBACCO NON-USER: CPT | Performed by: STUDENT IN AN ORGANIZED HEALTH CARE EDUCATION/TRAINING PROGRAM

## 2025-05-01 PROCEDURE — G8417 CALC BMI ABV UP PARAM F/U: HCPCS | Performed by: STUDENT IN AN ORGANIZED HEALTH CARE EDUCATION/TRAINING PROGRAM

## 2025-05-01 PROCEDURE — G8427 DOCREV CUR MEDS BY ELIG CLIN: HCPCS | Performed by: STUDENT IN AN ORGANIZED HEALTH CARE EDUCATION/TRAINING PROGRAM

## 2025-05-01 PROCEDURE — 99212 OFFICE O/P EST SF 10 MIN: CPT | Performed by: STUDENT IN AN ORGANIZED HEALTH CARE EDUCATION/TRAINING PROGRAM

## 2025-05-01 PROCEDURE — 3052F HG A1C>EQUAL 8.0%<EQUAL 9.0%: CPT | Performed by: STUDENT IN AN ORGANIZED HEALTH CARE EDUCATION/TRAINING PROGRAM

## 2025-05-01 PROCEDURE — 99214 OFFICE O/P EST MOD 30 MIN: CPT | Performed by: STUDENT IN AN ORGANIZED HEALTH CARE EDUCATION/TRAINING PROGRAM

## 2025-05-01 PROCEDURE — 3074F SYST BP LT 130 MM HG: CPT | Performed by: STUDENT IN AN ORGANIZED HEALTH CARE EDUCATION/TRAINING PROGRAM

## 2025-05-01 NOTE — PROGRESS NOTES
[address], [address]  [place] [address]  Phone: [phone]  Fax: [phone]      Date of Visit:  25  Patient Name: Mir Felix   Patient :  1945     ASSESSMENT/PLAN     1. Left hip pain  -     XR HIP 2-3 VW W PELVIS LEFT; Future  2. Ecchymosis  -     XR HIP 2-3 VW W PELVIS LEFT; Future  3. Type 2 diabetes mellitus without complication, without long-term current use of insulin (HCC)  -     metFORMIN (GLUCOPHAGE) 500 MG tablet; Take 2 tablets by mouth 2 times daily (with meals), Disp-360 tablet, R-1Normal     Ecchymosis of left hip, no injury. Is having pain in the area. Will order CT of the hip. No palpable abnormalities and ROM as expected.    Refill of metformin as above.    Follow up in 3 months with repeat A1c.    - Questions/concerns answered. Patient verbalized and expressed understanding. Medications, laboratory testing, imaging, consultation, and follow up as documented in this encounter.       HPI:     Mir Felix is a 80 y.o. male with   Patient Active Problem List   Diagnosis    Gastroesophageal reflux disease without esophagitis    Hyperlipidemia    Mild persistent asthma without complication    Rheumatoid arthritis (HCC)    Essential hypertension    Malignant tumor of prostate (HCC)    Type 2 diabetes mellitus with complication (HCC)    Chronic right shoulder pain    Exudative age-related macular degeneration of both eyes with active choroidal neovascularization (HCC)    Adhesive capsulitis of shoulder     who presents today to discuss   Chief Complaint   Patient presents with    Diabetes     3 month f/u    Hip Pain     Pt states that the last 3 weeks he has been having left hip pain. Pt denies any injury for the pain. Pt states that he only has pain when he moves certain ways.        HPI: Patient presents for routine follow up. Has been having left hip pain over the last 3 weeks. No injury. Pain with flexion

## 2025-05-05 ENCOUNTER — RESULTS FOLLOW-UP (OUTPATIENT)
Dept: FAMILY MEDICINE CLINIC | Age: 80
End: 2025-05-05

## 2025-05-08 ENCOUNTER — HOSPITAL ENCOUNTER (OUTPATIENT)
Age: 80
Setting detail: OUTPATIENT SURGERY
Discharge: HOME | End: 2025-05-08
Attending: INTERNAL MEDICINE | Admitting: INTERNAL MEDICINE
Payer: MEDICARE

## 2025-05-08 VITALS
HEART RATE: 58 BPM | TEMPERATURE: 98.1 F | WEIGHT: 192 LBS | HEIGHT: 67 IN | BODY MASS INDEX: 30.13 KG/M2 | SYSTOLIC BLOOD PRESSURE: 144 MMHG | RESPIRATION RATE: 15 BRPM | OXYGEN SATURATION: 98 % | DIASTOLIC BLOOD PRESSURE: 48 MMHG

## 2025-05-08 DIAGNOSIS — R94.39 ABNORMAL STRESS TEST: ICD-10-CM

## 2025-05-08 LAB
BUN BLD-MCNC: 16 MG/DL (ref 8–26)
CHLORIDE BLD-SCNC: 112 MMOL/L (ref 98–107)
ECHO BSA: 2.03 M2
EGFR, POC: 67 ML/MIN/1.73M2
GLUCOSE BLD-MCNC: 194 MG/DL (ref 74–100)
HCT VFR BLD AUTO: 49 % (ref 41–53)
PLATELET # BLD AUTO: 167 K/UL (ref 138–453)
POC CREATININE: 1.1 MG/DL (ref 0.51–1.19)
POC HEMOGLOBIN (CALC): 16.5 G/DL (ref 13.5–17.5)
POTASSIUM BLD-SCNC: 4.1 MMOL/L (ref 3.5–4.5)
SODIUM BLD-SCNC: 143 MMOL/L (ref 138–146)

## 2025-05-08 PROCEDURE — 2500000003 HC RX 250 WO HCPCS: Performed by: INTERNAL MEDICINE

## 2025-05-08 PROCEDURE — 7100000011 HC PHASE II RECOVERY - ADDTL 15 MIN: Performed by: INTERNAL MEDICINE

## 2025-05-08 PROCEDURE — 85014 HEMATOCRIT: CPT

## 2025-05-08 PROCEDURE — C1769 GUIDE WIRE: HCPCS | Performed by: INTERNAL MEDICINE

## 2025-05-08 PROCEDURE — C1894 INTRO/SHEATH, NON-LASER: HCPCS | Performed by: INTERNAL MEDICINE

## 2025-05-08 PROCEDURE — 84295 ASSAY OF SERUM SODIUM: CPT

## 2025-05-08 PROCEDURE — 6360000004 HC RX CONTRAST MEDICATION: Performed by: INTERNAL MEDICINE

## 2025-05-08 PROCEDURE — 93458 L HRT ARTERY/VENTRICLE ANGIO: CPT | Performed by: INTERNAL MEDICINE

## 2025-05-08 PROCEDURE — 84520 ASSAY OF UREA NITROGEN: CPT

## 2025-05-08 PROCEDURE — 82565 ASSAY OF CREATININE: CPT

## 2025-05-08 PROCEDURE — 85049 AUTOMATED PLATELET COUNT: CPT

## 2025-05-08 PROCEDURE — 82947 ASSAY GLUCOSE BLOOD QUANT: CPT

## 2025-05-08 PROCEDURE — 99152 MOD SED SAME PHYS/QHP 5/>YRS: CPT | Performed by: INTERNAL MEDICINE

## 2025-05-08 PROCEDURE — 7100000010 HC PHASE II RECOVERY - FIRST 15 MIN: Performed by: INTERNAL MEDICINE

## 2025-05-08 PROCEDURE — 84132 ASSAY OF SERUM POTASSIUM: CPT

## 2025-05-08 PROCEDURE — 99151 MOD SED SAME PHYS/QHP <5 YRS: CPT | Performed by: INTERNAL MEDICINE

## 2025-05-08 PROCEDURE — 2580000003 HC RX 258: Performed by: INTERNAL MEDICINE

## 2025-05-08 PROCEDURE — 82435 ASSAY OF BLOOD CHLORIDE: CPT

## 2025-05-08 PROCEDURE — 99222 1ST HOSP IP/OBS MODERATE 55: CPT | Performed by: INTERNAL MEDICINE

## 2025-05-08 PROCEDURE — 2709999900 HC NON-CHARGEABLE SUPPLY: Performed by: INTERNAL MEDICINE

## 2025-05-08 PROCEDURE — 6360000002 HC RX W HCPCS: Performed by: INTERNAL MEDICINE

## 2025-05-08 RX ORDER — ASPIRIN 81 MG/1
81 TABLET ORAL DAILY
Qty: 90 TABLET | Refills: 3 | Status: SHIPPED | OUTPATIENT
Start: 2025-05-08 | End: 2026-05-03

## 2025-05-08 RX ORDER — ACETAMINOPHEN 325 MG/1
650 TABLET ORAL EVERY 4 HOURS PRN
Status: DISCONTINUED | OUTPATIENT
Start: 2025-05-08 | End: 2025-05-08 | Stop reason: HOSPADM

## 2025-05-08 RX ORDER — FENTANYL CITRATE 50 UG/ML
INJECTION, SOLUTION INTRAMUSCULAR; INTRAVENOUS PRN
Status: DISCONTINUED | OUTPATIENT
Start: 2025-05-08 | End: 2025-05-08 | Stop reason: HOSPADM

## 2025-05-08 RX ORDER — MIDAZOLAM HYDROCHLORIDE 1 MG/ML
INJECTION, SOLUTION INTRAMUSCULAR; INTRAVENOUS PRN
Status: DISCONTINUED | OUTPATIENT
Start: 2025-05-08 | End: 2025-05-08 | Stop reason: HOSPADM

## 2025-05-08 RX ORDER — SODIUM CHLORIDE 9 MG/ML
INJECTION, SOLUTION INTRAVENOUS PRN
Status: DISCONTINUED | OUTPATIENT
Start: 2025-05-08 | End: 2025-05-08 | Stop reason: HOSPADM

## 2025-05-08 RX ORDER — SODIUM CHLORIDE 9 MG/ML
INJECTION, SOLUTION INTRAVENOUS CONTINUOUS
Status: DISCONTINUED | OUTPATIENT
Start: 2025-05-08 | End: 2025-05-08 | Stop reason: HOSPADM

## 2025-05-08 RX ORDER — IOPAMIDOL 755 MG/ML
INJECTION, SOLUTION INTRAVASCULAR PRN
Status: DISCONTINUED | OUTPATIENT
Start: 2025-05-08 | End: 2025-05-08 | Stop reason: HOSPADM

## 2025-05-08 RX ORDER — SODIUM CHLORIDE 0.9 % (FLUSH) 0.9 %
5-40 SYRINGE (ML) INJECTION PRN
Status: DISCONTINUED | OUTPATIENT
Start: 2025-05-08 | End: 2025-05-08 | Stop reason: HOSPADM

## 2025-05-08 RX ORDER — SODIUM CHLORIDE 0.9 % (FLUSH) 0.9 %
5-40 SYRINGE (ML) INJECTION EVERY 12 HOURS SCHEDULED
Status: DISCONTINUED | OUTPATIENT
Start: 2025-05-08 | End: 2025-05-08 | Stop reason: HOSPADM

## 2025-05-08 RX ADMIN — SODIUM CHLORIDE: 9 INJECTION, SOLUTION INTRAVENOUS at 11:30

## 2025-05-08 NOTE — H&P
[facility]    Consult / H&P               Today's Date: 5/8/2025  Patient Name: Mir Felix  Date of admission: No admission date for patient encounter.  Patient's age: 80 y.o., 1945  Admission Dx: Abnormal stress test [R94.39]    Requesting Physician: Maximnio Garcia MD    Cardiac Evaluation Reason:  [facility] for SOB, STORM, positive Lexiscan with inferoapical infarct and perinfarct ischemia, and frequent PVCs    History Obtained From: patient and chart review     History of Present Illness:    This patient 80 y.o. years old with past medical history given below. Presented to OP cardiologist with SOB, fatigue, STORM and frquent PVCs on Holter monitor. Lexiscan showed inferoapical infarct with perininfarct ischemia    Past Medical History:   has a past medical history of Chronic midline low back pain with right-sided sciatica, GERD (gastroesophageal reflux disease), Hyperlipidemia, Hypertension, Kidney stone, Melanoma (HCC), Nuclear sclerotic cataract of right eye, Panniculitis affecting regions of neck and back, lumbar region, Prostate cancer (HCC), Prostate cancer (HCC), Raised prostate specific antigen, Rheumatoid arthritis (HCC), Right corneal abrasion, Sepsis (HCC), Status post laser cataract surgery, left, Stroke (HCC), and Type 2 diabetes mellitus (HCC).    Past Surgical History:   has a past surgical history that includes Cholecystectomy; Colonoscopy (2018); Cardiac catheterization; Nerve Surgery (Left, 7/26/2019); Nerve Surgery (Left, 8/13/2019); and Prostate biopsy (2018).     Home Medications:    Prior to Admission medications    Medication Sig Start Date End Date Taking? Authorizing Provider   metFORMIN (GLUCOPHAGE) 500 MG tablet Take 2 tablets by mouth 2 times daily (with meals) 5/1/25   Deuce Rees MD   empagliflozin (JARDIANCE) 25 MG tablet TAKE 1 TABLET EVERY DAY 3/13/25   Deuce Rees MD   lisinopril (PRINIVIL;ZESTRIL) 20 MG tablet TAKE 1 TABLET EVERY DAY 2/26/25

## 2025-05-08 NOTE — PROGRESS NOTES
All discharge instructions reviewed, questions answered. Pulses obtained & documented. Site clean dry and intact. No bleeding, hematoma, or bruising noted.

## 2025-05-08 NOTE — DISCHARGE INSTRUCTIONS
DISCHARGE INSTRUCTIONS    Home Care :  Ok to remove band-aid in 24 hours then then shower.  Do not apply further band aids. Keep clean, dry and open to air.  Avoid power, lotion, hand  on or around site to prevent infection.  Do not soak in a pool, Hot Tub, or bath tub and for one week after the test to prevent infection.   If there is any bleeding at the site, apply firm pressure with your Fingers/hands until the bleeding stops.   If bleeding continues after 3 minutes or if there is any swelling/firm areas (Hematoma) at your puncture site, Call 911.   Drink plenty of fluids after the test. This will flush the x-ray dye from your system.   Return to your normal diet.    The sedative will make you sleepy. Rest until the effects have worn off.   Ask your doctor when you will be able to return to work.   Avoid lifting objects heavier than 8- 10 pounds (gallon of Milk) for 3-5 days.  Avoid Physically demanding activities, and sexual activity for 3-5 days.   Try to change positions frequently to prevent blood clots.    Medications:  Resume normal medicines. Use acetaminophen(Tylenol) for pain relief.  DO NOT STOP TAKING BLOOD THINNERS UNLESS TOLD TO BY YOUR CARDIOLOGIST  Do not take metformin (Glucophage) or glyburide and metformin (Glucovance) for 48 hours after the test.     Call Your Doctor's office If Any of the Following Occurs :  Signs of infection, including fever and chills ,Redness, swelling, increasing pain, excessive bleeding, or any discharge from the insertion site     CALL 911 if Any of the Following Occurs:  Drooping facial muscles, Changes in vision or speech   Change in sensation to affected leg, including numbness and/or tingling.   Discoloration of nail beds and/or coolness of affected limp.  Extreme sweating, nausea or vomiting   Dizziness or lightheadedness. Weakness or fainting  Rapid, irregular heartbeat, Palpitations, Chest Pain.   shortness of breath, or difficulty

## 2025-05-08 NOTE — PROGRESS NOTES
Patient returned to PCC room post procedure.  Assessment & VS obtained. Restrictions/Education reviewed with patient. Post procedure pathway initiated. Pt without complications.  Right radial site with 8 mls of air in vasc band. Air to be removed per protocol.  No hematoma noted. RN at bedside. Pulses obtained and documented. Patient eating and drinking w/o issue. Will continue to monitor.

## 2025-05-08 NOTE — PROGRESS NOTES
Discussed that now is not the best time for her to stop her Celexa and that Xanax is more likely to make her foggy than Celexa. Also discussed that Celexa will actually help her think more clearly and make her less mean. She is agreeable and will resume Celexa 20 mg po qd. Discussed that Xanax is to be used when she is absolutely on edge and cannot calm down. Has a very strong trina and finds a lot of comfort with that as well. Has support from her Judaism. Pt ambulatory vasc band removed and w/o issues. IV removed.

## 2025-05-08 NOTE — PROGRESS NOTES
Patient admitted, questions answered.  Call light to reach with side rails up 2 of 2. Bilateral Groin clipped with writer and Carolina Mahmood present.  RN at bedside with patient.  History and physical to be updated. VS & Pulses obtained and documented. Aspirin not given b/c of the contraindication. Cath lab staff, Yamileth, aware. Will continue to monitor.

## 2025-05-08 NOTE — PROCEDURES
[facility]        Date:   5/8/2025  Patient name: Mir Felix  Date of admission:  5/8/2025 11:02 AM  MRN:   [id number]  YOB: 1945    CARDIAC CATHETERIZATION    Operators:  Primary: Maximino Garcia MD.  Assistant:     Indications for cath: USA, Abnormal nuclear stress test, frequent PVCs    Procedure performed: Cardiac cath.    Access: Right Radial artery      Procedure: After informed consent was obtained with explanation of the risks and benefits, patient was brought to the cath lab. The right wrist was prepped and draped in sterile fashion. 1% lidocaine was used for local block. The Radial artery was cannulated with 6  Fr sheath with brisk arterial blood return. The side port was frequently flushed and aspirated with normal saline.    EBL is 5 mL    Dominance is right    Findings:    Left main: 70% ostial stenosis with pressure dampening    LAD: 80% proximal and 90% mid stenosis  1st diagonal: 80% ostial stenosis  2nd diagonal: 90% ostial stenosis    LCX: Small vessel with luminal irregularities of 20 to 30%    RCA: Luminal irregularities of 30 to 40%  RPDA: 90% mid stenosis  1st RPL: 80% proximal stenosis  2nd RPL: 80% proximal stenosis    The LV gram was performed in the ACEVEDO 30 position.   LVEF: 60%. LV Wall Motion: Normal    Conclusions:  Left main and multivessel CAD  Overall preserved LV function    Recommendation:  CTS consult for CABG  Medical treatments.  Risk factors modifications.        Electronically signed by Maximino Garcia MD on 5/8/2025 at 12:46 PM      [facility]  [phone]

## 2025-05-24 NOTE — TELEPHONE ENCOUNTER
From: Erick Corea  Sent: 12/19/2017 7:21 PM EST  Subject: Medication Renewal Request    Erick Corea would like a refill of the following medications:   SITagliptin (JANUVIA) 100 MG tablet Vicente Casillsa MD]    Preferred pharmacy: [address] [place] [address] , [address] [facility] [phone] [facility] [phone]    Comment:
No

## 2025-05-25 DIAGNOSIS — E11.8 TYPE 2 DIABETES MELLITUS WITH COMPLICATION (HCC): ICD-10-CM

## 2025-05-27 DIAGNOSIS — E11.8 TYPE 2 DIABETES MELLITUS WITH COMPLICATION (HCC): ICD-10-CM

## 2025-05-27 RX ORDER — EMPAGLIFLOZIN 25 MG/1
25 TABLET, FILM COATED ORAL DAILY
Qty: 90 TABLET | Refills: 1 | Status: SHIPPED | OUTPATIENT
Start: 2025-05-27

## 2025-05-27 NOTE — TELEPHONE ENCOUNTER
Mir Felix is requesting a refill on the following medication(s):  Requested Prescriptions     Pending Prescriptions Disp Refills    JARDIANCE 25 MG tablet [Pharmacy Med Name: Jardiance Oral Tablet 25 MG] 90 tablet 3     Sig: TAKE 1 TABLET EVERY DAY       Last Visit Date (If Applicable):  5/1/2025    Next Visit Date:    5/27/2025

## 2025-05-27 NOTE — TELEPHONE ENCOUNTER
Mir Felix is requesting a refill on the following medication(s):  Requested Prescriptions     Pending Prescriptions Disp Refills    empagliflozin (JARDIANCE) 25 MG tablet 90 tablet 1     Sig: Take 1 tablet by mouth daily       Last Visit Date (If Applicable):  5/1/2025    Next Visit Date:    Visit date not found

## 2025-07-01 ENCOUNTER — OFFICE VISIT (OUTPATIENT)
Dept: FAMILY MEDICINE CLINIC | Age: 80
End: 2025-07-01
Payer: MEDICARE

## 2025-07-01 VITALS
SYSTOLIC BLOOD PRESSURE: 130 MMHG | BODY MASS INDEX: 29.37 KG/M2 | WEIGHT: 193.8 LBS | OXYGEN SATURATION: 96 % | DIASTOLIC BLOOD PRESSURE: 82 MMHG | HEIGHT: 68 IN | HEART RATE: 86 BPM

## 2025-07-01 DIAGNOSIS — I12.9 HYPERTENSIVE KIDNEY DISEASE WITH STAGE 3A CHRONIC KIDNEY DISEASE (HCC): ICD-10-CM

## 2025-07-01 DIAGNOSIS — N18.31 HYPERTENSIVE KIDNEY DISEASE WITH STAGE 3A CHRONIC KIDNEY DISEASE (HCC): ICD-10-CM

## 2025-07-01 DIAGNOSIS — E11.8 TYPE 2 DIABETES MELLITUS WITH COMPLICATION (HCC): Primary | ICD-10-CM

## 2025-07-01 DIAGNOSIS — Z00.00 MEDICARE ANNUAL WELLNESS VISIT, SUBSEQUENT: ICD-10-CM

## 2025-07-01 DIAGNOSIS — M06.9 RHEUMATOID ARTHRITIS, INVOLVING UNSPECIFIED SITE, UNSPECIFIED WHETHER RHEUMATOID FACTOR PRESENT (HCC): ICD-10-CM

## 2025-07-01 DIAGNOSIS — C61 MALIGNANT TUMOR OF PROSTATE (HCC): ICD-10-CM

## 2025-07-01 DIAGNOSIS — H35.3231 EXUDATIVE AGE-RELATED MACULAR DEGENERATION OF BOTH EYES WITH ACTIVE CHOROIDAL NEOVASCULARIZATION (HCC): ICD-10-CM

## 2025-07-01 DIAGNOSIS — Z12.5 PROSTATE CANCER SCREENING: ICD-10-CM

## 2025-07-01 LAB
CHOLESTEROL, TOTAL: 135 MG/DL (ref 50–200)
CHOLESTEROL/HDL RATIO: 2 RATIO (ref 0–4.5)
DIAGNOSTIC PSA: < 0.06 NG/ML (ref 0–4)
HBA1C MFR BLD: 7.4 %
HDLC SERPL-MCNC: 55 MG/DL (ref 36–68)
LDL CHOLESTEROL: 46.8 MG/DL (ref 0–160)
TRIGL SERPL-MCNC: 166 MG/DL (ref 10–250)
VLDLC SERPL CALC-MCNC: 33 MG/DL (ref 0–50)

## 2025-07-01 PROCEDURE — 83036 HEMOGLOBIN GLYCOSYLATED A1C: CPT | Performed by: STUDENT IN AN ORGANIZED HEALTH CARE EDUCATION/TRAINING PROGRAM

## 2025-07-01 PROCEDURE — G0439 PPPS, SUBSEQ VISIT: HCPCS | Performed by: STUDENT IN AN ORGANIZED HEALTH CARE EDUCATION/TRAINING PROGRAM

## 2025-07-01 PROCEDURE — 1123F ACP DISCUSS/DSCN MKR DOCD: CPT | Performed by: STUDENT IN AN ORGANIZED HEALTH CARE EDUCATION/TRAINING PROGRAM

## 2025-07-01 PROCEDURE — 3079F DIAST BP 80-89 MM HG: CPT | Performed by: STUDENT IN AN ORGANIZED HEALTH CARE EDUCATION/TRAINING PROGRAM

## 2025-07-01 PROCEDURE — 3075F SYST BP GE 130 - 139MM HG: CPT | Performed by: STUDENT IN AN ORGANIZED HEALTH CARE EDUCATION/TRAINING PROGRAM

## 2025-07-01 PROCEDURE — 3051F HG A1C>EQUAL 7.0%<8.0%: CPT | Performed by: STUDENT IN AN ORGANIZED HEALTH CARE EDUCATION/TRAINING PROGRAM

## 2025-07-01 PROCEDURE — PBSHW POCT GLYCOSYLATED HEMOGLOBIN (HGB A1C): Performed by: STUDENT IN AN ORGANIZED HEALTH CARE EDUCATION/TRAINING PROGRAM

## 2025-07-01 RX ORDER — ROSUVASTATIN CALCIUM 10 MG/1
10 TABLET, COATED ORAL DAILY
COMMUNITY
Start: 2025-05-30

## 2025-07-01 RX ORDER — EZETIMIBE 10 MG/1
10 TABLET ORAL DAILY
COMMUNITY
Start: 2025-05-30

## 2025-07-01 ASSESSMENT — PATIENT HEALTH QUESTIONNAIRE - PHQ9
SUM OF ALL RESPONSES TO PHQ QUESTIONS 1-9: 0
1. LITTLE INTEREST OR PLEASURE IN DOING THINGS: NOT AT ALL
SUM OF ALL RESPONSES TO PHQ QUESTIONS 1-9: 0
2. FEELING DOWN, DEPRESSED OR HOPELESS: NOT AT ALL

## 2025-07-01 NOTE — PROGRESS NOTES
daily activities because of your eyesight?: (!) Yes  Have you had an eye exam within the past year?: Yes  Interventions:   See AVS for additional education material    Safety:  Do you have non-slip mats or non-slip surfaces or shower bars or grab bars in your shower or bathtub?: (!) No  Interventions:  See AVS for additional education material                   Objective   Vitals:    07/01/25 1416   BP: 130/82   BP Site: Right Upper Arm   Patient Position: Sitting   BP Cuff Size: Medium Adult   Pulse: 86   SpO2: 96%   Weight: 87.9 kg (193 lb 12.8 oz)   Height: 1.715 m (5' 7.5\")      Body mass index is 29.91 kg/m².                    Allergies   Allergen Reactions    Crestor [Rosuvastatin] Nausea Only     GI Upset    Meloxicam Other (See Comments)     Passed out     Prior to Visit Medications    Medication Sig Taking? Authorizing Provider   ezetimibe (ZETIA) 10 MG tablet Take 1 tablet by mouth daily Yes ProviderMariajose MD   rosuvastatin (CRESTOR) 10 MG tablet Take 1 tablet by mouth daily Yes ProviderMariajose MD   empagliflozin (JARDIANCE) 25 MG tablet TAKE 1 TABLET EVERY DAY Yes Deuce Rees MD   aspirin 81 MG EC tablet Take 1 tablet by mouth daily Yes Peter Capps MD   metFORMIN (GLUCOPHAGE) 500 MG tablet Take 2 tablets by mouth 2 times daily (with meals) Yes Deuce Rees MD   lisinopril (PRINIVIL;ZESTRIL) 20 MG tablet TAKE 1 TABLET EVERY DAY Yes Deuce Rees MD   esomeprazole (NEXIUM) 40 MG delayed release capsule TAKE 1 CAPSULE EVERY MORNING BEFORE BREAKFAST Yes Deuce Rees MD   tamsulosin (FLOMAX) 0.4 MG capsule TAKE 1 CAPSULE EVERY DAY Yes Deuce Rees MD   DULoxetine (CYMBALTA) 30 MG extended release capsule TAKE 1 CAPSULE EVERY DAY Yes Deuce Rees MD   moxifloxacin (VIGAMOX) 0.5 % ophthalmic solution Place 2 drops into the right eye 3 times daily Yes ProviderMariajose MD   prednisoLONE acetate (PRED FORTE) 1 % ophthalmic suspension Place 2

## 2025-07-01 NOTE — PATIENT INSTRUCTIONS
Medicare Service Recommended Frequency Last Done Due next   Pneumonia vaccine After 65, Prevnar 20 ONCE  If has had both Prevnar 13 and Pneumovax 23 - Need Prevnar 20 five years after most recent pneumococcal vaccination  If has had only Prevnar 13 OR Pneumovax 23 - Need Prevnar 20 one year after most recent pneumococcal vaccination Last in 2025 NA   Influenza vaccine Yearly 2024 2025   Zoster/Shingles Shingrix vaccine:  2 shots 2-6 months apart; recommended even if has had Zostavax 2020 - completed series NA   COVID Variable 2024 Per CDC recommendations    Tetanus vaccine (Td or Tdap) Every 10 years  2017 2027   RSV vaccine One injection 2023 NA - completed series   Colorectal Cancer Screening FIT test yearly, Cologuard every 3 years, Colonoscopy every 10 years *unless otherwise indicated*;     Every 5 years with personal family history in first degree relative - need to start screening at age 40 or 5 years before age family member was diagnosed at 2018 - not interested in further screening Let us know if you change your mind   Prostate Cancer Shared decision making with patient depending on family history and risk; recommended yearly if has done Last negative in 2024 Ordered today, hx of prostate   Cardiovascular/cholesterol screening As determined by your physician 2024 2025   Diabetes screening   As determined by your physician Every 3 months Today   Osteoporosis screening   DEXA every 2 years for females (ages 65-85) OR males on long term corticosteroid therapy or with history of fragility fractures NA NA   Screening for abdominal aortic aneurysm One-time screening in patients if you have a family history of abdominal aortic aneurysms, or you're a man 65-75 and have smoked at least 100 cigarettes in your lifetime NA NA   Low Dose CT/Lung Cancer  Annual ages 50 to 77 (80) years who have a 20 pack-year smoking history and currently smoke or have quit within the past 15 years NA NA   Glaucoma screening

## 2025-07-03 ENCOUNTER — HOSPITAL ENCOUNTER (OUTPATIENT)
Age: 80
Setting detail: OUTPATIENT SURGERY
Discharge: HOME | End: 2025-07-03
Attending: INTERNAL MEDICINE | Admitting: INTERNAL MEDICINE
Payer: MEDICARE

## 2025-07-03 VITALS
BODY MASS INDEX: 29.98 KG/M2 | HEART RATE: 53 BPM | SYSTOLIC BLOOD PRESSURE: 155 MMHG | DIASTOLIC BLOOD PRESSURE: 51 MMHG | RESPIRATION RATE: 14 BRPM | TEMPERATURE: 97.8 F | OXYGEN SATURATION: 97 % | HEIGHT: 67 IN | WEIGHT: 191 LBS

## 2025-07-03 DIAGNOSIS — I25.119 CORONARY ARTERY DISEASE WITH ANGINA PECTORIS, UNSPECIFIED VESSEL OR LESION TYPE, UNSPECIFIED WHETHER NATIVE OR TRANSPLANTED HEART: ICD-10-CM

## 2025-07-03 LAB
BUN BLD-MCNC: 14 MG/DL (ref 8–26)
ECHO BSA: 2.02 M2
EGFR, POC: 50 ML/MIN/1.73M2
GLUCOSE BLD-MCNC: 166 MG/DL (ref 74–100)
HCT VFR BLD AUTO: 45 % (ref 41–53)
PLATELET # BLD AUTO: 145 K/UL (ref 138–453)
POC CREATININE: 1.4 MG/DL (ref 0.51–1.19)
POC HEMOGLOBIN (CALC): 15.4 G/DL (ref 13.5–17.5)
POTASSIUM BLD-SCNC: 4.6 MMOL/L (ref 3.5–4.5)
SODIUM BLD-SCNC: 148 MMOL/L (ref 138–146)

## 2025-07-03 PROCEDURE — 2580000003 HC RX 258: Performed by: INTERNAL MEDICINE

## 2025-07-03 PROCEDURE — 84295 ASSAY OF SERUM SODIUM: CPT

## 2025-07-03 PROCEDURE — 7100000010 HC PHASE II RECOVERY - FIRST 15 MIN: Performed by: INTERNAL MEDICINE

## 2025-07-03 PROCEDURE — 7100000011 HC PHASE II RECOVERY - ADDTL 15 MIN: Performed by: INTERNAL MEDICINE

## 2025-07-03 PROCEDURE — C9601 PERC DRUG-EL COR STENT BRAN: HCPCS | Performed by: INTERNAL MEDICINE

## 2025-07-03 PROCEDURE — 6360000004 HC RX CONTRAST MEDICATION: Performed by: INTERNAL MEDICINE

## 2025-07-03 PROCEDURE — 82947 ASSAY GLUCOSE BLOOD QUANT: CPT

## 2025-07-03 PROCEDURE — 99153 MOD SED SAME PHYS/QHP EA: CPT | Performed by: INTERNAL MEDICINE

## 2025-07-03 PROCEDURE — 6360000002 HC RX W HCPCS: Performed by: INTERNAL MEDICINE

## 2025-07-03 PROCEDURE — C1887 CATHETER, GUIDING: HCPCS | Performed by: INTERNAL MEDICINE

## 2025-07-03 PROCEDURE — 99152 MOD SED SAME PHYS/QHP 5/>YRS: CPT | Performed by: INTERNAL MEDICINE

## 2025-07-03 PROCEDURE — 92928 PRQ TCAT PLMT NTRAC ST 1 LES: CPT | Performed by: INTERNAL MEDICINE

## 2025-07-03 PROCEDURE — 99222 1ST HOSP IP/OBS MODERATE 55: CPT | Performed by: INTERNAL MEDICINE

## 2025-07-03 PROCEDURE — C9600 PERC DRUG-EL COR STENT SING: HCPCS | Performed by: INTERNAL MEDICINE

## 2025-07-03 PROCEDURE — 6370000000 HC RX 637 (ALT 250 FOR IP): Performed by: INTERNAL MEDICINE

## 2025-07-03 PROCEDURE — C1894 INTRO/SHEATH, NON-LASER: HCPCS | Performed by: INTERNAL MEDICINE

## 2025-07-03 PROCEDURE — C1874 STENT, COATED/COV W/DEL SYS: HCPCS | Performed by: INTERNAL MEDICINE

## 2025-07-03 PROCEDURE — 84132 ASSAY OF SERUM POTASSIUM: CPT

## 2025-07-03 PROCEDURE — C1725 CATH, TRANSLUMIN NON-LASER: HCPCS | Performed by: INTERNAL MEDICINE

## 2025-07-03 PROCEDURE — 84520 ASSAY OF UREA NITROGEN: CPT

## 2025-07-03 PROCEDURE — 2709999900 HC NON-CHARGEABLE SUPPLY: Performed by: INTERNAL MEDICINE

## 2025-07-03 PROCEDURE — 93458 L HRT ARTERY/VENTRICLE ANGIO: CPT | Performed by: INTERNAL MEDICINE

## 2025-07-03 PROCEDURE — 82565 ASSAY OF CREATININE: CPT

## 2025-07-03 PROCEDURE — C1769 GUIDE WIRE: HCPCS | Performed by: INTERNAL MEDICINE

## 2025-07-03 PROCEDURE — 85049 AUTOMATED PLATELET COUNT: CPT

## 2025-07-03 PROCEDURE — 85014 HEMATOCRIT: CPT

## 2025-07-03 PROCEDURE — 93463 DRUG ADMIN & HEMODYNMIC MEAS: CPT | Performed by: INTERNAL MEDICINE

## 2025-07-03 DEVICE — STENT ONYXNG22538UX ONYX 2.25X38RX
Type: IMPLANTABLE DEVICE | Status: FUNCTIONAL
Brand: ONYX FRONTIER™

## 2025-07-03 DEVICE — STENT ONYXNG25026UX ONYX 2.50X26RX
Type: IMPLANTABLE DEVICE | Status: FUNCTIONAL
Brand: ONYX FRONTIER™

## 2025-07-03 RX ORDER — FENTANYL CITRATE 50 UG/ML
INJECTION, SOLUTION INTRAMUSCULAR; INTRAVENOUS PRN
Status: DISCONTINUED | OUTPATIENT
Start: 2025-07-03 | End: 2025-07-03 | Stop reason: HOSPADM

## 2025-07-03 RX ORDER — HYDRALAZINE HYDROCHLORIDE 20 MG/ML
INJECTION INTRAMUSCULAR; INTRAVENOUS PRN
Status: DISCONTINUED | OUTPATIENT
Start: 2025-07-03 | End: 2025-07-03 | Stop reason: HOSPADM

## 2025-07-03 RX ORDER — SODIUM CHLORIDE 0.9 % (FLUSH) 0.9 %
5-40 SYRINGE (ML) INJECTION PRN
Status: CANCELLED | OUTPATIENT
Start: 2025-07-03

## 2025-07-03 RX ORDER — TICAGRELOR 90 MG/1
90 TABLET, FILM COATED ORAL 2 TIMES DAILY
Qty: 180 TABLET | Refills: 3 | Status: SHIPPED | OUTPATIENT
Start: 2025-07-03 | End: 2026-06-28

## 2025-07-03 RX ORDER — LABETALOL HYDROCHLORIDE 5 MG/ML
INJECTION, SOLUTION INTRAVENOUS PRN
Status: DISCONTINUED | OUTPATIENT
Start: 2025-07-03 | End: 2025-07-03 | Stop reason: HOSPADM

## 2025-07-03 RX ORDER — BIVALIRUDIN 250 MG/5ML
INJECTION, POWDER, LYOPHILIZED, FOR SOLUTION INTRAVENOUS PRN
Status: DISCONTINUED | OUTPATIENT
Start: 2025-07-03 | End: 2025-07-03 | Stop reason: HOSPADM

## 2025-07-03 RX ORDER — TICAGRELOR 90 MG/1
90 TABLET, FILM COATED ORAL 2 TIMES DAILY
Qty: 180 TABLET | Refills: 1 | Status: SHIPPED | OUTPATIENT
Start: 2025-07-03 | End: 2025-12-30

## 2025-07-03 RX ORDER — SODIUM CHLORIDE 0.9 % (FLUSH) 0.9 %
5-40 SYRINGE (ML) INJECTION EVERY 12 HOURS SCHEDULED
Status: CANCELLED | OUTPATIENT
Start: 2025-07-03

## 2025-07-03 RX ORDER — TICAGRELOR 90 MG/1
TABLET, FILM COATED ORAL PRN
Status: DISCONTINUED | OUTPATIENT
Start: 2025-07-03 | End: 2025-07-03 | Stop reason: HOSPADM

## 2025-07-03 RX ORDER — IOPAMIDOL 755 MG/ML
INJECTION, SOLUTION INTRAVASCULAR PRN
Status: DISCONTINUED | OUTPATIENT
Start: 2025-07-03 | End: 2025-07-03 | Stop reason: HOSPADM

## 2025-07-03 RX ORDER — HEPARIN SODIUM 1000 [USP'U]/ML
INJECTION, SOLUTION INTRAVENOUS; SUBCUTANEOUS PRN
Status: DISCONTINUED | OUTPATIENT
Start: 2025-07-03 | End: 2025-07-03 | Stop reason: HOSPADM

## 2025-07-03 RX ORDER — NITROGLYCERIN 20 MG/100ML
INJECTION INTRAVENOUS PRN
Status: DISCONTINUED | OUTPATIENT
Start: 2025-07-03 | End: 2025-07-03 | Stop reason: HOSPADM

## 2025-07-03 RX ORDER — TICAGRELOR 90 MG/1
90 TABLET, FILM COATED ORAL 2 TIMES DAILY
Status: CANCELLED | OUTPATIENT
Start: 2025-07-03

## 2025-07-03 RX ORDER — MIDAZOLAM HYDROCHLORIDE 1 MG/ML
INJECTION, SOLUTION INTRAMUSCULAR; INTRAVENOUS PRN
Status: DISCONTINUED | OUTPATIENT
Start: 2025-07-03 | End: 2025-07-03 | Stop reason: HOSPADM

## 2025-07-03 RX ORDER — SODIUM CHLORIDE 9 MG/ML
INJECTION, SOLUTION INTRAVENOUS CONTINUOUS
Status: DISCONTINUED | OUTPATIENT
Start: 2025-07-03 | End: 2025-07-03 | Stop reason: HOSPADM

## 2025-07-03 RX ORDER — ACETAMINOPHEN 325 MG/1
650 TABLET ORAL EVERY 4 HOURS PRN
Status: CANCELLED | OUTPATIENT
Start: 2025-07-03

## 2025-07-03 RX ORDER — SODIUM CHLORIDE 9 MG/ML
INJECTION, SOLUTION INTRAVENOUS PRN
Status: CANCELLED | OUTPATIENT
Start: 2025-07-03

## 2025-07-03 RX ADMIN — SODIUM CHLORIDE: 0.9 INJECTION, SOLUTION INTRAVENOUS at 12:12

## 2025-07-03 NOTE — PROGRESS NOTES
CLINICAL PHARMACY NOTE: MEDS TO BEDS    Total # of Prescriptions Filled: 1   The following medications were delivered to the patient:  BRILINTA 90MG     Additional Documentation:

## 2025-07-03 NOTE — PROGRESS NOTES
Patient admitted, consent signed and questions answered. Patient ready for procedure. Call light to reach with side rails up 2 of 2. Right groin clipped with writer and Denisa present.  Family at bedside with patient.  History and physical not done.

## 2025-07-03 NOTE — PROCEDURES
[facility]        Date:   7/3/2025  Patient name: Mir Felix  Date of admission:  7/3/2025 11:32 AM  MRN:   [id number]  YOB: 1945        Operators:  Primary: Maximino Garcia MD.  Assistant:     Indications for cath: USA, multivessel CAD    Procedure performed: PCI to RPDA and second RPL    Access: Right Radial artery      Procedure: After informed consent was obtained with explanation of the risks and benefits, patient was brought to the cath lab. The right wrist was prepped and draped in sterile fashion. 1% lidocaine was used for local block. The Radial artery was cannulated with 6  Fr sheath with brisk arterial blood return. The side port was frequently flushed and aspirated with normal saline.    EBL is 15 mL    Dominance is right    Findings:    RPDA: Ostial and proximal 90% stenosis, length is 34 mm, SHANTHI-3 flow, underwent PCI with ESTEFANI using 2.25 x 38 mm Aníbal stent with 0% residual stenosis and SHANTHI-3 flow    Second RPL: 80% proximal stenosis, length is 22 mm, SHANTHI-3 flow, underwent PCI with ESTEFANI using 2.5 x 26 mm Aníbal stent with 0% residual stenosis and SHANTHI-3 flow      Conclusions:  Successful PCI with ESTEFANI to ostial and proximal RPDA  Successful PCI with ESTEFANI to proximal second RPL    Recommendation:  Routine post PCI orders  Medical treatments.  Risk factors modifications.        Electronically signed by Maximino Garcia MD on 7/3/2025 at 3:22 PM      [facility]  [phone]

## 2025-07-03 NOTE — H&P
[facility]    Consult / H&P               Today's Date: 7/3/2025  Patient Name: Mir Felix  Date of admission: No admission date for patient encounter.  Patient's age: 80 y.o., 1945  Admission Dx: Coronary artery disease with angina pectoris, unspecified vessel or lesion type, unspecified whether native or transplanted heart [I25.119]    Requesting Physician: Maximino Garcia MD    Cardiac Evaluation Reason:  PCI    History Obtained From: patient and chart review     History of Present Illness:    This patient 80 y.o. years old with past medical history of C with 2 essel disease in distal RCA and mid LAD, HTN, HLD presenting for planned PCI after patient was evaluated for CABG and deemed to be too high risk with blockages not amenable to bypass grafting per CTS.    Past Medical History:   has a past medical history of Chronic midline low back pain with right-sided sciatica, GERD (gastroesophageal reflux disease), Hyperlipidemia, Hypertension, Kidney stone, Melanoma (HCC), Nuclear sclerotic cataract of right eye, Panniculitis affecting regions of neck and back, lumbar region, Prostate cancer (HCC), Prostate cancer (HCC), Raised prostate specific antigen, Rheumatoid arthritis (HCC), Right corneal abrasion, Sepsis (HCC), Status post laser cataract surgery, left, Stroke (HCC), and Type 2 diabetes mellitus (HCC).    Past Surgical History:   has a past surgical history that includes Cholecystectomy; Colonoscopy (2018); Cardiac catheterization; Nerve Surgery (Left, 7/26/2019); Nerve Surgery (Left, 8/13/2019); Prostate biopsy (2018); and Cardiac procedure (N/A, 5/8/2025).     Home Medications:    Prior to Admission medications    Medication Sig Start Date End Date Taking? Authorizing Provider   ezetimibe (ZETIA) 10 MG tablet Take 1 tablet by mouth daily 5/30/25   Mariajose Armendariz MD   rosuvastatin (CRESTOR) 10 MG tablet Take 1 tablet by mouth daily 5/30/25   Mariajose Armendariz MD

## 2025-07-03 NOTE — PROGRESS NOTES
Patient received post cath to [facility] 9. Assessment obtained.  Post cath pathway initiated. right radial site with vasc band intact. No hematoma noted. Restrictions reviewed with Patient and family.  Patient without complaints.

## 2025-07-03 NOTE — PROGRESS NOTES
All discharge instructions reviewed, questions answered. Pulses obtained & documented. Pt ambulatory. Site clean dry and intact. No bleeding, hematoma, or bruising noted. Patient discharged with all belongings. IV removed.

## 2025-07-06 ENCOUNTER — RESULTS FOLLOW-UP (OUTPATIENT)
Dept: FAMILY MEDICINE CLINIC | Age: 80
End: 2025-07-06

## 2025-07-17 ENCOUNTER — OFFICE VISIT (OUTPATIENT)
Dept: FAMILY MEDICINE CLINIC | Age: 80
End: 2025-07-17
Payer: MEDICARE

## 2025-07-17 VITALS
TEMPERATURE: 97.9 F | DIASTOLIC BLOOD PRESSURE: 86 MMHG | SYSTOLIC BLOOD PRESSURE: 158 MMHG | OXYGEN SATURATION: 94 % | WEIGHT: 191 LBS | HEIGHT: 68 IN | RESPIRATION RATE: 16 BRPM | BODY MASS INDEX: 28.95 KG/M2 | HEART RATE: 88 BPM

## 2025-07-17 DIAGNOSIS — N48.1 BALANITIS: Primary | ICD-10-CM

## 2025-07-17 PROCEDURE — 99213 OFFICE O/P EST LOW 20 MIN: CPT | Performed by: STUDENT IN AN ORGANIZED HEALTH CARE EDUCATION/TRAINING PROGRAM

## 2025-07-17 RX ORDER — CLOTRIMAZOLE 1 %
CREAM (GRAM) TOPICAL
Qty: 35.4 G | Refills: 1 | Status: SHIPPED | OUTPATIENT
Start: 2025-07-17 | End: 2025-07-24

## 2025-07-17 RX ORDER — BENZOCAINE/MENTHOL 6 MG-10 MG
LOZENGE MUCOUS MEMBRANE
Qty: 30 G | Refills: 1 | Status: SHIPPED | OUTPATIENT
Start: 2025-07-17 | End: 2025-07-24

## 2025-07-17 SDOH — ECONOMIC STABILITY: FOOD INSECURITY: WITHIN THE PAST 12 MONTHS, YOU WORRIED THAT YOUR FOOD WOULD RUN OUT BEFORE YOU GOT MONEY TO BUY MORE.: NEVER TRUE

## 2025-07-17 SDOH — ECONOMIC STABILITY: FOOD INSECURITY: WITHIN THE PAST 12 MONTHS, THE FOOD YOU BOUGHT JUST DIDN'T LAST AND YOU DIDN'T HAVE MONEY TO GET MORE.: NEVER TRUE

## 2025-07-17 ASSESSMENT — PATIENT HEALTH QUESTIONNAIRE - PHQ9
SUM OF ALL RESPONSES TO PHQ QUESTIONS 1-9: 0
2. FEELING DOWN, DEPRESSED OR HOPELESS: NOT AT ALL
SUM OF ALL RESPONSES TO PHQ QUESTIONS 1-9: 0
1. LITTLE INTEREST OR PLEASURE IN DOING THINGS: NOT AT ALL

## 2025-07-17 NOTE — PROGRESS NOTES
[address], [address]  [place] [address]  Phone: [phone]  Fax: [phone]      Date of Visit:  25  Patient Name: Mir Felix   Patient :  1945     ASSESSMENT/PLAN     1. Balanitis  -     clotrimazole (LOTRIMIN AF) 1 % cream; Apply topically 2 times daily., Disp-35.4 g, R-1, Normal  -     hydrocortisone (ALA-CHINMAY) 1 % cream; Apply topically 2 times daily., Disp-30 g, R-1, Normal  -     amoxicillin-clavulanate (AUGMENTIN) 875-125 MG per tablet; Take 1 tablet by mouth 2 times daily for 7 days, Disp-14 tablet, R-0Normal     Symptoms and physical exam consistent [place].  Clotrimazole cream and saline rinses discussed.  Clotrimazole cream twice daily and saline persistently 4 times daily.  If no improvement after 1 week, will add hydrocortisone.  Will send antibiotics with Augmentin as above.  Courage to call his urologist and schedule appointment ASAP in the event this does not improve on its own.  Patient is uncircumcised, unable to fully retract the foreskin at this time given information.  He has no pain or discomfort.  Will discontinue Jardiance and repeat A1c when next due.  Encouraged to cautiously watch diet in the meantime.    Follow up if not improved    - Questions/concerns answered. Patient verbalized and expressed understanding. Medications, laboratory testing, imaging, consultation, and follow up as documented in this encounter.       HPI:     Mir Felix is a 80 y.o. male with   Patient Active Problem List   Diagnosis    Gastroesophageal reflux disease without esophagitis    Hyperlipidemia    Mild persistent asthma without complication    Rheumatoid arthritis (HCC)    Essential hypertension    Malignant tumor of prostate (HCC)    Type 2 diabetes mellitus with complication (HCC)    Chronic right shoulder pain    Exudative age-related macular degeneration of both eyes with active choroidal neovascularization

## 2025-07-17 NOTE — PATIENT INSTRUCTIONS
4 times daily saline rinses  Clotrimazole cream twice daily  If not better in 1 week - also start using hydrocortisone 1% cream (but not before then)  Take antibiotics  STOP JARDIANCE  Schedule an appointment with urology ASAP

## 2025-07-23 DIAGNOSIS — C61 MALIGNANT TUMOR OF PROSTATE (HCC): ICD-10-CM

## 2025-07-23 DIAGNOSIS — K21.9 GASTROESOPHAGEAL REFLUX DISEASE WITHOUT ESOPHAGITIS: ICD-10-CM

## 2025-07-23 DIAGNOSIS — I10 ESSENTIAL HYPERTENSION: ICD-10-CM

## 2025-07-28 RX ORDER — TAMSULOSIN HYDROCHLORIDE 0.4 MG/1
0.4 CAPSULE ORAL DAILY
Qty: 90 CAPSULE | Refills: 1 | Status: SHIPPED | OUTPATIENT
Start: 2025-07-28

## 2025-07-28 RX ORDER — ESOMEPRAZOLE MAGNESIUM 40 MG/1
40 CAPSULE, DELAYED RELEASE ORAL
Qty: 90 CAPSULE | Refills: 1 | Status: SHIPPED | OUTPATIENT
Start: 2025-07-28

## 2025-07-28 RX ORDER — DULOXETIN HYDROCHLORIDE 30 MG/1
30 CAPSULE, DELAYED RELEASE ORAL DAILY
Qty: 90 CAPSULE | Refills: 1 | Status: SHIPPED | OUTPATIENT
Start: 2025-07-28

## 2025-07-28 RX ORDER — LISINOPRIL 20 MG/1
20 TABLET ORAL DAILY
Qty: 90 TABLET | Refills: 1 | Status: SHIPPED | OUTPATIENT
Start: 2025-07-28

## 2025-08-01 ENCOUNTER — TELEPHONE (OUTPATIENT)
Dept: FAMILY MEDICINE CLINIC | Age: 80
End: 2025-08-01

## 2025-08-07 ENCOUNTER — OFFICE VISIT (OUTPATIENT)
Dept: FAMILY MEDICINE CLINIC | Age: 80
End: 2025-08-07
Payer: MEDICARE

## 2025-08-07 VITALS
OXYGEN SATURATION: 95 % | HEART RATE: 67 BPM | BODY MASS INDEX: 29.98 KG/M2 | WEIGHT: 197.2 LBS | SYSTOLIC BLOOD PRESSURE: 146 MMHG | DIASTOLIC BLOOD PRESSURE: 90 MMHG

## 2025-08-07 DIAGNOSIS — Z95.820 STATUS POST ANGIOPLASTY WITH STENT: ICD-10-CM

## 2025-08-07 DIAGNOSIS — N48.1 BALANITIS: ICD-10-CM

## 2025-08-07 DIAGNOSIS — E11.8 TYPE 2 DIABETES MELLITUS WITH COMPLICATION (HCC): ICD-10-CM

## 2025-08-07 DIAGNOSIS — Z01.810 ENCOUNTER FOR PRE-OPERATIVE CARDIOVASCULAR CLEARANCE: Primary | ICD-10-CM

## 2025-08-07 DIAGNOSIS — I25.10 CORONARY ARTERY DISEASE INVOLVING NATIVE CORONARY ARTERY OF NATIVE HEART WITHOUT ANGINA PECTORIS: ICD-10-CM

## 2025-08-07 DIAGNOSIS — N47.1 PHIMOSIS: ICD-10-CM

## 2025-08-07 PROCEDURE — 99212 OFFICE O/P EST SF 10 MIN: CPT | Performed by: STUDENT IN AN ORGANIZED HEALTH CARE EDUCATION/TRAINING PROGRAM

## 2025-08-13 ENCOUNTER — TELEPHONE (OUTPATIENT)
Dept: FAMILY MEDICINE CLINIC | Age: 80
End: 2025-08-13

## 2025-08-13 PROBLEM — I25.10 CORONARY ARTERY DISEASE INVOLVING NATIVE CORONARY ARTERY OF NATIVE HEART WITHOUT ANGINA PECTORIS: Status: ACTIVE | Noted: 2025-08-13

## 2025-08-13 PROBLEM — Z95.820 STATUS POST ANGIOPLASTY WITH STENT: Status: ACTIVE | Noted: 2025-08-13

## (undated) DEVICE — GUIDEWIRE WITH ICE™ HYDROPHILIC COATING: Brand: LUGE™

## (undated) DEVICE — ANGIOGRAPHIC CATHETER: Brand: EXPO™

## (undated) DEVICE — PERCUTANEOUS ENTRY THINWALL NEEDLE  ONE-PART: Brand: COOK

## (undated) DEVICE — GLIDESHEATH SLENDER ACCESS KIT: Brand: GLIDESHEATH SLENDER

## (undated) DEVICE — NEEDLE, QUINCKE, 22GX5": Brand: MEDLINE

## (undated) DEVICE — Z DISCONTINUED USE 2624852 GLOVE SURG 7 PF TEXT NEOPRNE BRN STRL NEOLON 2G LF

## (undated) DEVICE — BANDAGE ADH DIA7/8IN NAT FLEX-FABRIC WVN FOR WND PROTCT

## (undated) DEVICE — GUIDEWIRE 35/260/FC/PTFE/3J: Brand: GUIDEWIRE

## (undated) DEVICE — GLOVE ORANGE PI 8   MSG9080

## (undated) DEVICE — LINE SAMP O2 6.5FT W/FEMALE CONN F/ADULT CAPNOLINE PLUS

## (undated) DEVICE — TRAY SURG CUST CRD CATH TOLEDO

## (undated) DEVICE — GLIDESHEATH SLENDER STAINLESS STEEL KIT: Brand: GLIDESHEATH SLENDER

## (undated) DEVICE — CHLORAPREP 26ML CLEAR

## (undated) DEVICE — CATHETER ANGIO 6FR L100CM DIA0.056IN FR4 CRV VASC ACCS EXPO

## (undated) DEVICE — GLOVE SURG SZ 65 THK91MIL LTX FREE SYN POLYISOPRENE

## (undated) DEVICE — GLOVE ORANGE PI 7   MSG9070

## (undated) DEVICE — BAND COMPR L24CM REG CLR PLAS HEMSTAT EXT HK AND LOOP RETEN

## (undated) DEVICE — GLOVE SURG SZ 8 L12IN THK91MIL BRN LTX FREE POLYCHLOROPRENE

## (undated) DEVICE — TRAY PAIN CUST

## (undated) DEVICE — CATHETER GUIDE 6FR L100CM GRN PTFE JR4 TRUELUMEN W/ 2 SIDE H

## (undated) DEVICE — MICROPUNCTURE INTRODUCER SET SILHOUETTE TRANSITIONLESS WITH STAINLESS STEEL WIRE GUIDE: Brand: MICROPUNCTURE

## (undated) DEVICE — CATH BLLN ANGIO 2X20MM SC EUPHORA RX